# Patient Record
Sex: MALE | Race: WHITE | NOT HISPANIC OR LATINO | Employment: UNEMPLOYED | URBAN - METROPOLITAN AREA
[De-identification: names, ages, dates, MRNs, and addresses within clinical notes are randomized per-mention and may not be internally consistent; named-entity substitution may affect disease eponyms.]

---

## 2018-05-07 ENCOUNTER — OFFICE VISIT (OUTPATIENT)
Dept: FAMILY MEDICINE | Facility: CLINIC | Age: 58
End: 2018-05-07
Payer: COMMERCIAL

## 2018-05-07 VITALS
DIASTOLIC BLOOD PRESSURE: 74 MMHG | TEMPERATURE: 98.9 F | HEIGHT: 71 IN | RESPIRATION RATE: 20 BRPM | HEART RATE: 71 BPM | WEIGHT: 205.9 LBS | OXYGEN SATURATION: 97 % | SYSTOLIC BLOOD PRESSURE: 120 MMHG | BODY MASS INDEX: 28.82 KG/M2

## 2018-05-07 DIAGNOSIS — R29.898 RIGHT ARM WEAKNESS: Primary | ICD-10-CM

## 2018-05-07 DIAGNOSIS — R25.1 TREMOR OF RIGHT HAND: ICD-10-CM

## 2018-05-07 PROCEDURE — 99214 OFFICE O/P EST MOD 30 MIN: CPT | Performed by: PHYSICIAN ASSISTANT

## 2018-05-07 ASSESSMENT — PAIN SCALES - GENERAL: PAINLEVEL: NO PAIN (0)

## 2018-05-07 NOTE — PROGRESS NOTES
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     Right arm weakness, and swelling      Duration: 6 months, gradual loss of function and weakness. Patient reports that fine movement is altered, f.e hard to hold a pen and write. Hand is swollen    Description (location/character/radiation): right arm    Intensity:  moderate    Accompanying signs and symptoms: weak  and fine movement is affected    History (similar episodes/previous evaluation): patient reports that 10 years ago patient has neck MRI that showed spinal stenosis. No surgery was done since it was mild. No current neck pain.     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     Precipitating or alleviating factors: None    Therapies tried and outcome: None       Problem list and histories reviewed & adjusted, as indicated.  Additional history: as documented    There is no problem list on file for this patient.    History reviewed. No pertinent surgical history.    Social History   Substance Use Topics     Smoking status: Light Tobacco Smoker     Types: Cigars     Smokeless tobacco: Never Used     Alcohol use Yes     Family History   Problem Relation Age of Onset     Hypertension Father          No current outpatient prescriptions on file.     No Known Allergies           ROS:  Constitutional, HEENT, cardiovascular, pulmonary, GI, , musculoskeletal, neuro, skin, endocrine and psych systems are negative, except as otherwise noted.    OBJECTIVE:     /74 (BP Location: Right arm, Patient Position: Sitting, Cuff Size: Adult Large)  Pulse 71  Temp 98.9  F (37.2  C) (Oral)  Resp 20  Ht 1.815 m (5' 11.46\")  Wt 93.4 kg (205 lb 14.4 oz)  SpO2 97%  BMI 28.35 " kg/m2  Body mass index is 28.35 kg/(m^2).  GENERAL: healthy, alert and no distress  NECK: no adenopathy, no asymmetry, masses, or scars and thyroid normal to palpation  RESP: lungs clear to auscultation - no rales, rhonchi or wheezes  CV: regular rate and rhythm, normal S1 S2, no S3 or S4, no murmur, click or rub, no peripheral edema and peripheral pulses strong  ABDOMEN: soft, nontender, no hepatosplenomegaly, no masses and bowel sounds normal  MS: no gross musculoskeletal defects noted, no edema  NEURO: weakness of right arm C6 and C7,  is 4/5 compared to left , tremor right hand only, sensory exam grossly normal, mentation intact, oriented times 3, speech slurred and DTR's normal and symmetric +2 bilaterally, cranial nerved- right facial nerve weakness (asymmetrical smile)    Diagnostic Test Results:  none     ASSESSMENT/PLAN:       ICD-10-CM    1. Right arm weakness R29.898 NEUROLOGY ADULT REFERRAL     MR Cervical Spine w/o Contrast   2. Tremor of right hand R25.1 NEUROLOGY ADULT REFERRAL     Patient will make appointment with neurologist for a full evaluation.   MRI of cervical spine was ordered as well.       Earline Rivas PA-C  [facility]

## 2018-05-07 NOTE — PATIENT INSTRUCTIONS
Please call  Radiology at [phone] to schedule your appointment for MRI of neck     Make appointment with neurology

## 2018-05-07 NOTE — MR AVS SNAPSHOT
After Visit Summary   5/7/2018    Marcus Daley    MRN: [id number]           Patient Information     Date Of Birth          1960        Visit Information        Provider Department      5/7/2018 10:00 AM Earline Rivas PA-C [facility]        Today's Diagnoses     Right arm weakness    -  1      Care Instructions    Please call  Radiology at [phone] to schedule your appointment for MRI of neck     Make appointment with neurology            Follow-ups after your visit        Additional Services     NEUROLOGY ADULT REFERRAL       Your provider has referred you for the following:   Consult at [facility]: [facility] [phone]   [email or web address]    Please be aware that coverage of these services is subject to the terms and limitations of your health insurance plan.  Call member services at your health plan with any benefit or coverage questions.      Please bring the following with you to your appointment:    (1) Any X-Rays, CTs or MRIs which have been performed.  Contact the facility where they were done to arrange for  prior to your scheduled appointment.    (2) List of current medications  (3) This referral request   (4) Any documents/labs given to you for this referral                  Future tests that were ordered for you today     Open Future Orders        Priority Expected Expires Ordered    MR Cervical Spine w/o Contrast Routine  5/7/2019 5/7/2018            Who to contact     If you have questions or need follow up information about today's clinic visit or your schedule please contact [facility] directly at [phone].  Normal or non-critical lab and imaging results will be communicated to you by MyChart, letter or phone within 4 business days after the clinic has received the results. If you do not hear from us within 7 days, please  "contact the clinic through [employer] or phone. If you have a critical or abnormal lab result, we will notify you by phone as soon as possible.  Submit refill requests through [employer] or call your pharmacy and they will forward the refill request to us. Please allow 3 business days for your refill to be completed.          Additional Information About Your Visit        [employer] Information     [employer] gives you secure access to your electronic health record. If you see a primary care provider, you can also send messages to your care team and make appointments. If you have questions, please call your primary care clinic.  If you do not have a primary care provider, please call [phone] and they will assist you.        Care EveryWhere ID     This is your Care EveryWhere ID. This could be used by other organizations to access your [place] medical records  [id number]        Your Vitals Were     Pulse Temperature Respirations Height Pulse Oximetry BMI (Body Mass Index)    71 98.9  F (37.2  C) (Oral) 20 1.815 m (5' 11.46\") 97% 28.35 kg/m2       Blood Pressure from Last 3 Encounters:   05/07/18 120/74   11/19/14 120/74    Weight from Last 3 Encounters:   05/07/18 93.4 kg (205 lb 14.4 oz)   11/19/14 91.2 kg (201 lb)              We Performed the Following     NEUROLOGY ADULT REFERRAL        Primary Care Provider Office Phone # Fax #    Sharona Rossy Nicholas PA-C [phone] [address] [place] [facility] [address]        Equal Access to Services     BANDAR MCDUFFIE AH: Hadii aad ku hadasho Soomaali, waaxda luqadaha, qaybta kaalmada adeegyada, waxay ahmet chavarria. So [facility] [phone].    ATENCIÓN: Si habla español, tiene a hadley disposición servicios gratuitos de asistencia lingüística. Llame al [phone].    We comply with applicable federal civil rights laws and [place] laws. We do not discriminate on the basis of race, color, national origin, age, disability, sex, sexual orientation, " or gender identity.            Thank you!     Thank you for choosing [facility]  for your care. Our goal is always to provide you with excellent care. Hearing back from our patients is one way we can continue to improve our services. Please take a few minutes to complete the written survey that you may receive in the mail after your visit with us. Thank you!             Your Updated Medication List - Protect others around you: Learn how to safely use, store and throw away your medicines at www.disposemymeds.org.      Notice  As of 5/7/2018 10:53 AM    You have not been prescribed any medications.

## 2018-05-10 ENCOUNTER — OFFICE VISIT (OUTPATIENT)
Dept: NEUROLOGY | Facility: CLINIC | Age: 58
End: 2018-05-10
Payer: COMMERCIAL

## 2018-05-10 VITALS
HEART RATE: 87 BPM | SYSTOLIC BLOOD PRESSURE: 143 MMHG | BODY MASS INDEX: 28.56 KG/M2 | DIASTOLIC BLOOD PRESSURE: 87 MMHG | TEMPERATURE: 98.7 F | HEIGHT: 71 IN | WEIGHT: 204 LBS

## 2018-05-10 DIAGNOSIS — G25.9 EXTRAPYRAMIDAL DISORDER: ICD-10-CM

## 2018-05-10 DIAGNOSIS — R29.898 RIGHT HAND WEAKNESS: Primary | ICD-10-CM

## 2018-05-10 ASSESSMENT — PAIN SCALES - GENERAL: PAINLEVEL: NO PAIN (0)

## 2018-05-10 NOTE — LETTER
5/10/2018       RE: Marcus Daley  [address] [place] [address]     Dear Colleague,    Thank you for referring your patient, Marcus Daley, to the [facility] NEUROSPECIALTIES at [facility]. Please see a copy of my visit note below.    Service Date: 05/10/2018      REASON FOR VISIT:   Marcus Daley is a 57-year-old right-handed male referred by Earline Rivas for evaluation of right hand weakness and tremor.      HISTORY OF PRESENT ILLNESS:  He appreciated the problem starting about 6-8 months ago.  One of the first things he noted that he was having more difficulty strumming his guitar with his right hand.  As time has gone on, he has noted a sense of weakness and stiffness in the right upper extremity as well as some clumsiness with right hand activities.  He has noted a tremor of the right hand but not clearly a rest tremor.  He does not feel he has any difficulty with his gait that been apparent.  He has had some pain in his neck which is likely chronic, but no radicular pain in the right arm.  There is equivocal change in sensation in his right hand.  He has been on no medications.  He denies a change in his sense of smell.  He denies a history of REM sleep behavioral disturbance.      He does have a history of cervical spondylosis and had a cervical MRI scan done in 2010 that revealed some foraminal narrowing and disk protrusions towards the left at C3-C4 and C6-C7, but no spinal cord abnormalities.      Interestingly, back around 2010 he developed what was characterized as a spastic dysphonia but this resolved on its own.      PAST MEDICAL HISTORY:   Otherwise unremarkable.        CURRENT MEDICATIONS:   He is on no medications.        ALLERGIES:   He denies any medical allergies.      FAMILY HISTORY:  Negative for neurologic disorders as best he knows.  He does tell me his father as he got older started delegating more activities to his nondominant left  upper extremity for reasons that are not clear.      SOCIAL HISTORY:   He is a part-time .  He smokes cigars on occasion.  He averages about 6 beers a week.      PHYSICAL EXAMINATION:     GENERAL:  The patient is alert and cooperative.     VITAL SIGNS:   Heart rate 87.  Blood pressure 143/87.   NEUROLOGIC:   The patient does have a reduced blink rate.  He tends to hold the right hand in a posture and with the arm also flexed at the elbow.  He has a very fine postural tremor but no rest tremor.  He has increased tone in the right upper extremity with associated cogwheeling and also some mild cogwheeling in the left arm.  Finger tapping movements are very low amplitude on the right but normal on the left.  His gait is characterized by a lack of arm swing on the right. He maintains balance when pulled.     Cranial nerves II-XII are intact.  Motor examination reveals intact strength.  Sensory examination is intact.  Cerebellar function appears intact.  Reflexes are 2 in the upper extremities, 3+ at the knees and 1+ at the ankles.  Plantar responses are flexor.      IMPRESSION:  Probable Parkinson disease.      He does appear to have an extrapyramidal disorder predominantly affecting the right upper extremity.  I suspect this is Idiopathic Parkinson disease.      PLAN:  He has been scheduled to have  cervical MRI scan and I recommended also he have a brain MRI scan to make certain we are not dealing with a structural lesion in the left hemisphere that might account for his findings and symptoms.      I did discuss Parkinson disease with him.  I told him that I am concerned that is the diagnosis.  We touched upon a trial of carbidopa/levodopa and he will consider that.      I am going to be seeing him back in 2 weeks to review testing.      Mehul Barraza MD      cc:   Earline Rivas PA-C   [place] [address]              D:  05/10/2018   T: 05/10/2018   MT: MARIFER      Name:     KIA VILLAGRAN   MRN:      [id number]        Account:      [id number]   :      1960           Service Date: 05/10/2018      Document: [id number]

## 2018-05-10 NOTE — PROGRESS NOTES
Service Date: 05/10/2018      REASON FOR VISIT:   Marcus Daley is a 57-year-old right-handed male referred by Earline Rivas for evaluation of right hand weakness and tremor.      HISTORY OF PRESENT ILLNESS:  He appreciated the problem starting about 6-8 months ago.  One of the first things he noted that he was having more difficulty strumming his guitar with his right hand.  As time has gone on, he has noted a sense of weakness and stiffness in the right upper extremity as well as some clumsiness with right hand activities.  He has noted a tremor of the right hand but not clearly a rest tremor.  He does not feel he has any difficulty with his gait that been apparent.  He has had some pain in his neck which is likely chronic, but no radicular pain in the right arm.  There is equivocal change in sensation in his right hand.  He has been on no medications.  He denies a change in his sense of smell.  He denies a history of REM sleep behavioral disturbance.      He does have a history of cervical spondylosis and had a cervical MRI scan done in 2010 that revealed some foraminal narrowing and disk protrusions towards the left at C3-C4 and C6-C7, but no spinal cord abnormalities.      Interestingly, back around 2010 he developed what was characterized as a spastic dysphonia but this resolved on its own.      PAST MEDICAL HISTORY:   Otherwise unremarkable.        CURRENT MEDICATIONS:   He is on no medications.        ALLERGIES:   He denies any medical allergies.      FAMILY HISTORY:  Negative for neurologic disorders as best he knows.  He does tell me his father as he got older started delegating more activities to his nondominant left upper extremity for reasons that are not clear.      SOCIAL HISTORY:   He is a part-time .  He smokes cigars on occasion.  He averages about 6 beers a week.      PHYSICAL EXAMINATION:     GENERAL:  The patient is alert and cooperative.     VITAL SIGNS:   Heart rate  87.  Blood pressure 143/87.   NEUROLOGIC:   The patient does have a reduced blink rate.  He tends to hold the right hand in a posture and with the arm also flexed at the elbow.  He has a very fine postural tremor but no rest tremor.  He has increased tone in the right upper extremity with associated cogwheeling and also some mild cogwheeling in the left arm.  Finger tapping movements are very low amplitude on the right but normal on the left.  His gait is characterized by a lack of arm swing on the right. He maintains balance when pulled.     Cranial nerves II-XII are intact.  Motor examination reveals intact strength.  Sensory examination is intact.  Cerebellar function appears intact.  Reflexes are 2 in the upper extremities, 3+ at the knees and 1+ at the ankles.  Plantar responses are flexor.      IMPRESSION:  Probable Parkinson disease.      He does appear to have an extrapyramidal disorder predominantly affecting the right upper extremity.  I suspect this is Idiopathic Parkinson disease.      PLAN:  He has been scheduled to have  cervical MRI scan and I recommended also he have a brain MRI scan to make certain we are not dealing with a structural lesion in the left hemisphere that might account for his findings and symptoms.      I did discuss Parkinson disease with him.  I told him that I am concerned that is the diagnosis.  We touched upon a trial of carbidopa/levodopa and he will consider that.      I am going to be seeing him back in 2 weeks to review testing.      Mehul Barraza MD      cc:   Earline Rivas PA-C   [place] [address]         MEHUL BARRAZA MD             D: 05/10/2018   T: 05/10/2018   MT: MARIFER      Name:     KIA VILLAGRAN   MRN:      [id number]        Account:      [id number]   :      1960           Service Date: 05/10/2018      Document: [id number]

## 2018-05-10 NOTE — MR AVS SNAPSHOT
After Visit Summary   5/10/2018    Marcus Daley    MRN: [id number]           Patient Information     Date Of Birth          1960        Visit Information        Provider Department      5/10/2018 9:15 AM Mehul Barraza MD [facility] NEUROSPECIALTIES        Today's Diagnoses     Right hand weakness    -  1    Extrapyramidal disorder           Follow-ups after your visit        Follow-up notes from your care team     Inform patient at next visit Return in about 2 weeks (around 5/24/2018).      Your next 10 appointments already scheduled     May 24, 2018  9:45 AM CDT   Return Visit with Mehul Barraza MD   [facility] NEUROSPECIALTIES ([facility])    [address] [address] [phone]              Future tests that were ordered for you today     Open Future Orders        Priority Expected Expires Ordered    MR Brain w/o & w Contrast Routine 5/10/2018 5/10/2019 5/10/2018            Who to contact     Please call your clinic at [phone] to:    Ask questions about your health    Make or cancel appointments    Discuss your medicines    Learn about your test results    Speak to your doctor            Additional Information About Your Visit        [employer] Information     [employer] gives you secure access to your electronic health record. If you see a primary care provider, you can also send messages to your care team and make appointments. If you have questions, please call your primary care clinic.  If you do not have a primary care provider, please call [phone] and they will assist you.      [employer] is an electronic gateway that provides easy, online access to your medical records. With [employer], you can request a clinic appointment, read your test results, renew a prescription or communicate with your care team.     To access your existing account, please contact your [facility] or call [phone] for assistance.       "  Care EveryWhere ID     This is your Care EveryWhere ID. This could be used by other organizations to access your [place] medical records  [id number]        Your Vitals Were     Pulse Temperature Height BMI (Body Mass Index)          87 98.7  F (37.1  C) (Temporal) 5' 11.46\" (181.5 cm) 28.09 kg/m2         Blood Pressure from Last 3 Encounters:   05/10/18 143/87   05/07/18 120/74   11/19/14 120/74    Weight from Last 3 Encounters:   05/10/18 204 lb (92.5 kg)   05/07/18 205 lb 14.4 oz (93.4 kg)   11/19/14 201 lb (91.2 kg)               Primary Care Provider Office Phone # Fax #    Sharona Rossy Nicholas PA-C [phone] [address] [place] [address]        Equal Access to Services     [facility]: Hadii aad ku hadasho Sokailashali, waaxda luqadaha, qaybta kaalmada adeegyada, waxay idiin hayaan deysi ocampo . So [facility] [phone].    ATENCIÓN: Si habla español, tiene a hadley disposición servicios gratuitos de asistencia lingüística. Llame al [phone].    We comply with applicable federal civil rights laws and [place] laws. We do not discriminate on the basis of race, color, national origin, age, disability, sex, sexual orientation, or gender identity.            Thank you!     Thank you for choosing [facility] NEUROSPECIALTIES  for your care. Our goal is always to provide you with excellent care. Hearing back from our patients is one way we can continue to improve our services. Please take a few minutes to complete the written survey that you may receive in the mail after your visit with us. Thank you!             Your Updated Medication List - Protect others around you: Learn how to safely use, store and throw away your medicines at www.disposemymeds.org.      Notice  As of 5/10/2018 10:03 AM    You have not been prescribed any medications.      "

## 2018-05-17 ENCOUNTER — RADIANT APPOINTMENT (OUTPATIENT)
Dept: MRI IMAGING | Facility: CLINIC | Age: 58
End: 2018-05-17
Attending: PSYCHIATRY & NEUROLOGY
Payer: COMMERCIAL

## 2018-05-17 ENCOUNTER — RADIANT APPOINTMENT (OUTPATIENT)
Dept: MRI IMAGING | Facility: CLINIC | Age: 58
End: 2018-05-17
Attending: PHYSICIAN ASSISTANT
Payer: COMMERCIAL

## 2018-05-17 DIAGNOSIS — R29.898 RIGHT HAND WEAKNESS: ICD-10-CM

## 2018-05-17 DIAGNOSIS — R29.898 RIGHT ARM WEAKNESS: Primary | ICD-10-CM

## 2018-05-17 DIAGNOSIS — G25.9 EXTRAPYRAMIDAL DISORDER: ICD-10-CM

## 2018-05-17 DIAGNOSIS — M48.02 FORAMINAL STENOSIS OF CERVICAL REGION: ICD-10-CM

## 2018-05-17 PROCEDURE — 70553 MRI BRAIN STEM W/O & W/DYE: CPT | Performed by: RADIOLOGY

## 2018-05-17 PROCEDURE — A9585 GADOBUTROL INJECTION: HCPCS | Mod: JW | Performed by: PSYCHIATRY & NEUROLOGY

## 2018-05-17 PROCEDURE — 72141 MRI NECK SPINE W/O DYE: CPT | Performed by: RADIOLOGY

## 2018-05-17 RX ORDER — GADOBUTROL 604.72 MG/ML
10 INJECTION INTRAVENOUS ONCE
Status: COMPLETED | OUTPATIENT
Start: 2018-05-17 | End: 2018-05-17

## 2018-05-17 RX ADMIN — GADOBUTROL 9.5 ML: 604.72 INJECTION INTRAVENOUS at 12:32

## 2018-05-18 ENCOUNTER — TELEPHONE (OUTPATIENT)
Dept: OTHER | Facility: CLINIC | Age: 58
End: 2018-05-18

## 2018-05-24 ENCOUNTER — OFFICE VISIT (OUTPATIENT)
Dept: NEUROLOGY | Facility: CLINIC | Age: 58
End: 2018-05-24
Payer: COMMERCIAL

## 2018-05-24 VITALS
BODY MASS INDEX: 28.53 KG/M2 | WEIGHT: 203.8 LBS | TEMPERATURE: 98.7 F | DIASTOLIC BLOOD PRESSURE: 91 MMHG | HEART RATE: 103 BPM | HEIGHT: 71 IN | SYSTOLIC BLOOD PRESSURE: 155 MMHG

## 2018-05-24 DIAGNOSIS — G20.A1 PARALYSIS AGITANS (H): Primary | ICD-10-CM

## 2018-05-24 RX ORDER — CARBIDOPA AND LEVODOPA 25; 100 MG/1; MG/1
TABLET ORAL
Qty: 90 TABLET | Refills: 3 | Status: SHIPPED | OUTPATIENT
Start: 2018-05-24 | End: 2018-09-07

## 2018-05-24 RX ORDER — METOPROLOL SUCCINATE 100 MG/1
TABLET, EXTENDED RELEASE ORAL
COMMUNITY
Start: 2018-01-24 | End: 2019-10-11

## 2018-05-24 ASSESSMENT — PAIN SCALES - GENERAL: PAINLEVEL: NO PAIN (0)

## 2018-05-24 NOTE — LETTER
5/24/2018       RE: Marcus Daley  [address] [place] [address]     Dear Colleague,    Thank you for referring your patient, Marcus Daley, to the [facility] NEUROSPECIALTIES at [facility]. Please see a copy of my visit note below.    Service Date: 05/24/2018      INTERVAL HISTORY:   Marcus Daley returns for followup.  Today he is accompanied by his wife.  He is a patient I saw earlier this month for evaluation of right upper extremity rigidity and tremor.  I felt he likely had Parkinson disease.      He did have prior to seeing me a cervical MRI scan ordered and I did review the images with the patient and his wife.  His spinal cord looks normal.  He does have some degenerative changes.  There is some moderate foraminal narrowing at C4-C5 on the right and also some foraminal narrowing on the left at C6-C7 and C3-C4.  I do not think these findings are sufficient to account for his findings, although it might be relevant to some of the paresthesias he is experiencing.      A brain MRI scan was done and it was reviewed as well.  The study is normal.      I discussed Parkinson disease.  I do believe that is the diagnosis and likely is idiopathic Parkinson disease.  His wife has questions about doing a DaTscan.  I did tell them that in my opinion, I did not think a DaTscan was necessary.  I did talk about getting a second opinion and his wife encouraged him to pursue this.      We did discuss symptomatic therapy and in particular carbidopa/levodopa.  I explained how the drug works.  We reviewed potential side effects.      He would like to try the carbidopa/levodopa.  He is going to start on half of a 25/100 tablet twice a day for a week, then he can go to a half a tablet 3 times a day for a week and then a full tablet 3 times a day.      I did place a referral for him to see Dr. Rodriguez at the [facility] for a second opinion.      I am not certain when he  will get in to see Dr. Rodriguez so I also scheduled a followup appointment with me in 1 month.  He can cancel that if he gets in to see Dr. Rodriguez sooner.      He is going to hold on starting the carbidopa/levodopa until after .  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        Mehul Barraza MD      cc:   Abelardo Rodriguez MD   [facility]    [address] [place] [address]           D: 2018   T: 2018   MT: MARIFER      Name:     KIA VILLAGRAN   MRN:      [id number]        Account:      [id number]   :      1960           Service Date: 2018      Document: [id number]

## 2018-05-24 NOTE — MR AVS SNAPSHOT
After Visit Summary   5/24/2018    Marcus Daley    MRN: [id number]           Patient Information     Date Of Birth          1960        Visit Information        Provider Department      5/24/2018 9:45 AM Mehul Barraza MD [facility] NEUROSPECIALTIES        Today's Diagnoses     Paralysis agitans (H)    -  1       Follow-ups after your visit        Additional Services     NEUROLOGY ADULT REFERRAL       Your provider has referred you for the following:   Consult at [facility]: [facility] [phone]   [email or web address]/  Dr Rodriguez    Please be aware that coverage of these services is subject to the terms and limitations of your health insurance plan.  Call member services at your health plan with any benefit or coverage questions.      Please bring the following with you to your appointment:    (1) Any X-Rays, CTs or MRIs which have been performed.  Contact the facility where they were done to arrange for  prior to your scheduled appointment.    (2) List of current medications  (3) This referral request   (4) Any documents/labs given to you for this referral                  Follow-up notes from your care team     Discussed this visit Return in about 1 month (around 6/24/2018).      Your next 10 appointments already scheduled     Jun 28, 2018  9:45 AM CDT   Return Visit with Mehul Barraza MD   [facility] NEUROSPECIALTIES ([facility])    [address] [id number] [phone]              Who to contact     Please call your clinic at [phone] to:    Ask questions about your health    Make or cancel appointments    Discuss your medicines    Learn about your test results    Speak to your doctor            Additional Information About Your Visit        MyChart Information     [employer] / [employer] gives you secure access to your electronic health record. If you see a primary care  "provider, you can also send messages to your care team and make appointments. If you have questions, please call your primary care clinic.  If you do not have a primary care provider, please call [phone] and they will assist you.      [employer] is an electronic gateway that provides easy, online access to your medical records. With [employer], you can request a clinic appointment, read your test results, renew a prescription or communicate with your care team.     To access your existing account, please contact your [facility] or call [phone] for assistance.        Care EveryWhere ID     This is your Care EveryWhere ID. This could be used by other organizations to access your [place] medical records  [id number]        Your Vitals Were     Pulse Temperature Height BMI (Body Mass Index)          103 98.7  F (37.1  C) (Temporal) 5' 11.46\" (181.5 cm) 28.06 kg/m2         Blood Pressure from Last 3 Encounters:   05/24/18 (!) 155/91   05/10/18 143/87   05/07/18 120/74    Weight from Last 3 Encounters:   05/24/18 203 lb 12.8 oz (92.4 kg)   05/10/18 204 lb (92.5 kg)   05/07/18 205 lb 14.4 oz (93.4 kg)              We Performed the Following     NEUROLOGY ADULT REFERRAL          Today's Medication Changes          These changes are accurate as of 5/24/18 10:34 AM.  If you have any questions, ask your nurse or doctor.               Start taking these medicines.        Dose/Directions    carbidopa-levodopa  MG per tablet   Commonly known as:  SINEMET   Used for:  Paralysis agitans (H)   Started by:  Mehul Barraza MD        1/2 tablet twice a day x 1 week, then 1/2 tablet 3 times a day x 1 week, then 1 tablet 3 times a day   Quantity:  90 tablet   Refills:  3            Where to get your medicines      These medications were sent to [facility]  JAMARI DAVIS - [address] [place] AT [facility] [address] [place] [address]     Phone:  " [phone]     carbidopa-levodopa  MG per tablet                Primary Care Provider Office Phone # Fax #    Sharona Nicholas PA-C [phone] [address] [place] [address]        Equal Access to Services     : Hadii aad ku hadasho Soomaali, waaxda luqadaha, qaybta kaalmada adeegyada, waxay idiin hayaan adegarrett eugeniamohamud lanancy chavarria. So [facility] [phone].    ATENCIÓN: Si habla español, tiene a hadley disposición servicios gratuitos de asistencia lingüística. Tayoame al [phone].    We comply with applicable federal civil rights laws and [place] laws. We do not discriminate on the basis of race, color, national origin, age, disability, sex, sexual orientation, or gender identity.            Thank you!     Thank you for choosing [facility] NEUROSPECIALTIES  for your care. Our goal is always to provide you with excellent care. Hearing back from our patients is one way we can continue to improve our services. Please take a few minutes to complete the written survey that you may receive in the mail after your visit with us. Thank you!             Your Updated Medication List - Protect others around you: Learn how to safely use, store and throw away your medicines at www.disposemymeds.org.          This list is accurate as of 5/24/18 10:34 AM.  Always use your most recent med list.                   Brand Name Dispense Instructions for use Diagnosis    carbidopa-levodopa  MG per tablet    SINEMET    90 tablet    1/2 tablet twice a day x 1 week, then 1/2 tablet 3 times a day x 1 week, then 1 tablet 3 times a day    Paralysis agitans (H)       metoprolol succinate 100 MG 24 hr tablet    TOPROL-XL     Pt takes 50 mg once a week

## 2018-05-24 NOTE — LETTER
5/24/2018       RE: Marcus Daley  [address] [place] [address]     Dear Colleague,    Thank you for referring your patient, Marcus Daley, to the [facility] NEUROSPECIALTIES at [facility]. Please see a copy of my visit note below.    Service Date: 05/24/2018      INTERVAL HISTORY:   Marcus Daley returns for followup.  Today he is accompanied by his wife.  He is a patient I saw earlier this month for evaluation of right upper extremity rigidity and tremor.  I felt he likely had Parkinson disease.      He did have prior to seeing me a cervical MRI scan ordered and I did review the images with the patient and his wife.  His spinal cord looks normal.  He does have some degenerative changes.  There is some moderate foraminal narrowing at C4-C5 on the right and also some foraminal narrowing on the left at C6-C7 and C3-C4.  I do not think these findings are sufficient to account for his findings, although it might be relevant to some of the paresthesias he is experiencing.      A brain MRI scan was done and it was reviewed as well.  The study is normal.      I discussed Parkinson disease.  I do believe that is the diagnosis and likely is idiopathic Parkinson disease.  His wife has questions about doing a DaTscan.  I did tell them that in my opinion, I did not think a DaTscan was necessary.  I did talk about getting a second opinion and his wife encouraged him to pursue this.      We did discuss symptomatic therapy and in particular carbidopa/levodopa.  I explained how the drug works.  We reviewed potential side effects.      He would like to try the carbidopa/levodopa.  He is going to start on half of a 25/100 tablet twice a day for a week, then he can go to a half a tablet 3 times a day for a week and then a full tablet 3 times a day.      I did place a referral for him to see Dr. Rodriguez at the [facility] for a second opinion.      I am not certain when he  will get in to see Dr. Rodriguez so I also scheduled a followup appointment with me in 1 month.  He can cancel that if he gets in to see Dr. Rodriguez sooner.      He is going to hold on starting the carbidopa/levodopa until after .  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        Mehul Barraza MD      cc:   Abelardo Rodriguez MD   [facility]    [address] [place] [address]          D: 2018   T: 2018   MT: MARIFER      Name:     KIA VILLAGRAN   MRN:      [id number]        Account:      [id number]   :      1960           Service Date: 2018      Document: [id number]

## 2018-05-24 NOTE — PROGRESS NOTES
Service Date: 05/24/2018      INTERVAL HISTORY:   Marcus Daley returns for followup.  Today he is accompanied by his wife.  He is a patient I saw earlier this month for evaluation of right upper extremity rigidity and tremor.  I felt he likely had Parkinson disease.      He did have prior to seeing me a cervical MRI scan ordered and I did review the images with the patient and his wife.  His spinal cord looks normal.  He does have some degenerative changes.  There is some moderate foraminal narrowing at C4-C5 on the right and also some foraminal narrowing on the left at C6-C7 and C3-C4.  I do not think these findings are sufficient to account for his findings, although it might be relevant to some of the paresthesias he is experiencing.      A brain MRI scan was done and it was reviewed as well.  The study is normal.      I discussed Parkinson disease.  I do believe that is the diagnosis and likely is idiopathic Parkinson disease.  His wife has questions about doing a DaTscan.  I did tell them that in my opinion, I did not think a DaTscan was necessary.  I did talk about getting a second opinion and his wife encouraged him to pursue this.      We did discuss symptomatic therapy and in particular carbidopa/levodopa.  I explained how the drug works.  We reviewed potential side effects.      He would like to try the carbidopa/levodopa.  He is going to start on half of a 25/100 tablet twice a day for a week, then he can go to a half a tablet 3 times a day for a week and then a full tablet 3 times a day.      I did place a referral for him to see Dr. Rodriguez at the [facility] for a second opinion.      I am not certain when he will get in to see Dr. Rodriguez so I also scheduled a followup appointment with me in 1 month.  He can cancel that if he gets in to see Dr. Rodriguez sooner.      He is going to hold on starting the carbidopa/levodopa until after 06/07.  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        Mehul Barraza MD      cc:   Abelardo Rodriguez MD   [facility]    [address] [place] [address]         MEHUL BARRAZA MD             D: 2018   T: 2018   MT: MARIFER      Name:     KIA VILLAGRAN   MRN:      [id number]        Account:      [id number]   :      1960           Service Date: 2018      Document: [id number]

## 2018-05-24 NOTE — LETTER
5/24/2018       RE: Marcus Daley  [address] [place] [address]     Dear Colleague,    Thank you for referring your patient, Marcus Daley, to the [facility] NEUROSPECIALTIES at [facility]. Please see a copy of my visit note below.    Service Date: 05/24/2018      INTERVAL HISTORY:   Marcus Daley returns for followup.  Today he is accompanied by his wife.  He is a patient I saw earlier this month for evaluation of right upper extremity rigidity and tremor.  I felt he likely had Parkinson disease.      He did have prior to seeing me a cervical MRI scan ordered and I did review the images with the patient and his wife.  His spinal cord looks normal.  He does have some degenerative changes.  There is some moderate foraminal narrowing at C4-C5 on the right and also some foraminal narrowing on the left at C6-C7 and C3-C4.  I do not think these findings are sufficient to account for his findings, although it might be relevant to some of the paresthesias he is experiencing.      A brain MRI scan was done and it was reviewed as well.  The study is normal.      I discussed Parkinson disease.  I do believe that is the diagnosis and likely is idiopathic Parkinson disease.  His wife has questions about doing a DaTscan.  I did tell them that in my opinion, I did not think a DaTscan was necessary.  I did talk about getting a second opinion and his wife encouraged him to pursue this.      We did discuss symptomatic therapy and in particular carbidopa/levodopa.  I explained how the drug works.  We reviewed potential side effects.      He would like to try the carbidopa/levodopa.  He is going to start on half of a 25/100 tablet twice a day for a week, then he can go to a half a tablet 3 times a day for a week and then a full tablet 3 times a day.      I did place a referral for him to see Dr. Rodriguez at the [facility] for a second opinion.      I am not certain when he  will get in to see Dr. Rodriguez so I also scheduled a followup appointment with me in 1 month.  He can cancel that if he gets in to see Dr. Rodriguez sooner.      He is going to hold on starting the carbidopa/levodopa until after .  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        Mehul Barraza MD      cc:   Abelardo Rodriguez MD   [facility]    [address] [place] [address]              D: 2018   T: 2018   MT: MARIFER      Name:     KIA VILLAGRAN   MRN:      [id number]        Account:      [id number]   :      1960           Service Date: 2018      Document: [id number]

## 2018-06-11 ENCOUNTER — TELEPHONE (OUTPATIENT)
Dept: NEUROLOGY | Facility: CLINIC | Age: 58
End: 2018-06-11

## 2018-06-11 NOTE — TELEPHONE ENCOUNTER
[facility] Call Center    Phone Message    May a detailed message be left on voicemail: yes    Reason for Call: Other: Pt has a few questions before he starts carbidopa-levodopa (SINEMET)  MG per tablet. Please give him a call back.     Action Taken: Message routed to:  [facility] ([facility] Neurology

## 2018-06-11 NOTE — TELEPHONE ENCOUNTER
Returned a call to Marcus.  Had to leave a vm asking him to call me back to discuss his questions.

## 2018-06-27 ENCOUNTER — PRE VISIT (OUTPATIENT)
Dept: NEUROLOGY | Facility: CLINIC | Age: 58
End: 2018-06-27

## 2018-06-27 NOTE — TELEPHONE ENCOUNTER
PREVISIT INFORMATION                                                    Marcus BAZZI Rinayudelka scheduled for future visit at [facility] specialty clinics.    Patient is scheduled to see Dr. Colby on 6/29  Reason for visit: probably PD dx, hasn't started medications yet because he wants to talk to Dr. Rodriguez 1st  Referring provider Hyser  Has patient seen previous specialist? Madi  Medical Records:  Available in chart.  Patient was previously seen at a [facility] or [facility] facility. MRI in [facility]     REVIEW                                                      New patient packet mailed to patient: Yes  Medication reconciliation complete: Yes, recent office visit      Current Outpatient Prescriptions   Medication Sig Dispense Refill     carbidopa-levodopa (SINEMET)  MG per tablet 1/2 tablet twice a day x 1 week, then 1/2 tablet 3 times a day x 1 week, then 1 tablet 3 times a day 90 tablet 3     metoprolol succinate (TOPROL-XL) 100 MG 24 hr tablet Pt takes 50 mg once a week         Allergies: Review of patient's allergies indicates no known allergies.        PLAN/FOLLOW-UP NEEDED                                                            Patient Reminders Given:  Please, make sure you bring an updated list of your medications.   If you are having a procedure, please, present 15 minutes early.  If you need to cancel or reschedule,please call [phone].    Silvana Newton

## 2018-06-29 ENCOUNTER — OFFICE VISIT (OUTPATIENT)
Dept: NEUROLOGY | Facility: CLINIC | Age: 58
End: 2018-06-29
Attending: PSYCHIATRY & NEUROLOGY
Payer: COMMERCIAL

## 2018-06-29 ENCOUNTER — TELEPHONE (OUTPATIENT)
Dept: NEUROLOGY | Facility: CLINIC | Age: 58
End: 2018-06-29

## 2018-06-29 VITALS
SYSTOLIC BLOOD PRESSURE: 147 MMHG | OXYGEN SATURATION: 97 % | HEIGHT: 72 IN | WEIGHT: 204.2 LBS | HEART RATE: 91 BPM | BODY MASS INDEX: 27.66 KG/M2 | DIASTOLIC BLOOD PRESSURE: 87 MMHG

## 2018-06-29 DIAGNOSIS — G20.C PARKINSONISM, UNSPECIFIED PARKINSONISM TYPE (H): Primary | ICD-10-CM

## 2018-06-29 PROCEDURE — 99204 OFFICE O/P NEW MOD 45 MIN: CPT | Performed by: PSYCHIATRY & NEUROLOGY

## 2018-06-29 ASSESSMENT — UNIFIED PARKINSONS DISEASE RATING SCALE (UPDRS)
SPEECH: NORMAL
RIGIDITY_RUE: MODERATE: RIGIDITY DETECTED WITHOUT THE ACTIVATION MANEUVER. FULL RANGE OF MOTION IS ACHIEVED WITH EFFORT.
HANDMOVEMENTS_RIGHT: MILD: ANY OF THE FOLLOWING: A) 3 TO 5 INTERRUPTIONS DURING TAPPING B) MILD SLOWING C) THE AMPLITUDE DECREMENTS MIDWAY IN THE 10-MOVEMENT SEQUENCE
GAIT: NORMAL
AMPLITUDE_LUE: NORMAL: NO TREMOR.
HANDMOVEMENTS_LEFT: NORMAL
TOTAL_SCORE: 12
LEG_AGILITY_LEFT: NORMAL
FACIAL_EXPRESSION: NORMAL.
ARISING_CHAIR: NORMAL: ABLE TO ARISE QUICKLY WITHOUT HESITATION.
TOETAPPING_RIGHT: NORMAL
AMPLITUDE_RUE: SLIGHT: < 1 CM IN MAXIMAL AMPLITUDE.
POSTURE: 0 NORMAL, NO PROBLEMS
RIGIDITY_NECK: NORMAL
PRONATION_SUPINATION_LEFT: NORMAL
AMPLITUDE_RLE: NORMAL: NO TREMOR.
SPONTANEITY_OF_MOVEMENT: 0: NORMAL.  NO PROBLEMS.
RIGIDITY_LLE: NORMAL
TOETAPPING_LEFT: NORMAL
RIGIDITY_RLE: NORMAL
LEG_AGILITY_RIGHT: NORMAL
AXIAL_SCORE: 0
FINGER_TAPPING_RIGHT: MILD: ANY OF THE FOLLOWING: A) 3 TO 5 INTERRUPTIONS DURING TAPPING B) MILD SLOWING C) THE AMPLITUDE DECREMENTS MIDWAY IN THE 10-MOVEMENT SEQUENCE
FINGER_TAPPING_LEFT: NORMAL
FREEZING_GAIT: NORMAL
RIGIDITY_LUE: NORMAL
AMPLITUDE_LIP_JAW: NORMAL: NO TREMOR.
AMPLITUDE_LLE: NORMAL: NO TREMOR.
PRONATION_SUPINATION_RIGHT: MODERATE: ANY OF THE FOLLOWING:  A) MORE THAN 5 INTERRUPTIONS  OR AT LEAST ONE LONGER ARREST (FREEZE) IN ONGOING MOVEMENT  B) MODERATE SLOWING C) THE AMPLITUDE DECREMENTS STARTING AFTER THE FIRST MOVEMENT.
TOTAL_SCORE_LEFT: 0
POSTURAL_STABILITY: NORMAL:  RECOVERS WITH ONE OR TWO STEPS.

## 2018-06-29 ASSESSMENT — PAIN SCALES - GENERAL: PAINLEVEL: NO PAIN (0)

## 2018-06-29 NOTE — PATIENT INSTRUCTIONS
Recommendations:  1.  I recommend that he go ahead and start the trial of carbidopa levodopa.  I instructed him always to take this a half to one hour before meals.  He will escalate it slowly as indicated by Dr. Blake.  He would escalate towards a dose of 1-1/2 tablets 3 times a day as needed.  We should see him back in 2 months after starting the carbidopa levodopa to see his response.  2.  We talked about the value of daily exercise.  3.  He asked about driving and I think if he had a response to carbidopa/levodopa we would have no trouble writing a certificate for safe driving for him.  4.  See back in 2 months.

## 2018-06-29 NOTE — MR AVS SNAPSHOT
After Visit Summary   6/29/2018    Marcus Daley    MRN: [id number]           Patient Information     Date Of Birth          1960        Visit Information        Provider Department      6/29/2018 3:00 PM Abelardo Rodriguez MD [facility]         Follow-ups after your visit        Your next 10 appointments already scheduled     Aug 17, 2018 10:00 AM CDT   Return Visit with Abelardo Rodriguez MD   [facility] ([facility])    [address] [place] [address] [phone]              Who to contact     If you have questions or need follow up information about today's clinic visit or your schedule please contact [facility] directly at [phone].  Normal or non-critical lab and imaging results will be communicated to you by MyChart, letter or phone within 4 business days after the clinic has received the results. If you do not hear from us within 7 days, please contact the clinic through MyChart or phone. If you have a critical or abnormal lab result, we will notify you by phone as soon as possible.  Submit refill requests through [employer] or call your pharmacy and they will forward the refill request to us. Please allow 3 business days for your refill to be completed.          Additional Information About Your Visit        [employer] Information     [employer] gives you secure access to your electronic health record. If you see a primary care provider, you can also send messages to your care team and make appointments. If you have questions, please call your primary care clinic.  If you do not have a primary care provider, please call [phone] and they will assist you.      [employer] is an electronic gateway that provides easy, online access to your medical records. With [employer], you can request a clinic appointment, read your test results, renew a prescription or communicate with your care team.     To access your  existing account, please contact your [facility] or call [phone] for assistance.        Care EveryWhere ID     This is your Care EveryWhere ID. This could be used by other organizations to access your [facility] medical records  [id number]        Your Vitals Were     Pulse Height Pulse Oximetry BMI (Body Mass Index)          91 1.829 m (6') 97% 27.69 kg/m2         Blood Pressure from Last 3 Encounters:   06/29/18 147/87   05/24/18 (!) 155/91   05/10/18 143/87    Weight from Last 3 Encounters:   06/29/18 92.6 kg (204 lb 3.2 oz)   05/24/18 92.4 kg (203 lb 12.8 oz)   05/10/18 92.5 kg (204 lb)              Today, you had the following     No orders found for display       Primary Care Provider Office Phone # Fax #    Sharona Rossy Nicholas PA-C [phone] [address] [place] [facility] [address]        Equal Access to Services     VIGNESH [facility]OL[facility] : Hadii aad ku hadasho Soomaali, waaxda luqadaha, qaybta kaalmada adeegyada, waxay idiin haydakotah jo khjudy ocampo . So [facility] [phone].    ATENCIÓN: Si habla español, tiene a hadley disposición servicios gratuitos de asistencia lingüística. Llame al [phone].    We comply with applicable federal civil rights laws and [place] laws. We do not discriminate on the basis of race, color, national origin, age, disability, sex, sexual orientation, or gender identity.            Thank you!     Thank you for choosing [facility]  for your care. Our goal is always to provide you with excellent care. Hearing back from our patients is one way we can continue to improve our services. Please take a few minutes to complete the written survey that you may receive in the mail after your visit with us. Thank you!             Your Updated Medication List - Protect others around you: Learn how to safely use, store and throw away your medicines at www.disposemymeds.org.          This list is accurate as of 6/29/18  4:09 PM.   Always use your most recent med list.                   Brand Name Dispense Instructions for use Diagnosis    carbidopa-levodopa  MG per tablet    SINEMET    90 tablet    1/2 tablet twice a day x 1 week, then 1/2 tablet 3 times a day x 1 week, then 1 tablet 3 times a day    Paralysis agitans (H)       metoprolol succinate 100 MG 24 hr tablet    TOPROL-XL     Pt takes 50 mg once a week

## 2018-06-29 NOTE — PROGRESS NOTES
Department of Neurology  Movement Disorders Division   Initial Clinic Evaluation     Patient: Marcus Daley   MRN: [id number]   : 1960   Date of Visit: 2018     Referring Physician: Madi    Reason for Referral: Parkinsonism    Impression:  1.   Parkinsonism.  This looks most like idiopathic Parkinson's disease with right hemiparetic symptoms.  However the degree of rigidity in the right arm is significant and asymmetric.  Cortical basal syndrome would be a consideration.  However the patient has no apraxia or sensory loss in the right hand.  There is no evidence of mirror movements are alien limb.  I think this is idiopathic Parkinson's disease.    Recommendations:  1.  I recommend that he go ahead and start the trial of carbidopa levodopa.  I instructed him always to take this a half to one hour before meals.  He will escalate it slowly as indicated by Dr. Blake.  He would escalate towards a dose of 1-1/2 tablets 3 times a day as needed.  We should see him back in 2 months after starting the carbidopa levodopa to see his response.  2.  We talked about the value of daily exercise.  3.  He asked about driving and I think if he had a response to carbidopa/levodopa we would have no trouble writing a certificate for safe driving for him.  4.  See back in 2 months.      Please call or write with questions or concerns,    Sincerely yours,    Abelardo Rodriguez MD      History of Present Illness  Mr. Daley is a 58 year old male who is right-handed.    He comes with his wife and his daughter Neva.    There is no family history of Parkinson's disease.  He has no history of severe head injury.  There is no history of exposure to dopamine blocking drugs.  He is raised on city water.  He was a coffee drinker and smoked occasional cigars.    He has longtime as anosmia.  He does move in his sleep but he does not talk.    In  he had a voice that was raspy and made it hard to talk.  This lasted for 2  years and then went away.  He never had botulinum toxin injections although this was considered.    About a year ago he noticed that his right arm was not swinging freely when he would walk.  He noticed it but nobody else noticed it.  6 months ago he began to notice that his handwriting was changed.  He had to force the hand to write although it was never really small handwriting.  More recently he noticed that guitar strumming was not natural.  His right hand seems swollen.  Lately he has had some tremor of his right hand that seems like a resting tremor.  He says he is a slower toweling is here with his right hand.  Toothbrushing is slower.  He is beginning to use his left hand more.  He has no trouble turning in bed or standing from a chair.  He has no trouble dressing or buttoning.  He likes to do electronics as a hobby and he does not know any difficulty doing this.  Balance seems okay.  There is some shuffling.  He does not fall.  His memory is good.  He has had no hallucination or fainting.    The patient saw Dr. Blake in May.  Dr. Blake suspected parkinsonism most likely Parkinson's disease.  Carbidopa levodopa was prescribed but the patient did not start this.  The patient had an MRI of the brain which I have reviewed with him.  This looks entirely normal.  I do not see significant midbrain atrophy although the midbrain is not large.  There is no pontine signal change or basal ganglia signal change.      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    Medications:  Current Outpatient Prescriptions   Medication Sig Dispense Refill     metoprolol succinate (TOPROL-XL) 100 MG 24 hr tablet Pt takes 50 mg once a week       carbidopa-levodopa (SINEMET)  MG per tablet 1/2 tablet twice a day x 1 week, then 1/2 tablet 3 times a day x 1 week, then 1 tablet 3 times a day (Patient not taking:  Reported on 6/29/2018) 90 tablet 3          Movement Disorder-related Medications                                                                                                                                                             Allergies: has No Known Allergies.    Social History:   Social History     Social History     Marital status:      Spouse name: N/A     Number of children: N/A     Years of education: N/A     Social History Main Topics     Smoking status: Light Tobacco Smoker     Types: Cigars     Smokeless tobacco: Never Used     Alcohol use Yes     Drug use: No     Sexual activity: Yes     Other Topics Concern     None     Social History Narrative       Family History:  Family History   Problem Relation Age of Onset     Hypertension Father        ROS:  General:  Fever : no, Chills: no, Sweats: no, Fatigue: no, ,Weight loss: no  Cardiovascular  Chest Pains: no, Palpitations: no, Edema: no, Shortness of breath: no  Respiratory  Cough: no  Gastrointestinal  Nausea: no, Vomiting: no, Diarrhea: no, Constipation: no  Genitourinary  Dysuria: no, Frequency: no, Urgency: no,  Nocturia: no, Incontinence: no Musculoskeletal  Back pain: no, Neck Pain: no  Joint pain, swelling, redness: no, Stiffness: no  Skin  Rash: no, Itching: no,  Suspicious lesions: no  Psychiatric  Depression: no, Anxiety/Panic: no  Endocrine  Cold intolerance: no, Heat intolerance: no, Excessive thirst: no  Hematologic/LymphatiAbnormal bruising, bleeding: no ,Enlarged lymph nodes: {.:[id number]  Allergic/Immunologic  Hives (Urticaria): no, HIV exposure: no    Neurologic  Visual loss: no, Double vision: no, Headache: no, Loss of consciousness:  no, Seizure: no, Fainting (syncope): no, Dysarthria: no, Dysphagia:  no, Vertigo:  no, Weakness: no, Atrophy: no, Twitches: no, Lhermitte's: no, Numbness or tingling: no, Handwriting change: no, Tremors: YES, Involuntary movements: no, Imbalance: no, Abnormal gait:  no, Falls :no, Memory loss:  no, RBD: no, Sleep disorder: no, Hallucination: no, Loss of concentration: no,  Behavioral change: no,  Loss of motivation: no      Comprehensive Neurologic Exam    Mental Status Exam   The patient is alert, oriented and exhibits no difficulty with cognition or memory.  A formal short test of mental status was performed.     Neurovascular         Speech and Language   Right Left     Carotid Bruit Absent Absent  Speech is normal.   Dorsalis Pedis Pulse Normal Normal  Description   Posterior Tibial Pulse Normal Normal  Language is normal.     Cranial Nerve Exam                 Right Left   Right Left   II                                        Normal Normal  Hearing (VIII) Normal Normal      III, IV, V!                   Normal Normal  Nystagmus (VIII) Normal Normal   EOM description                              Gag (IX, X) Normal Normal   Facial Sensation (V) Normal Normal  SCM (XI) Normal Normal   Muscles of Mastication (V) Normal Normal  Trapezius (XI) Normal Normal   Facial Strength (VII) Normal Normal  Tongue (XII) Normal Normal     Motor Exam  Strength (*/5 grading)  Muscle                  Right Left  Muscle Right Left   Frontalis                                           Normal Normal  Iliopsoas Normal    Normal          Orbicularis Oculi                     Normal Normal  Quadrideps Normal    Normal        Orbicularis Oris                         Normal Normal  Anterior Tibial Normal Normal      Deltoid Normal Normal  Gastrocnemius Normal    Normal   Biceps Normal Normal  Extensor Hallucis Longus Normal    Normal   Triceps Normal Normal  Toe Extensors Normal    Normal   EDC Normal Normal  Toe Flexor Normal    Normal   Finger Flexors Normal Normal  Other Normal Normal   First Dorsal Interosseous Normal Normal  Other Normal Normal   Hypothenar Normal Normal  Other Normal Normal   Thenar Normal Normal  Other Normal Normal     See UPDRS flowsheet prominent right arm rigidity.  Severe right hand bradykinesia.   Fine tremor of the right hand.  At times looks a bit myoclonic but more likely tremor.  Reduced arm swing with walking.  Drags right leg slightly while walking.  Pull sign negative.  No cortical sensory loss.  No apraxia of the right hand.    UPDRS Values 6/29/2018   Speech 0   Facial Expression 0   Rigidity Neck 0   Rigidity RUE 3   Rigidity LUE 0   Rigidity RLE 0   Rigidity LLE 0   Finger Taps R 2   Finger Taps L 0   Hand Mvt R 2   Hand Mvt L 0   Pron-/Supinate R 3   Pron-/Supinate L 0   Toe Tap R 0   Toe Tap L 0   Leg Agility R 0   Leg Agility L 0   Arise From Chair 0   Gait 0   Gait Freezing 0   Postural Stability 0   Posture 0   Global Spont Mvt 0   Postural Tremor RUE 1   Postural Tremor LUE 0   Kinetic Tremor RUE 0   Kinetic Tremor LUE 0   Rest Tremor RUE 1   Rest Tremor LUE 0   Rest Tremor RLE 0   Rest Tremor LLE 0   Rest Tremor Lip/Jaw 0   Total Right 12   Total Left 0   Axial Total 0       Sensory Exam          Right Left    Pin Normal Normal    Vibration Normal Normal    Joint position Normal Normal    Other             Coding statement:     Duration of  Services: face-to-face 60min.   Greater than 50% of this visit was spent in counseling and coordination of care.    Medical decision making is high due to the following components:    Number of diagnoses:[id number]  Management:Diagnostic tests orders or reviewed.No  Medications were prescribed.No Medications were adjusted.Yes  Complexity of medical data:Outside records reviewed.Yes Radiology images reviewed by myself.Yes Review/order clinical lab tests. No Review/order radiologyYes Discussed tests with performing physician. No Called outside records.No Discussed patient with another provider.Yes Took collateral history.YesRiskOne or more chronic illnesses with severe exacerbation, progression, or side effects of treatment.YesAcute or chronic illness or injury that poses a threat to life or bodily function.No  Abrupt change in neurologic  status.No

## 2018-06-29 NOTE — NURSING NOTE
Marcus Daley's goals for this visit include: consult  He requests these members of his care team be copied on today's visit information:     PCP: Sharona Nicholas    Referring Provider:  Mehul Barraza MD  [address] [place] [address]    /87  Pulse 91  Ht 1.829 m (6')  Wt 92.6 kg (204 lb 3.2 oz)  SpO2 97%  BMI 27.69 kg/m2    Do you need any medication refills at today's visit? n

## 2018-06-29 NOTE — TELEPHONE ENCOUNTER
Called pt and left message communicating that Dr. Rodriguez would like to see how the Sinemet works for him before he writes a letter to the [employer] physician approving him for driving. Silvana Newton RN

## 2018-06-29 NOTE — LETTER
2018         RE: Marcus Daley  [address] [place] [address]        Dear Colleague,    Thank you for referring your patient, Marcus Daley, to the [facility]. Please see a copy of my visit note below.    Department of Neurology  Movement Disorders Division   Initial Clinic Evaluation     Patient: Marcus Daley   MRN: [id number]   : 1960   Date of Visit: 2018     Referring Physician: Madi    Reason for Referral: Parkinsonism    Impression:  1.   Parkinsonism.  This looks most like idiopathic Parkinson's disease with right hemiparetic symptoms.  However the degree of rigidity in the right arm is significant and asymmetric.  Cortical basal syndrome would be a consideration.  However the patient has no apraxia or sensory loss in the right hand.  There is no evidence of mirror movements are alien limb.  I think this is idiopathic Parkinson's disease.    Recommendations:  1.  I recommend that he go ahead and start the trial of carbidopa levodopa.  I instructed him always to take this a half to one hour before meals.  He will escalate it slowly as indicated by Dr. Blake.  He would escalate towards a dose of 1-1/2 tablets 3 times a day as needed.  We should see him back in 2 months after starting the carbidopa levodopa to see his response.  2.  We talked about the value of daily exercise.  3.  He asked about driving and I think if he had a response to carbidopa/levodopa we would have no trouble writing a certificate for safe driving for him.  4.  See back in 2 months.      Please call or write with questions or concerns,    Sincerely yours,    Abelardo Rodriguez MD      History of Present Illness  Mr. Daley is a 58 year old male who is right-handed.    He comes with his wife and his daughter Neva.    There is no family history of Parkinson's disease.  He has no history of severe head injury.  There is no history of exposure to dopamine blocking drugs.   [place] city [place]  He was a coffee drinker and smoked occasional cigars.    He has longtime as anosmia.  He does move in his sleep but he does not talk.    In 2010 he had a voice that was raspy and made it hard to talk.  This lasted for 2 years and then went away.  He never had botulinum toxin injections although this was considered.    About a year ago he noticed that his right arm was not swinging freely when he would walk.  He noticed it but nobody else noticed it.  6 months ago he began to notice that his handwriting was changed.  He had to force the hand to write although it was never really small handwriting.  More recently he noticed that guitar strumming was not natural.  His right hand seems swollen.  Lately he has had some tremor of his right hand that seems like a resting tremor.  He says he is a slower toweling is here with his right hand.  Toothbrushing is slower.  He is beginning to use his left hand more.  He has no trouble turning in bed or standing from a chair.  He has no trouble dressing or buttoning.  He likes to do electronics as a hobby and he does not know any difficulty doing this.  Balance seems okay.  There is some shuffling.  He does not fall.  His memory is good.  He has had no hallucination or fainting.    The patient saw Dr. Blake in May.  Dr. Blake suspected parkinsonism most likely Parkinson's disease.  Carbidopa levodopa was prescribed but the patient did not start this.  The patient had an MRI of the brain which I have reviewed with him.  This looks entirely normal.  I do not see significant midbrain atrophy although the midbrain is not large.  There is no pontine signal change or basal ganglia signal change.      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    Medications:  Current Outpatient Prescriptions   Medication Sig Dispense Refill     metoprolol  succinate (TOPROL-XL) 100 MG 24 hr tablet Pt takes 50 mg once a week       carbidopa-levodopa (SINEMET)  MG per tablet 1/2 tablet twice a day x 1 week, then 1/2 tablet 3 times a day x 1 week, then 1 tablet 3 times a day (Patient not taking: Reported on 6/29/2018) 90 tablet 3          Movement Disorder-related Medications                                                                                                                                                             Allergies: has No Known Allergies.    Social History:   Social History     Social History     Marital status:      Spouse name: N/A     Number of children: N/A     Years of education: N/A     Social History Main Topics     Smoking status: Light Tobacco Smoker     Types: Cigars     Smokeless tobacco: Never Used     Alcohol use Yes     Drug use: No     Sexual activity: Yes     Other Topics Concern     None     Social History Narrative       Family History:  Family History   Problem Relation Age of Onset     Hypertension Father        ROS:  General:  Fever : no, Chills: no, Sweats: no, Fatigue: no, ,Weight loss: no  Cardiovascular  Chest Pains: no, Palpitations: no, Edema: no, Shortness of breath: no  Respiratory  Cough: no  Gastrointestinal  Nausea: no, Vomiting: no, Diarrhea: no, Constipation: no  Genitourinary  Dysuria: no, Frequency: no, Urgency: no,  Nocturia: no, Incontinence: no Musculoskeletal  Back pain: no, Neck Pain: no  Joint pain, swelling, redness: no, Stiffness: no  Skin  Rash: no, Itching: no,  Suspicious lesions: no  Psychiatric  Depression: no, Anxiety/Panic: no  Endocrine  Cold intolerance: no, Heat intolerance: no, Excessive thirst: no  Hematologic/LymphatiAbnormal bruising, bleeding: no ,Enlarged lymph nodes: {.:[id number]  Allergic/Immunologic  Hives (Urticaria): no, HIV exposure: no    Neurologic  Visual loss: no, Double vision: no, Headache: no, Loss of consciousness:  no, Seizure: no, Fainting (syncope): no,  Dysarthria: no, Dysphagia:  no, Vertigo:  no, Weakness: no, Atrophy: no, Twitches: no, Lhermitte's: no, Numbness or tingling: no, Handwriting change: no, Tremors: YES, Involuntary movements: no, Imbalance: no, Abnormal gait:  no, Falls :no, Memory loss: no, RBD: no, Sleep disorder: no, Hallucination: no, Loss of concentration: no,  Behavioral change: no,  Loss of motivation: no      Comprehensive Neurologic Exam    Mental Status Exam   The patient is alert, oriented and exhibits no difficulty with cognition or memory.  A formal short test of mental status was performed.     Neurovascular         Speech and Language   Right Left     Carotid Bruit Absent Absent  Speech is normal.   Dorsalis Pedis Pulse Normal Normal  Description   Posterior Tibial Pulse Normal Normal  Language is normal.     Cranial Nerve Exam                 Right Left   Right Left   II                                        Normal Normal  Hearing (VIII) Normal Normal      III, IV, V!                   Normal Normal  Nystagmus (VIII) Normal Normal   EOM description                              Gag (IX, X) Normal Normal   Facial Sensation (V) Normal Normal  SCM (XI) Normal Normal   Muscles of Mastication (V) Normal Normal  Trapezius (XI) Normal Normal   Facial Strength (VII) Normal Normal  Tongue (XII) Normal Normal     Motor Exam  Strength (*/5 grading)  Muscle                  Right Left  Muscle Right Left   Frontalis                                           Normal Normal  Iliopsoas Normal    Normal          Orbicularis Oculi                     Normal Normal  Quadrideps Normal    Normal        Orbicularis Oris                         Normal Normal  Anterior Tibial Normal Normal      Deltoid Normal Normal  Gastrocnemius Normal    Normal   Biceps Normal Normal  Extensor Hallucis Longus Normal    Normal   Triceps Normal Normal  Toe Extensors Normal    Normal   EDC Normal Normal  Toe Flexor Normal    Normal   Finger Flexors Normal Normal  Other Normal  Normal   First Dorsal Interosseous Normal Normal  Other Normal Normal   Hypothenar Normal Normal  Other Normal Normal   Thenar Normal Normal  Other Normal Normal     See UPDRS flowsheet prominent right arm rigidity.  Severe right hand bradykinesia.  Fine tremor of the right hand.  At times looks a bit myoclonic but more likely tremor.  Reduced arm swing with walking.  Drags right leg slightly while walking.  Pull sign negative.  No cortical sensory loss.  No apraxia of the right hand.    UPDRS Values 6/29/2018   Speech 0   Facial Expression 0   Rigidity Neck 0   Rigidity RUE 3   Rigidity LUE 0   Rigidity RLE 0   Rigidity LLE 0   Finger Taps R 2   Finger Taps L 0   Hand Mvt R 2   Hand Mvt L 0   Pron-/Supinate R 3   Pron-/Supinate L 0   Toe Tap R 0   Toe Tap L 0   Leg Agility R 0   Leg Agility L 0   Arise From Chair 0   Gait 0   Gait Freezing 0   Postural Stability 0   Posture 0   Global Spont Mvt 0   Postural Tremor RUE 1   Postural Tremor LUE 0   Kinetic Tremor RUE 0   Kinetic Tremor LUE 0   Rest Tremor RUE 1   Rest Tremor LUE 0   Rest Tremor RLE 0   Rest Tremor LLE 0   Rest Tremor Lip/Jaw 0   Total Right 12   Total Left 0   Axial Total 0       Sensory Exam          Right Left    Pin Normal Normal    Vibration Normal Normal    Joint position Normal Normal    Other             Coding statement:     Duration of  Services: face-to-face 60min.   Greater than 50% of this visit was spent in counseling and coordination of care.    Medical decision making is high due to the following components:    Number of diagnoses:[id number]  Management:Diagnostic tests orders or reviewed.No  Medications were prescribed.No Medications were adjusted.Yes  Complexity of medical data:Outside records reviewed.Yes Radiology images reviewed by myself.Yes Review/order clinical lab tests. No Review/order radiologyYes Discussed tests with performing physician. No Called outside records.No Discussed patient with another provider.Yes Took  collateral history.YesRiskOne or more chronic illnesses with severe exacerbation, progression, or side effects of treatment.YesAcute or chronic illness or injury that poses a threat to life or bodily function.No  Abrupt change in neurologic status.No                       Again, thank you for allowing me to participate in the care of your patient.        Sincerely,        Abelardo Rodriguez MD

## 2018-07-05 ENCOUNTER — TELEPHONE (OUTPATIENT)
Dept: NEUROLOGY | Facility: CLINIC | Age: 58
End: 2018-07-05

## 2018-07-05 NOTE — TELEPHONE ENCOUNTER
Spoke to the pt and just started taking the carb/levo today. 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 If he feels comfortable with him driving then he would need a letter giving him authorization. His appt was moved to 7/27/18.   Rachel Roche RN

## 2018-07-05 NOTE — TELEPHONE ENCOUNTER
Tried to reach the pt to discuss but there was no answer. Would like to know how the carb/levo is working for him. A message was left on his VM requesting a call back.  Rachel Roche RN

## 2018-07-05 NOTE — TELEPHONE ENCOUNTER
[facility] Call Center    Phone Message    May a detailed message be left on voicemail: yes    Reason for Call: Other: Patient is schedule on 08/17/18 with Dr. Rodriguez and patient is wondering if appointment can be moved up a couple weeks sooner as discussed with care team before to see what is recommended. Please call to advise.     Action Taken: Message routed to:  Adult Clinics: Neurology p [id number]

## 2018-07-05 NOTE — TELEPHONE ENCOUNTER
Patient returned clinics call, requesting a call back. [phone] Preferred Number 1st then cell.

## 2018-07-27 ENCOUNTER — OFFICE VISIT (OUTPATIENT)
Dept: NEUROLOGY | Facility: CLINIC | Age: 58
End: 2018-07-27
Payer: COMMERCIAL

## 2018-07-27 VITALS — HEART RATE: 87 BPM | DIASTOLIC BLOOD PRESSURE: 88 MMHG | OXYGEN SATURATION: 97 % | SYSTOLIC BLOOD PRESSURE: 141 MMHG

## 2018-07-27 DIAGNOSIS — G20.A1 PARKINSON'S DISEASE (H): Primary | ICD-10-CM

## 2018-07-27 PROCEDURE — 99214 OFFICE O/P EST MOD 30 MIN: CPT | Performed by: PSYCHIATRY & NEUROLOGY

## 2018-07-27 ASSESSMENT — UNIFIED PARKINSONS DISEASE RATING SCALE (UPDRS)
GAIT: NORMAL
LEG_AGILITY_LEFT: NORMAL
CONSTANCY_TREMOR_ATREST: SLIGHT: TREMOR AT REST IS PRESENT  25% OF THE ENTIRE EXAMINATION PERIOD.
TOTAL_SCORE_LEFT: 0
FREEZING_GAIT: NORMAL
PARKINSONS_MEDS: ON
HANDMOVEMENTS_LEFT: NORMAL
SPEECH: SLIGHT: LOSS OF MODULATION, DICTION OR VOLUME, BUT STILL ALL WORDS EASY TO UNDERSTAND.
AMPLITUDE_RLE: NORMAL: NO TREMOR.
LEG_AGILITY_RIGHT: NORMAL
RIGIDITY_RLE: SLIGHT: RIGIDITY ONLY DETECTED WITH ACTIVATION MANEUVER.
AXIAL_SCORE: 4
ARISING_CHAIR: NORMAL: ABLE TO ARISE QUICKLY WITHOUT HESITATION.
AMPLITUDE_LLE: NORMAL: NO TREMOR.
FINGER_TAPPING_LEFT: NORMAL
RIGIDITY_LUE: NORMAL
TOETAPPING_RIGHT: NORMAL
RIGIDITY_LLE: NORMAL
POSTURE: 0 NORMAL, NO PROBLEMS
RIGIDITY_RUE: MODERATE: RIGIDITY DETECTED WITHOUT THE ACTIVATION MANEUVER. FULL RANGE OF MOTION IS ACHIEVED WITH EFFORT.
AMPLITUDE_LUE: NORMAL: NO TREMOR.
RIGIDITY_NECK: MILD: RIGIDITY DETECTED WITHOUT THE ACTIVATION MANEUVER.  FULL RANGE OF MOTION IS EASILY ACHIEVED.
TOTAL_SCORE: 10
FACIAL_EXPRESSION: SLIGHT: MINIMAL MASKED FACIES MANIFESTED ONLY BY DECREASED FREQUENCY OF BLINKING.
TOETAPPING_LEFT: NORMAL
POSTURAL_STABILITY: NORMAL:  RECOVERS WITH ONE OR TWO STEPS.
PRONATION_SUPINATION_LEFT: NORMAL
FINGER_TAPPING_RIGHT: MODERATE: ANY OF THE FOLLOWING:  A) MORE THAN 5 INTERRUPTIONS OR AT LEAST ONE LONGER ARREST (FREEZE) IN ONGOING MOVEMENT  B) MODERATE SLOWING C) THE AMPLITUDE DECREMENTS STARTING AFTER THE FIRST MOVEMENT.
PRONATION_SUPINATION_RIGHT: NORMAL
HANDMOVEMENTS_RIGHT: MILD: ANY OF THE FOLLOWING: A) 3 TO 5 INTERRUPTIONS DURING TAPPING B) MILD SLOWING C) THE AMPLITUDE DECREMENTS MIDWAY IN THE 10-MOVEMENT SEQUENCE
AMPLITUDE_RUE: NORMAL: NO TREMOR.
SPONTANEITY_OF_MOVEMENT: 0: NORMAL.  NO PROBLEMS.
TOTAL_SCORE: 16
AMPLITUDE_LIP_JAW: SLIGHT: < 1 CM IN MAXIMAL AMPLITUDE.

## 2018-07-27 ASSESSMENT — PAIN SCALES - GENERAL: PAINLEVEL: NO PAIN (0)

## 2018-07-27 NOTE — PROGRESS NOTES
Movement Disorder Clinic follow up note    Patient: Marcus Daley  Medical Record Number: [id number]  Encounter Date: July 27, 2018  PCP:Murphy Jalloh    CC: Parkinson's disease     Impression:  1.  Idiopathic Parkinson's disease  2.  Good initial response to carbidopa/levodopa    Recommendations:  1.  The patient has a good response to his early treatment with carbidopa/levodopa.  This is an excellent sign that he does not have a more severe form of parkinsonism such as cortical basal degeneration.  At this point he can continue continue the carbidopa/levodopa 25/100, 1 tablet 3 times a day for a week.  He can then go up to carbidopa/levodopa 25 100, 1-1/2 tablets 3 times a day which will be his target dose.  2.  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  I will send that today.  3.  I will see him back in 3 months.    Return to clinic: 3 months    Interval Hx:: Mr. Marcus Daley returns to clinic today for follow-up of parkinsonism.  He is on carbidopa/levodopa 25/100, 1 tablet 3 times a day on an empty stomach for just the past 2 days.  Even on this low dose and during the short period of time he notices improvement.  His handwriting has improved.  He can strum his guitar better.  His tremor has improved.  He has had objective improvement already as his exam shows lessening rigidity and bradykinesia on the right side.    He is tolerating the medicine without side effects.    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  I do not see any reason he cannot given his minimal motor disability at this time.  He would need a letter of support in this regard and I told him I would provide it.    Current medications    Movement Disorder-related Medications                                                                                                                                                             Current Outpatient Prescriptions   Medication      carbidopa-levodopa (SINEMET)  MG per tablet     metoprolol succinate (TOPROL-XL) 100 MG 24 hr tablet     No current facility-administered medications for this visit.        Examination:  /88  Pulse 87  SpO2 97%  General- no distress, no rash, good pulses, no edema  Respiratory-auscultation over the anterior lung fields is clear  Cardiac- regular rate and rhythm    Neurologic  Mental status  Patient is alert, appropriate, speech is fluent and comprehension is intact    Cranial nerve testing  Pupils are equal and reactive to light, visual fields are full to confrontation bilaterally  Extraocular movements are full  Facial sensation is intact, face is symmetric with rest and activation  Palate rises symmetrically, tongue protrudes at midline with normal movements  Sterocleidomastoid and trapezius strength is normal and symmetric    UPDRS Values 7/27/2018   Time: 12:00 PM   Medication On   R Brain DBS: None   L Brain DBS: None   Speech 1   Facial Expression 1   Rigidity Neck 2   Rigidity RUE 3   Rigidity LUE 0   Rigidity RLE 1   Rigidity LLE 0   Finger Taps R 3   Finger Taps L 0   Hand Mvt R 2   Hand Mvt L 0   Pron-/Supinate R 0   Pron-/Supinate L 0   Toe Tap R 0   Toe Tap L 0   Leg Agility R 0   Leg Agility L 0   Arise From Chair 0   Gait 0   Gait Freezing 0   Postural Stability 0   Posture 0   Global Spont Mvt 0   Postural Tremor RUE 1   Postural Tremor LUE 0   Kinetic Tremor RUE 0   Kinetic Tremor LUE 0   Rest Tremor RUE 0   Rest Tremor LUE 0   Rest Tremor RLE 0   Rest Tremor LLE 0   Rest Tremor Lip/Jaw 1   Rest Tremor Constancy 1   Total Right 10   Total Left 0   Axial Total 4   Total 16   Strength is 5/5 shoulder abduction, finger abduction, arm flexion and extension, hip flexion, plantar and dorsiflexion    Sensation  Intact to pin, vibration   Proprioception intact in great toes and fingers    Tendon reflexes  Testing at brachioradialis, biceps, triceps, patella and achilles bilaterally showed  normal reflexes, symmetrically, without Babinski or Chew signs    Coordination  Finger nose finger testing: normalRapid alternating movements are normal.  Gait and Station: normal    25 minutes of total time was spent face-to-face with the patient over 50% of which was counseling..

## 2018-07-27 NOTE — NURSING NOTE
Marcus Daley's goals for this visit include: return  He requests these members of his care team be copied on today's visit information:     PCP: Murphy Jalloh    Referring Provider:  No referring provider defined for this encounter.    /88  Pulse 87  SpO2 97%    Do you need any medication refills at today's visit? n

## 2018-07-27 NOTE — LETTER
7/27/2018         RE: Marcus Daley  [address] [place] [place] [address]        Dear Colleague,    Thank you for referring your patient, Marcus Daley, to the [facility]. Please see a copy of my visit note below.    Movement Disorder Clinic follow up note    Patient: Marcus Daley  Medical Record Number: [id number]  Encounter Date: July 27, 2018  PCP:Murphy Jalloh    CC: Parkinson's disease     Impression:  1.  Idiopathic Parkinson's disease  2.  Good initial response to carbidopa/levodopa    Recommendations:  1.  The patient has a good response to his early treatment with carbidopa/levodopa.  This is an excellent sign that he does not have a more severe form of parkinsonism such as cortical basal degeneration.  At this point he can continue continue the carbidopa/levodopa 25/100, 1 tablet 3 times a day for a week.  He can then go up to carbidopa/levodopa 25 100, 1-1/2 tablets 3 times a day which will be his target dose.  2.  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  I will send that today.  3.  I will see him back in 3 months.    Return to clinic: 3 months    Interval Hx:: Mr. Marcus Daley returns to clinic today for follow-up of parkinsonism.  He is on carbidopa/levodopa 25/101 tablet 3 times a day on an empty stomach for just the past 2 days.  Even on this low dose and during the short period of time he notices improvement.  His handwriting has improved.  He can strum his guitar better.  His tremor has improved.  He has had objective improvement already as his exam shows lessening rigidity and bradykinesia on the right side.    He is tolerating the medicine without side effects.    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  I do not see any reason he cannot given his minimal motor disability at this time.  He would need a letter of support in this regard and I told him I would provide it.    Current  medications    Movement Disorder-related Medications                                                                                                                                                             Current Outpatient Prescriptions   Medication     carbidopa-levodopa (SINEMET)  MG per tablet     metoprolol succinate (TOPROL-XL) 100 MG 24 hr tablet     No current facility-administered medications for this visit.        Examination:  /88  Pulse 87  SpO2 97%  General- no distress, no rash, good pulses, no edema  Respiratory-auscultation over the anterior lung fields is clear  Cardiac- regular rate and rhythm    Neurologic  Mental status  Patient is alert, appropriate, speech is fluent and comprehension is intact    Cranial nerve testing  Pupils are equal and reactive to light, visual fields are full to confrontation bilaterally  Extraocular movements are full  Facial sensation is intact, face is symmetric with rest and activation  Palate rises symmetrically, tongue protrudes at midline with normal movements  Sterocleidomastoid and trapezius strength is normal and symmetric    UPDRS Values 7/27/2018   Time: 12:00 PM   Medication On   R Brain DBS: None   L Brain DBS: None   Speech 1   Facial Expression 1   Rigidity Neck 2   Rigidity RUE 3   Rigidity LUE 0   Rigidity RLE 1   Rigidity LLE 0   Finger Taps R 3   Finger Taps L 0   Hand Mvt R 2   Hand Mvt L 0   Pron-/Supinate R 0   Pron-/Supinate L 0   Toe Tap R 0   Toe Tap L 0   Leg Agility R 0   Leg Agility L 0   Arise From Chair 0   Gait 0   Gait Freezing 0   Postural Stability 0   Posture 0   Global Spont Mvt 0   Postural Tremor RUE 1   Postural Tremor LUE 0   Kinetic Tremor RUE 0   Kinetic Tremor LUE 0   Rest Tremor RUE 0   Rest Tremor LUE 0   Rest Tremor RLE 0   Rest Tremor LLE 0   Rest Tremor Lip/Jaw 1   Rest Tremor Constancy 1   Total Right 10   Total Left 0   Axial Total 4   Total 16   Strength is 5/5 shoulder abduction, finger abduction, arm  flexion and extension, hip flexion, plantar and dorsiflexion    Sensation  Intact to pin, vibration   Proprioception intact in great toes and fingers    Tendon reflexes  Testing at brachioradialis, biceps, triceps, patella and achilles bilaterally showed normal reflexes, symmetrically, without Babinski or Chew signs    Coordination  Finger nose finger testing: normalRapid alternating movements are normal.  Gait and Station: normal    25 minutes of total time was spent face-to-face with the patient over 50% of which was counseling..      Again, thank you for allowing me to participate in the care of your patient.        Sincerely,        Abelardo Rodriguez MD

## 2018-07-27 NOTE — PATIENT INSTRUCTIONS
#1  Increase carbidopa/levodopa 25/100 to 1.5 tabs three times a day on an empty stomach in 1 week  #2 Return in 3 months  #3 instructions below regarding alcohol intake.       Abelardo Rodriguez MD   You 17 minutes ago (9:07 AM)               I did get mixed up.  He can go up in 1 week.     Can drink one drink a day.  No more as his balance may be worsened.

## 2018-07-30 ENCOUNTER — TELEPHONE (OUTPATIENT)
Dept: NEUROLOGY | Facility: CLINIC | Age: 58
End: 2018-07-30

## 2018-07-30 DIAGNOSIS — G20.A1 PARKINSON'S DISEASE (H): Primary | ICD-10-CM

## 2018-07-30 NOTE — TELEPHONE ENCOUNTER
Spoke with Ronel, who wanted clarification on when to increase the Sinemet to 1.5 tablets. Dr. Rodriguez's office note states 1 week and the AVS recommendations state increase in 3 weeks.    She also wanted to know if there was a recommended limit of the amount of alcohol an individual can drink while on Sinemet? Marcus is supposed to go to an annual golf trip and Ronel reports they do drink quite a bit while they are there (3-5 drinks vs 1 drink) and she wanted to know if there are any recommended restrictions?     Will route to Dr. Rodriguez for review. Silvana Newton, RN

## 2018-07-30 NOTE — TELEPHONE ENCOUNTER
[facility] Call Center    Phone Message    May a detailed message be left on voicemail: yes    Reason for Call: Other: patient's wife is calling for clarification on information regarding how many pills he should be taking daily and how much alcohol consumption he is allowed, please advise     Action Taken: Message routed to:  Adult Clinics: Neurology p [id number]

## 2018-07-31 NOTE — TELEPHONE ENCOUNTER
I did get mixed up.  He can go up in 1 week.    Can drink one drink a day.  No more as his balance may be worsened.

## 2018-07-31 NOTE — TELEPHONE ENCOUNTER
Called and spoke with Marcus to go over Dr. Rodriguez's recommendations. I did email them a copy of Dr. Rodriguez's AVS. Silvana Newton RN

## 2018-08-09 ENCOUNTER — TELEPHONE (OUTPATIENT)
Dept: NEUROLOGY | Facility: CLINIC | Age: 58
End: 2018-08-09

## 2018-08-09 ENCOUNTER — TELEPHONE (OUTPATIENT)
Dept: FAMILY MEDICINE | Facility: CLINIC | Age: 58
End: 2018-08-09

## 2018-08-09 NOTE — TELEPHONE ENCOUNTER
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     Phone Number Patient can be reached at: Cell number on file:    Telephone Information:   Mobile [phone]       Best Time: anytime     Can we leave a detailed message on this number? YES    Call taken on 8/9/2018 at 1:25 PM by Vaughn Baker

## 2018-08-09 NOTE — TELEPHONE ENCOUNTER
Spoke with the patient to schedule a 3 month follow up with  (due in Oct). The patient informed me that his insurance does not cover all Providers and the visits cost him any where from $250-300 each time. He is very reluctant to come into the clinic due a high medical bill already, he would like to know if a telephone call would be ok?    He also mentioned that for the past 5 days he has been experiencing some balance issues, and is wondering if it is the disease or possibly the medication?    The patient would like a call back to discuss his concerns, on his cell phone. Thank you.    Harmony GOLDMAN [facility]~Specialty/Med Surg   [phone]

## 2018-08-09 NOTE — TELEPHONE ENCOUNTER
"Spoke to Marcus and he reports having symptoms of dizziness and a \"head spinning sensation\". He states that it started rather abruptly on Saturday morning while getting out of bed. Since then it has been slowly getting better but is concerned that the Sinemet is the cause. The pt started the medication a month ago and this is the first time he has experienced these symptoms.   I explained that this is not a typical side effect of Sinemet and to call his PCP if his symptoms do not improve or get worse.   He verbalized understanding and had no further questions.  Rachel Roche RN    "

## 2018-08-09 NOTE — TELEPHONE ENCOUNTER
Patient wanted to know if something on his DOT card was incorrect. Spoke with the provider and she stated that there was no mistake made on the card and informed patient. Patient was a tad upset and stated he wished the provider would speak with him about that.    Manda Peterson MA

## 2018-08-09 NOTE — TELEPHONE ENCOUNTER
Called the pt back to discuss but was unable to reach them, a message was left on their VM requesting a call back to the clinic.   Rachel Roche RN

## 2018-09-06 ENCOUNTER — TELEPHONE (OUTPATIENT)
Dept: NEUROLOGY | Facility: CLINIC | Age: 58
End: 2018-09-06

## 2018-09-06 DIAGNOSIS — G20.A1 PARALYSIS AGITANS (H): ICD-10-CM

## 2018-09-06 NOTE — TELEPHONE ENCOUNTER
[facility] Call Center    Phone Message    May a detailed message be left on voicemail: no    Reason for Call: Patient called and has some questions regarding medication and possible follow up appointments. Requesting a call back tomorrow 9/7/18 between 9 AM and 1 PM. Thank you    Action Taken: Message routed to:  Adult Clinics: Neurology p [id number]

## 2018-09-07 NOTE — TELEPHONE ENCOUNTER
"Spoke with patient, he reports he is now at target dose of Sinemet 1.5 tabs TID and is feeling \"back to normal\". He feels like he has gotten good effect from the medication and is not experiencing any side effects. He will need a refill within the next month and wanted to find out about the necessity of an appt with Dr. Rodriguez? He pays ~$300 every time he comes in for a visit. We did discuss that Dr. Rodriguez is not offering telephone consults at this time, but that the pt would still receive a bill for a telephone visit were it available. He would like to know if he can push back his follow up appt with Dr. Rodriguez since things are going well and wants to know when Dr. Rodriguez recommends he come in next. Silvana Newton RN    "

## 2018-09-07 NOTE — TELEPHONE ENCOUNTER
Marcus returned a call to the Neurology Care Team.  He will wait for a call back. [phone].  He would like you to try both numbers.

## 2018-09-07 NOTE — TELEPHONE ENCOUNTER
OK to push back visit and good news!  I do like to do a followup visit to document the response.  We can try to keep it brief

## 2018-09-10 RX ORDER — CARBIDOPA AND LEVODOPA 25; 100 MG/1; MG/1
1.5 TABLET ORAL 3 TIMES DAILY
Qty: 405 TABLET | Refills: 3 | Status: SHIPPED | OUTPATIENT
Start: 2018-09-10 | End: 2019-11-21

## 2018-09-10 NOTE — TELEPHONE ENCOUNTER
Left message for pt communicating that he can push back his appt and he should call [phone] to reschedule his follow up with Dr. Rodriguez prior to the end of the year. Silvana Newton RN

## 2018-11-16 ENCOUNTER — OFFICE VISIT (OUTPATIENT)
Dept: NEUROLOGY | Facility: CLINIC | Age: 58
End: 2018-11-16
Payer: COMMERCIAL

## 2018-11-16 VITALS
BODY MASS INDEX: 27.98 KG/M2 | WEIGHT: 206.3 LBS | SYSTOLIC BLOOD PRESSURE: 130 MMHG | DIASTOLIC BLOOD PRESSURE: 81 MMHG | HEART RATE: 89 BPM | OXYGEN SATURATION: 97 %

## 2018-11-16 DIAGNOSIS — G20.A1 PARKINSON'S DISEASE (H): Primary | ICD-10-CM

## 2018-11-16 PROCEDURE — 99214 OFFICE O/P EST MOD 30 MIN: CPT | Performed by: PSYCHIATRY & NEUROLOGY

## 2018-11-16 ASSESSMENT — UNIFIED PARKINSONS DISEASE RATING SCALE (UPDRS)
TOTAL_SCORE_LEFT: 0
RIGIDITY_RLE: NORMAL
RIGIDITY_LLE: NORMAL
LEG_AGILITY_LEFT: NORMAL
AMPLITUDE_RLE: NORMAL: NO TREMOR.
AMPLITUDE_RUE: NORMAL: NO TREMOR.
FINGER_TAPPING_RIGHT: MILD: ANY OF THE FOLLOWING: A) 3 TO 5 INTERRUPTIONS DURING TAPPING B) MILD SLOWING C) THE AMPLITUDE DECREMENTS MIDWAY IN THE 10-MOVEMENT SEQUENCE
GAIT: NORMAL
HANDMOVEMENTS_RIGHT: NORMAL
RIGIDITY_RUE: MODERATE: RIGIDITY DETECTED WITHOUT THE ACTIVATION MANEUVER. FULL RANGE OF MOTION IS ACHIEVED WITH EFFORT.
HANDMOVEMENTS_LEFT: NORMAL
POSTURAL_STABILITY: NORMAL:  RECOVERS WITH ONE OR TWO STEPS.
CONSTANCY_TREMOR_ATREST: NORMAL: NO TREMOR.
AMPLITUDE_LLE: NORMAL: NO TREMOR.
FREEZING_GAIT: NORMAL
TOTAL_SCORE: 6
SPEECH: NORMAL
RIGIDITY_LUE: NORMAL
TOTAL_SCORE: 5
TOETAPPING_LEFT: NORMAL
ARISING_CHAIR: NORMAL: ABLE TO ARISE QUICKLY WITHOUT HESITATION.
PRONATION_SUPINATION_RIGHT: NORMAL
PRONATION_SUPINATION_LEFT: NORMAL
AMPLITUDE_LUE: NORMAL: NO TREMOR.
SPONTANEITY_OF_MOVEMENT: 0: NORMAL.  NO PROBLEMS.
AXIAL_SCORE: 1
LEG_AGILITY_RIGHT: NORMAL
RIGIDITY_NECK: NORMAL
FACIAL_EXPRESSION: SLIGHT: MINIMAL MASKED FACIES MANIFESTED ONLY BY DECREASED FREQUENCY OF BLINKING.
FINGER_TAPPING_LEFT: NORMAL
POSTURE: 0 NORMAL, NO PROBLEMS
AMPLITUDE_LIP_JAW: NORMAL: NO TREMOR.
TOETAPPING_RIGHT: NORMAL

## 2018-11-16 ASSESSMENT — PAIN SCALES - GENERAL: PAINLEVEL: NO PAIN (0)

## 2018-11-16 NOTE — NURSING NOTE
Marcus Daley's goals for this visit include: return  He requests these members of his care team be copied on today's visit information:     PCP: Murphy Jalloh    Referring Provider:  No referring provider defined for this encounter.    /81  Pulse 89  Wt 93.6 kg (206 lb 4.8 oz)  SpO2 97%  BMI 27.98 kg/m2    Do you need any medication refills at today's visit? n

## 2018-11-16 NOTE — LETTER
11/16/2018         RE: Marcus Daley  [address] [place] [address]        Dear Colleague,    Thank you for referring your patient, Marcus Daley, to the [facility]. Please see a copy of my visit note below.    Movement Disorder Clinic follow up note    Patient: Marcus Daley  Medical Record Number: [id number]  Encounter Date: November 16, 2018  PCP:Murphy Jalloh    CC: Parkinson's disease    Impression:  1.  Idiopathic Parkinson's disease  2.  Continued excellent response to carbidopa levodopa  3.  REM behavior disorder    Recommendations:  1.  Continue carbidopa/levodopa, 25/100, 1 tablet 3 times a day.  2.  Trial of melatonin 5 mg at bedtime for dream enactment  3.  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  I will see him back in 6 months and if he is stable we can see him on a yearly basis.    Return to clinic: 6 months    Interval Hx:: Mr. Marcus Daley returns to clinic today for follow-up of his mild Parkinson's disease.  He is really doing very well.  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  He says this is more active and he is up and around and more interactive and he feels better with this.  He was up to carbidopa/levodopa, 25/100, 1-1/2 tablets 3 times a day.  He felt he was not doing as well and was perhaps a bit more sleepy.  He cut that back to 1 tablet 3 times a day and feels well.    He really has few parkinsonian symptoms at this time.  He is riding a bike at [employer] 20 minutes a day and exercising after this.  He is very active.  He is not having excessive tremor.  He is really having few symptoms whatsoever.  He has no sense of on and no wearing off.  He has no dyskinesia.    Speech is normal.  Handwriting is virtually normal.  Manual dexterity is good at his new job.  He has no falls freezing or cognitive disorder.      Current medications    Movement Disorder-related Medications                    AM noon PM     Carbidopa/levodopa  25/100                                                1 1 1                                                                                 Current Outpatient Prescriptions   Medication     carbidopa-levodopa (SINEMET)  MG per tablet     metoprolol succinate (TOPROL-XL) 100 MG 24 hr tablet     No current facility-administered medications for this visit.        Examination:  /81  Pulse 89  Wt 93.6 kg (206 lb 4.8 oz)  SpO2 97%  BMI 27.98 kg/m2  General- no distress, no rash, good pulses, no edema  Respiratory-auscultation over the anterior lung fields is clear  Cardiac- regular rate and rhythm    Neurologic  Mental status  Patient is alert, appropriate, speech is fluent and comprehension is intact    Cranial nerve testing  Pupils are equal and reactive to light, visual fields are full to confrontation bilaterally  Extraocular movements are full  Facial sensation is intact, face is symmetric with rest and activation  Palate rises symmetrically, tongue protrudes at midline with normal movements  Sterocleidomastoid and trapezius strength is normal and symmetric    Motor  UPDRS Values 7/27/2018 11/16/2018   Time: 12:00 PM    Medication On    R Brain DBS: None    L Brain DBS: None    Speech 1 0   Facial Expression 1 1   Rigidity Neck 2 0   Rigidity RUE 3 3   Rigidity LUE 0 0   Rigidity RLE 1 0   Rigidity LLE 0 0   Finger Taps R 3 2   Finger Taps L 0 0   Hand Mvt R 2 0   Hand Mvt L 0 0   Pron-/Supinate R 0 0   Pron-/Supinate L 0 0   Toe Tap R 0 0   Toe Tap L 0 0   Leg Agility R 0 0   Leg Agility L 0 0   Arise From Chair 0 0   Gait 0 0   Gait Freezing 0 0   Postural Stability 0 0   Posture 0 0   Global Spont Mvt 0 0   Postural Tremor RUE 1 0   Postural Tremor LUE 0 0   Kinetic Tremor RUE 0 0   Kinetic Tremor LUE 0 0   Rest Tremor RUE 0 0   Rest Tremor LUE 0 0   Rest Tremor RLE 0 0   Rest Tremor LLE 0 0   Rest Tremor Lip/Jaw 1 0   Rest Tremor Constancy 1 0   Total Right 10 5    Total Left 0 0   Axial Total 4 1   Total 16 6   Sensation  Intact to pin, vibration   Proprioception intact in great toes and fingers    Tendon reflexes  Testing at brachioradialis, biceps, triceps, patella and achilles bilaterally showed normal reflexes, symmetrically, without Babinski or Chew signs    Coordination  Finger nose finger testing: normalRapid alternating movements are normal.  Gait and Station: normal    25 minutes of total time was spent face-to-face with the patient over 50% of which was counseling..      Again, thank you for allowing me to participate in the care of your patient.        Sincerely,        Abelardo Rodriguez MD

## 2018-11-16 NOTE — MR AVS SNAPSHOT
After Visit Summary   11/16/2018    Marcus Daley    MRN: [id number]           Patient Information     Date Of Birth          1960        Visit Information        Provider Department      11/16/2018 10:00 AM Abelardo Rodriguez MD [facility]        Care Instructions    1.  Continue current medication  Carbidopa/levodopa 25/100 one tablet, at least one half hour before meals  2.  Try Melatonin 5 mg at bedtime  3.  Letter written for DOT certifcation  4.  6 month return          Follow-ups after your visit        Follow-up notes from your care team     Return in about 6 months (around 5/16/2019).      Your next 10 appointments already scheduled     May 24, 2019 10:00 AM CDT   Return Visit with Abelardo Rodriguez MD   [facility]    [address] [place] [id number] [phone]              Who to contact     If you have questions or need follow up information about today's clinic visit or your schedule please contact [facility] directly at [phone].  Normal or non-critical lab and imaging results will be communicated to you by MyChart, letter or phone within 4 business days after the clinic has received the results. If you do not hear from us within 7 days, please contact the clinic through [facility] or phone. If you have a critical or abnormal lab result, we will notify you by phone as soon as possible.  Submit refill requests through [employer] or call your pharmacy and they will forward the refill request to us. Please allow 3 business days for your refill to be completed.          Additional Information About Your Visit        [facility] Information     [employer] gives you secure access to your electronic health record. If you see a primary care provider, you can also send messages to your care team and make appointments. If you have questions, please call your primary care clinic.  If you do not have  a primary care provider, please call [phone] and they will assist you.      [employer] is an electronic gateway that provides easy, online access to your medical records. With [employer], you can request a clinic appointment, read your test results, renew a prescription or communicate with your care team.     To access your existing account, please contact your [facility] or call [phone] for assistance.        Care EveryWhere ID     This is your Care EveryWhere ID. This could be used by other organizations to access your [place] medical records  [id number]        Your Vitals Were     Pulse Pulse Oximetry BMI (Body Mass Index)             89 97% 27.98 kg/m2          Blood Pressure from Last 3 Encounters:   11/16/18 130/81   07/27/18 141/88   06/29/18 147/87    Weight from Last 3 Encounters:   11/16/18 93.6 kg (206 lb 4.8 oz)   06/29/18 92.6 kg (204 lb 3.2 oz)   05/24/18 92.4 kg (203 lb 12.8 oz)              Today, you had the following     No orders found for display       Primary Care Provider Office Phone # Fax #    Murphy Jalloh [phone] [address] [facility] [address]        Equal Access to Services     BANDAR MCDUFFIE : Hadii aad ku hadasho Soomaali, waaxda luqadaha, qaybta kaalmada adeegyada, waxay idiin hayaan aimeeeg kharamohamud la'dakotah chavarria. So [facility] [phone].    ATENCIÓN: Si habla español, tiene a hadley disposición servicios gratOne Kings Laneos de asistencia lingüística. Llame al [phone].    We comply with applicable federal civil rights laws and [place] laws. We do not discriminate on the basis of race, color, national origin, age, disability, sex, sexual orientation, or gender identity.            Thank you!     Thank you for choosing [facility]  for your care. Our goal is always to provide you with excellent care. Hearing back from our patients is one way we can continue to improve our services. Please take a few minutes to  complete the written survey that you may receive in the mail after your visit with us. Thank you!             Your Updated Medication List - Protect others around you: Learn how to safely use, store and throw away your medicines at www.disposemymeds.org.          This list is accurate as of 11/16/18 10:42 AM.  Always use your most recent med list.                   Brand Name Dispense Instructions for use Diagnosis    carbidopa-levodopa  MG per tablet    SINEMET    405 tablet    Take 1.5 tablets by mouth 3 times daily    Paralysis agitans (H)       metoprolol succinate 100 MG 24 hr tablet    TOPROL-XL     Pt takes 50 mg once a week

## 2018-11-16 NOTE — PROGRESS NOTES
Movement Disorder Clinic follow up note    Patient: Marcus Daley  Medical Record Number: [id number]  Encounter Date: November 16, 2018  PCP:Murphy Jalloh    CC: Parkinson's disease    Impression:  1.  Idiopathic Parkinson's disease  2.  Continued excellent response to carbidopa levodopa  3.  REM behavior disorder    Recommendations:  1.  Continue carbidopa/levodopa, 25/100, 1 tablet 3 times a day.  2.  Trial of melatonin 5 mg at bedtime for dream enactment  3.  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  I will see him back in 6 months and if he is stable we can see him on a yearly basis.    Return to clinic: 6 months    Interval Hx:: Mr. Marcus Daley returns to clinic today for follow-up of his mild Parkinson's disease.  He is really doing very well.  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  He says this is more active and he is up and around and more interactive and he feels better with this.  He was up to carbidopa/levodopa, 25/100, 1-1/2 tablets 3 times a day.  He felt he was not doing as well and was perhaps a bit more sleepy.  He cut that back to 1 tablet 3 times a day and feels well.    He really has few parkinsonian symptoms at this time.  He is riding a bike at [employer] 20 minutes a day and exercising after this.  He is very active.  He is not having excessive tremor.  He is really having few symptoms whatsoever.  He has no sense of on and no wearing off.  He has no dyskinesia.    Speech is normal.  Handwriting is virtually normal.  Manual dexterity is good at his new job.  He has no falls freezing or cognitive disorder.      Current medications    Movement Disorder-related Medications                   AM noon PM     Carbidopa/levodopa  25/100                                                1 1 1                                                                                 Current Outpatient Prescriptions   Medication     carbidopa-levodopa (SINEMET)   MG per tablet     metoprolol succinate (TOPROL-XL) 100 MG 24 hr tablet     No current facility-administered medications for this visit.        Examination:  /81  Pulse 89  Wt 93.6 kg (206 lb 4.8 oz)  SpO2 97%  BMI 27.98 kg/m2  General- no distress, no rash, good pulses, no edema  Respiratory-auscultation over the anterior lung fields is clear  Cardiac- regular rate and rhythm    Neurologic  Mental status  Patient is alert, appropriate, speech is fluent and comprehension is intact    Cranial nerve testing  Pupils are equal and reactive to light, visual fields are full to confrontation bilaterally  Extraocular movements are full  Facial sensation is intact, face is symmetric with rest and activation  Palate rises symmetrically, tongue protrudes at midline with normal movements  Sterocleidomastoid and trapezius strength is normal and symmetric    Motor  UPDRS Values 7/27/2018 11/16/2018   Time: 12:00 PM    Medication On    R Brain DBS: None    L Brain DBS: None    Speech 1 0   Facial Expression 1 1   Rigidity Neck 2 0   Rigidity RUE 3 3   Rigidity LUE 0 0   Rigidity RLE 1 0   Rigidity LLE 0 0   Finger Taps R 3 2   Finger Taps L 0 0   Hand Mvt R 2 0   Hand Mvt L 0 0   Pron-/Supinate R 0 0   Pron-/Supinate L 0 0   Toe Tap R 0 0   Toe Tap L 0 0   Leg Agility R 0 0   Leg Agility L 0 0   Arise From Chair 0 0   Gait 0 0   Gait Freezing 0 0   Postural Stability 0 0   Posture 0 0   Global Spont Mvt 0 0   Postural Tremor RUE 1 0   Postural Tremor LUE 0 0   Kinetic Tremor RUE 0 0   Kinetic Tremor LUE 0 0   Rest Tremor RUE 0 0   Rest Tremor LUE 0 0   Rest Tremor RLE 0 0   Rest Tremor LLE 0 0   Rest Tremor Lip/Jaw 1 0   Rest Tremor Constancy 1 0   Total Right 10 5   Total Left 0 0   Axial Total 4 1   Total 16 6   Sensation  Intact to pin, vibration   Proprioception intact in great toes and fingers    Tendon reflexes  Testing at brachioradialis, biceps, triceps, patella and achilles bilaterally showed normal  reflexes, symmetrically, without Babinski or Chew signs    Coordination  Finger nose finger testing: normalRapid alternating movements are normal.  Gait and Station: normal    25 minutes of total time was spent face-to-face with the patient over 50% of which was counseling..

## 2019-06-19 ENCOUNTER — TRANSFERRED RECORDS (OUTPATIENT)
Dept: HEALTH INFORMATION MANAGEMENT | Facility: CLINIC | Age: 59
End: 2019-06-19

## 2019-06-19 ENCOUNTER — TELEPHONE (OUTPATIENT)
Dept: NEUROLOGY | Facility: CLINIC | Age: 59
End: 2019-06-19

## 2019-06-19 LAB
CREAT SERPL-MCNC: 1.71 MG/DL (ref 0.7–1.3)
GFR SERPL CREATININE-BSD FRML MDRD: 41 ML/MIN
GLUCOSE SERPL-MCNC: 141 MG/DL (ref 74–106)
POTASSIUM SERPL-SCNC: 4.5 MMOL/L (ref 3.5–5.1)

## 2019-06-19 NOTE — TELEPHONE ENCOUNTER
[facility] Call Center    Phone Message    May a detailed message be left on voicemail: yes    Reason for Call: Patient's wife Ronel called to schedule a hospital discharge follow up appointment, Ronel request message to provider sent to notify Patient had a seizure this morning discharged 06/19/2019. Patient was advised at discharge to follow up with Neuro for test. Please be advised.        Action Taken: Message routed to:  Adult Clinics: Neurology p [phone]

## 2019-06-19 NOTE — TELEPHONE ENCOUNTER
Spoke with pt's wife Ronel, states they are scheduled to see Dr. Rodriguez on Friday morning. States that [facility] recommended neuro testing as soon as possible- we discussed most likely EEG.     Records in Care Everywhere from [facility] ED visit this morning.    Pt's wife wondering if seizures can be a part of PD? Will await to hear from Dr. Rodriguez. Silvana Newton RN

## 2019-06-20 NOTE — TELEPHONE ENCOUNTER
The pt's wife was informed of Dr Rodriguez's response. They will discuss their questions tomorrow in clinic.  Rachel Roche, RNCC  Neurology

## 2019-06-20 NOTE — TELEPHONE ENCOUNTER
Abelardo Rodriguez MD Noack, Katy, RN 8 hours ago (7:45 AM)      Marc Pina,     No PD should not cause seizures.  However unclear if he had a seizure or dream enactment.  Can discuss and try to sort this out when I see him.     Thanks,

## 2019-06-21 ENCOUNTER — OFFICE VISIT (OUTPATIENT)
Dept: NEUROLOGY | Facility: CLINIC | Age: 59
End: 2019-06-21
Payer: COMMERCIAL

## 2019-06-21 VITALS
WEIGHT: 208.4 LBS | SYSTOLIC BLOOD PRESSURE: 127 MMHG | OXYGEN SATURATION: 96 % | BODY MASS INDEX: 28.26 KG/M2 | HEART RATE: 79 BPM | DIASTOLIC BLOOD PRESSURE: 81 MMHG

## 2019-06-21 DIAGNOSIS — R40.4 SPELL OF ALTERED CONSCIOUSNESS: Primary | ICD-10-CM

## 2019-06-21 PROCEDURE — 99215 OFFICE O/P EST HI 40 MIN: CPT | Performed by: PSYCHIATRY & NEUROLOGY

## 2019-06-21 ASSESSMENT — PAIN SCALES - GENERAL: PAINLEVEL: NO PAIN (0)

## 2019-06-21 NOTE — PATIENT INSTRUCTIONS
1.  We will order an MRI of your brain and an EEG  2.  You need to report your spell to the Department of Motor Vehicle Services with the form given to you  3.  I will see you back after your testing      Patient Education     Having Electroencephalography (EEG)  An electroencephalogram (EEG) is a test to detect abnormalities in the electrical activity of the brain.  Brain cells (or neurons) communicate by producing electrical signals. These signals are measured by the EEG and any abnormalities are detected.  You will sit in a reclining chair or lie down on an exam table in a softly lighted room. The technologist will measure certain points on your head. He or she will use a special pencil to maya the spots where electrodes will be placed on your scalp. Your scalp will be rubbed with a mild abrasive, and electrodes (15 to 30) will be attached using tape or an adhesive.  This test is safe and painless. Avoid all foods containing caffeine for 8 hours before the test. You may also be instructed to avoid prescription medicines that affect the nervous system.  Most tests are done as an outpatient and may take up to an hour. Some tests are looking for existing seizure activity and you may need to stay overnight at the hospital and remain connected to the electrodes for a longer period of time.  Tips    Try to relax and remain still.    Avoid talking during the test.   During the test  You will be asked to do any of the following:    Relax, and open and close your eyes when the technician asks you to do so.    Breathe rapidly and deeply (hyperventilate) for a few minutes.    Sense a flashing light through your closed eyes.    Relax and go to sleep.    Respond to questions.  After your test  When your test is done, all of the electrodes will be removed. At home, wash your hair to remove any remaining glue, gel, or paste. You can get right back to your normal routine. Your doctor will let you know when your test results are  ready.  Date Last Reviewed: 12/1/2017 2000-2018 [employer]. [address] [place] [address], [place] [address]. All rights reserved. This information is not intended as a substitute for professional medical care. Always follow your healthcare professional's instructions.

## 2019-06-21 NOTE — LETTER
6/21/2019         RE: Marcus Daley  [address] [place] [address]        Dear Colleague,    Thank you for referring your patient, [facility] Daley[facility]. Please see a copy of my visit note below.    Movement Disorder Clinic follow up note    Patient: Marcus Daley  Medical Record Number: [id number]  Encounter Date: June 21, 2019  PCP:Murphy Jalloh    CC:  Recent spell of loss of awareness    Impression:  1.  Recent spell of loss of awareness-likely seizure  2.  Idiopathic Parkinson's disease well controlled  3.  REM behavior disorder    Recommendations:    Return to clinic:     Interval Hx:: Mr. Marcus Daley returns to clinic today for for a new problem.  2 days ago he awoke at 4 AM.  He felt a little odd but went back to sleep.  He did not have olfactory hallucinations.  His wife said that he began to thrash in his sleep.  He had done this before with his REM behavior disorder.  She tried to awaken him but he could not be alerted.  He then began to snort.  He was making these noises over and over.  There is no clear tonic-clonic movements or buccal oral automatisms.  However he flipped out of bed.  He was bleeding from the corner of his mouth.  He was not incontinent.  He was confused.  His eyes were closed.  She called the ambulance.  When they came he was combative.  It took 2 people to hold him down.  He does not remember this.  He was combative all the way to the hospital.  He was given Versed.  He was taken to [facility] and I reviewed the records.  He had a low-grade at that point and his neurological exam was normal.  Blood work was normal.  He had mild kidney insufficiency.  However his sodium and glucose were normal.  A CT of the head was done and is reported normal.  He said he was very sore all over.  When he went home he felt that his left leg was heavy.  However there is no clear focal neurological problem.  He is felt  normal since.    Current medications    Movement Disorder-related Medications                   AM noon PM     Carbidopa/levodopa 25/100                                                1 1 1                                                                                 Current Outpatient Medications   Medication     carbidopa-levodopa (SINEMET)  MG per tablet     metoprolol succinate (TOPROL-XL) 100 MG 24 hr tablet     No current facility-administered medications for this visit.        Examination:  /81   Pulse 79   Wt 94.5 kg (208 lb 6.4 oz)   SpO2 96%   BMI 28.26 kg/m     General- no distress, no rash, good pulses, no edema  Respiratory-auscultation over the anterior lung fields is clear  Cardiac- regular rate and rhythm    Neurologic  Mental status  Patient is alert, appropriate, speech is fluent and comprehension is intact    Cranial nerve testing  Pupils are equal and reactive to light, visual fields are full to confrontation bilaterally  Extraocular movements are full  Facial sensation is intact, face is symmetric with rest and activation  Palate rises symmetrically, tongue protrudes at midline with normal movements  Sterocleidomastoid and trapezius strength is normal and symmetric    Motor  Bulk.  Rigidity 1+/1+  All extremities.  Strength is 5/5 shoulder abduction, finger abduction, arm flexion and extension, hip flexion, plantar and dorsiflexion    Sensation  Intact to pin, vibration   Proprioception intact in great toes and fingers    Tendon reflexes  Testing at brachioradialis, biceps, triceps, patella and achilles bilaterally showed normal reflexes, symmetrically, without Babinski or Chew signs    Coordination  Finger nose finger testing: normalRapid alternating movements are normal.  Gait and Station: normal    45 minutes of total time was spent face-to-face with the patient over 50% of which was counseling..      Again, thank you for allowing me to participate in the care of your  patient.        Sincerely,        Abelardo Rodriguez MD

## 2019-06-21 NOTE — NURSING NOTE
Marcus Daley's goals for this visit include: return  He requests these members of his care team be copied on today's visit information:     PCP: Murphy Jalloh    Referring Provider:  No referring provider defined for this encounter.    /81   Pulse 79   Wt 94.5 kg (208 lb 6.4 oz)   SpO2 96%   BMI 28.26 kg/m      Do you need any medication refills at today's visit? n

## 2019-06-21 NOTE — PROGRESS NOTES
Movement Disorder Clinic follow up note    Patient: Marcus Daley  Medical Record Number: [id number]  Encounter Date: June 21, 2019  PCP:Murphy Jalloh    CC:  Recent spell of loss of awareness    Impression:  1.  Recent spell of loss of awareness-likely seizure  2.  Idiopathic Parkinson's disease well controlled  3.  REM behavior disorder    Recommendations:  1.  MRI of brain  2. EEG  3.  See back after above.    Return to clinic: After testing    Interval Hx:: Mr. Marcus Daley returns to clinic today for for a new problem.  2 days ago he awoke at 4 AM.  He felt a little odd but went back to sleep.  He did not have olfactory hallucinations.  His wife said that he began to thrash in his sleep.  He had done this before with his REM behavior disorder.  She tried to awaken him but he could not be alerted.  He then began to snort.  He was making these noises over and over.  There is no clear tonic-clonic movements or buccal oral automatisms.  However he flipped out of bed.  He was bleeding from the corner of his mouth.  He was not incontinent.  He was confused.  His eyes were closed.  She called the ambulance.  When they came he was combative.  It took 2 people to hold him down.  He does not remember this.  He was combative all the way to the hospital.  He was given Versed.  He was taken to [facility] and I reviewed the records.  He had a low-grade at that point and his neurological exam was normal.  Blood work was normal.  He had mild kidney insufficiency.  However his sodium and glucose were normal.  A CT of the head was done and is reported normal.  He said he was very sore all over.  When he went home he felt that his left leg was heavy.  However there is no clear focal neurological problem.  He is felt normal since.    Current medications    Movement Disorder-related Medications                   AM noon PM     Carbidopa/levodopa 25/100                                                1 1  1                                                                                 Current Outpatient Medications   Medication     carbidopa-levodopa (SINEMET)  MG per tablet     metoprolol succinate (TOPROL-XL) 100 MG 24 hr tablet     No current facility-administered medications for this visit.        Examination:  /81   Pulse 79   Wt 94.5 kg (208 lb 6.4 oz)   SpO2 96%   BMI 28.26 kg/m    General- no distress, no rash, good pulses, no edema  Respiratory-auscultation over the anterior lung fields is clear  Cardiac- regular rate and rhythm    Neurologic  Mental status  Patient is alert, appropriate, speech is fluent and comprehension is intact    Cranial nerve testing  Pupils are equal and reactive to light, visual fields are full to confrontation bilaterally  Extraocular movements are full  Facial sensation is intact, face is symmetric with rest and activation  Palate rises symmetrically, tongue protrudes at midline with normal movements  Sterocleidomastoid and trapezius strength is normal and symmetric    Motor  Bulk.  Rigidity 1+/1+  All extremities.  Strength is 5/5 shoulder abduction, finger abduction, arm flexion and extension, hip flexion, plantar and dorsiflexion    Sensation  Intact to pin, vibration   Proprioception intact in great toes and fingers    Tendon reflexes  Testing at brachioradialis, biceps, triceps, patella and achilles bilaterally showed normal reflexes, symmetrically, without Babinski or Chew signs    Coordination  Finger nose finger testing: normalRapid alternating movements are normal.  Gait and Station: normal    45 minutes of total time was spent face-to-face with the patient over 50% of which was counseling..

## 2019-06-26 ENCOUNTER — ANCILLARY PROCEDURE (OUTPATIENT)
Dept: MRI IMAGING | Facility: CLINIC | Age: 59
End: 2019-06-26
Attending: PSYCHIATRY & NEUROLOGY
Payer: COMMERCIAL

## 2019-06-26 DIAGNOSIS — R40.4 SPELL OF ALTERED CONSCIOUSNESS: ICD-10-CM

## 2019-06-26 PROCEDURE — 70551 MRI BRAIN STEM W/O DYE: CPT | Performed by: RADIOLOGY

## 2019-06-27 ENCOUNTER — ALLIED HEALTH/NURSE VISIT (OUTPATIENT)
Dept: NEUROLOGY | Facility: CLINIC | Age: 59
End: 2019-06-27
Attending: PSYCHIATRY & NEUROLOGY
Payer: COMMERCIAL

## 2019-06-27 DIAGNOSIS — R40.4 SPELL OF ALTERED CONSCIOUSNESS: ICD-10-CM

## 2019-06-27 NOTE — PROGRESS NOTES
CPT: 48554 mod 52  [facility] - [facility] clinic/OP/3 hour Video EEG  Reading MD: Jeferson

## 2019-06-27 NOTE — Clinical Note
OP 3 hr vEEG will be ready to be read after 1600 on 6/27; ordered by Dr Rodriguez - pt has f/u to go over results on 7/12.

## 2019-07-05 NOTE — PROCEDURES
Procedure Date: 2019      EEG #[id number]       DATE OF RECORDING/SERVICE DATE:  2019       SOURCE FILE DURATION:   3 hours       CLINICAL HISTORY:  This patient is a 59-year-old male who presented with recent spells of altered mental status and seizure-like activities.  EEG was requested for further evaluation for seizures.      CURRENT MEDICATIONS:  Carbidopa/levodopa.      TECHNICAL SUMMARY: This continuous video- EEG monitoring procedure was performed with 23 scalp electrodes in 10-20 electrode system placements, and additional scalp, precordial and other surface electrodes used for electrical referencing and artifact detection.  Video monitoring was utilized and periodically reviewed by EEG technologists and the physician for electroclinical correlations.    RESULTS:   BACKGROUND ACTIVITIES: During maximal wakefulness, there is a symmetric, moderate amplitude, well formed, approximately 10 Hz posterior dominant rhythm, which attenuated with eye opening. Stage I and II sleep were recorded with well formed sleep architectures including vertex sharp transients and sleep spindles. Hyperventilation was not performed.  Photic stimulation produced no driving responses.  INTERICTAL ACTIVITIES: There are no focal pathological slowing or epileptiform abnormalities.   ICTAL ACTIVITIES: There are no clinical or electrographic seizures during this recording.  IMPRESSION:  This is a normal waking and sleep EEG.          HORACIO ANN MD             D: 2019   T: 2019   MT: CAROLE      Name:     KIA VILLAGRAN   MRN:      [id number]        Account:        [id number]   :      1960           Procedure Date: 2019      Document: [id number]

## 2019-07-12 ENCOUNTER — OFFICE VISIT (OUTPATIENT)
Dept: NEUROLOGY | Facility: CLINIC | Age: 59
End: 2019-07-12
Payer: COMMERCIAL

## 2019-07-12 VITALS
WEIGHT: 208.5 LBS | DIASTOLIC BLOOD PRESSURE: 82 MMHG | BODY MASS INDEX: 28.24 KG/M2 | OXYGEN SATURATION: 97 % | HEART RATE: 76 BPM | HEIGHT: 72 IN | SYSTOLIC BLOOD PRESSURE: 138 MMHG

## 2019-07-12 DIAGNOSIS — G40.309 NONINTRACTABLE GENERALIZED IDIOPATHIC EPILEPSY WITHOUT STATUS EPILEPTICUS (H): ICD-10-CM

## 2019-07-12 DIAGNOSIS — G20.A1 PARKINSON'S DISEASE (H): Primary | ICD-10-CM

## 2019-07-12 PROCEDURE — 99214 OFFICE O/P EST MOD 30 MIN: CPT | Performed by: PSYCHIATRY & NEUROLOGY

## 2019-07-12 ASSESSMENT — MIFFLIN-ST. JEOR: SCORE: 1788.75

## 2019-07-12 ASSESSMENT — PAIN SCALES - GENERAL: PAINLEVEL: NO PAIN (0)

## 2019-07-12 NOTE — LETTER
7/12/2019         RE: Marcus Daley  [id number] [place] [address]        Dear Colleague,    Thank you for referring your patient, Marcus Daley, to the [facility]. Please see a copy of my visit note below.    Movement Disorder Clinic follow up note    Patient: Marcus Daley  Medical Record Number: [id number]  Encounter Date: July 12, 2019  PCP:Murphy Jalloh    CC: First seizure    Impression:  1.  First unprovoked seizure  2.  Mild idiopathic Parkinson's disease    The patient has normal studies.  He has MRI and EEG are normal.  I estimated the risk of recurrent seizure for him at 30%.  The current feeling in epilepsy is to withhold treatment after a first unprovoked seizure.  This is what I have advised for Mr. her Hooks.  He will practice good sleep hygiene.  He will moderate intake of alcohol to 1-2 drinks a day.    If he has no further spells by September 16 of 2019 we can certify him to drive again.    Recommendations:  1.  The patient will call back in September of this year and if he has had no further spells I can certify him to drive.  2.  He should follow-up for his Parkinson's disease in 6 months to a year.  3.  Melatonin 4 mg at bedtime increasing up to is just 12 mg at bedtime for probable dream enactment  4.  He would like to try taking an extra carbidopa levodopa at bedtime and that is certainly satisfactory.    Return to clinic: 6 to 12 months  Interval Hx:: Mr. Marcus Daley returns to clinic today for follow-up of of his first unprovoked spell.  This spell had many features suggestive of seizure.  An MRI of the scan of his brain was done and I reviewed this with him.  It is entirely normal.  The hippocampal formations look normal.  His EEG was done and was entirely normal.  The current recommendations for first unprovoked seizure would be to follow without medication.  This is his preference.  He realizes that he still should not drive for  3 months.  On September 16 of this year we could certify him to drive if he has had no further spells.    He awakens at night moving his hands and being restless.  I suspect this is dream enactment.  I recommended melatonin 4 mg at bedtime and increasing towards 12 mg at bedtime.  I also recommend that he restrict his alcohol intake to 1-2 drinks per day.    The patient understands recommendations and will comply.  He will continue to exercise regularly.    Current medications    Movement Disorder-related Medications                    a.m.  noon  evening     Carbidopa/levodopa 25/100                                                 1  1  1                                                                                 Current Outpatient Medications   Medication     carbidopa-levodopa (SINEMET)  MG per tablet     metoprolol succinate (TOPROL-XL) 100 MG 24 hr tablet     No current facility-administered medications for this visit.        Examination:  /82   Pulse 76   Ht 1.829 m (6')   Wt 94.6 kg (208 lb 8 oz)   SpO2 97%   BMI 28.28 kg/m     General- no distress, no rash, good pulses, no edema  Respiratory-auscultation over the anterior lung fields is clear  Cardiac- regular rate and rhythm    Neurologic  Mental status  Patient is alert, appropriate, speech is fluent and comprehension is intact    Cranial nerve testing  Pupils are equal and reactive to light, visual fields are full to confrontation bilaterally  Extraocular movements are full  Facial sensation is intact, face is symmetric with rest and activation  Palate rises symmetrically, tongue protrudes at midline with normal movements  Sterocleidomastoid and trapezius strength is normal and symmetric    Motor  Bulk and tone are normal  Strength is 5/5 shoulder abduction, finger abduction, arm flexion and extension, hip flexion, plantar and dorsiflexion    Sensation  Intact to pin, vibration   Proprioception intact in great toes and  fingers    Tendon reflexes  Testing at brachioradialis, biceps, triceps, patella and achilles bilaterally showed normal reflexes, symmetrically, without Babinski or Chew signs    Coordination  Finger nose finger testing: normalRapid alternating movements are normal.  Gait and Station: normal    25 minutes of total time was spent face-to-face with the patient over 50% of which was counseling..      Again, thank you for allowing me to participate in the care of your patient.        Sincerely,        Abelardo Rodriguez MD

## 2019-07-12 NOTE — PROGRESS NOTES
Movement Disorder Clinic follow up note    Patient: Marcus Daley  Medical Record Number: [id number]  Encounter Date: July 12, 2019  PCP:Murphy Jalloh    CC: First seizure    Impression:  1.  First unprovoked seizure  2.  Mild idiopathic Parkinson's disease    The patient has normal studies.  He has MRI and EEG are normal.  I estimated the risk of recurrent seizure for him at 30%.  The current feeling in epilepsy is to withhold treatment after a first unprovoked seizure.  This is what I have advised for Mr. her Hooks.  He will practice good sleep hygiene.  He will moderate intake of alcohol to 1-2 drinks a day.    If he has no further spells by September 16 of 2019 we can certify him to drive again.    Recommendations:  1.  The patient will call back in September of this year and if he has had no further spells I can certify him to drive.  2.  He should follow-up for his Parkinson's disease in 6 months to a year.  3.  Melatonin 4 mg at bedtime increasing up to is just 12 mg at bedtime for probable dream enactment  4.  He would like to try taking an extra carbidopa levodopa at bedtime and that is certainly satisfactory.    Return to clinic: 6 to 12 months  Interval Hx:: Mr. Marcus Daley returns to clinic today for follow-up of of his first unprovoked spell.  This spell had many features suggestive of seizure.  An MRI of the scan of his brain was done and I reviewed this with him.  It is entirely normal.  The hippocampal formations look normal.  His EEG was done and was entirely normal.  The current recommendations for first unprovoked seizure would be to follow without medication.  This is his preference.  He realizes that he still should not drive for 3 months.  On September 16 of this year we could certify him to drive if he has had no further spells.    He awakens at night moving his hands and being restless.  I suspect this is dream enactment.  I recommended melatonin 4 mg at bedtime and  increasing towards 12 mg at bedtime.  I also recommend that he restrict his alcohol intake to 1-2 drinks per day.    The patient understands recommendations and will comply.  He will continue to exercise regularly.    Current medications    Movement Disorder-related Medications                    a.m.  noon  evening     Carbidopa/levodopa 25/100                                                 1  1  1                                                                                 Current Outpatient Medications   Medication     carbidopa-levodopa (SINEMET)  MG per tablet     metoprolol succinate (TOPROL-XL) 100 MG 24 hr tablet     No current facility-administered medications for this visit.        Examination:  /82   Pulse 76   Ht 1.829 m (6')   Wt 94.6 kg (208 lb 8 oz)   SpO2 97%   BMI 28.28 kg/m    General- no distress, no rash, good pulses, no edema  Respiratory-auscultation over the anterior lung fields is clear  Cardiac- regular rate and rhythm    Neurologic  Mental status  Patient is alert, appropriate, speech is fluent and comprehension is intact    Cranial nerve testing  Pupils are equal and reactive to light, visual fields are full to confrontation bilaterally  Extraocular movements are full  Facial sensation is intact, face is symmetric with rest and activation  Palate rises symmetrically, tongue protrudes at midline with normal movements  Sterocleidomastoid and trapezius strength is normal and symmetric    Motor  Bulk and tone are normal  Strength is 5/5 shoulder abduction, finger abduction, arm flexion and extension, hip flexion, plantar and dorsiflexion    Sensation  Intact to pin, vibration   Proprioception intact in great toes and fingers    Tendon reflexes  Testing at brachioradialis, biceps, triceps, patella and achilles bilaterally showed normal reflexes, symmetrically, without Babinski or Chew signs    Coordination  Finger nose finger testing: normalRapid alternating movements  are normal.  Gait and Station: normal    25 minutes of total time was spent face-to-face with the patient over 50% of which was counseling..

## 2019-07-12 NOTE — NURSING NOTE
Marcus Daley's goals for this visit include: return  He requests these members of his care team be copied on today's visit information:     PCP: Murphy Jalloh    Referring Provider:  No referring provider defined for this encounter.    /82   Pulse 76   Ht 1.829 m (6')   Wt 94.6 kg (208 lb 8 oz)   SpO2 97%   BMI 28.28 kg/m      Do you need any medication refills at today's visit? n

## 2019-07-12 NOTE — PATIENT INSTRUCTIONS
1.  Try Melatonin 4 mg at bedtime.  May take up to 12 mg at bedtime  2.  Continue carbidopa/levodopa 25/100 one three times a day.  May take an additional tablet at bedtime.

## 2019-09-11 ENCOUNTER — TELEPHONE (OUTPATIENT)
Dept: NEUROLOGY | Facility: CLINIC | Age: 59
End: 2019-09-11

## 2019-09-11 NOTE — TELEPHONE ENCOUNTER
[facility] Call Center    Phone Message    May a detailed message be left on voicemail: yes    Reason for Call: Form or Letter   Patient was suspended from driving for 90 days due to a fall. Almost 90 days so wants to obtain a letter clearing him to drive. Please call patient to address the process. Pt was informed isaura now sees only at the unversity      Action Taken: Message routed to:  Adult Clinics: Neurology p [id number]

## 2019-09-12 NOTE — TELEPHONE ENCOUNTER
Received forms from [place] [employer],provider signed form and form was mailed to patient home, sent to be scanned

## 2019-10-03 ENCOUNTER — TELEPHONE (OUTPATIENT)
Dept: NEUROLOGY | Facility: CLINIC | Age: 59
End: 2019-10-03

## 2019-10-03 NOTE — TELEPHONE ENCOUNTER
Hi    It does not appear so.    Sincerely  Porter Garcia MD, MD [facility]  Staff Neurologist   10/03/19

## 2019-10-03 NOTE — TELEPHONE ENCOUNTER
"Dr Rodriguez, can you advise on the pt's question below? I will call him and let him know your thoughts.    \"Patient request a call back to discuss if taking Dramamine would interfere with his current medications. Please call back to advise.\"    Called the pt and left a detailed message letting him know that we are checking with the provider.  Rachel Roche, RNCC  Neurology     "

## 2019-10-03 NOTE — TELEPHONE ENCOUNTER
[facility] Call Center    Phone Message    May a detailed message be left on voicemail: yes    Reason for Call:  Patient request a call back to discuss if taking Dramamine would interfere with his current medications. Please call back to advise.    Action Taken: Message routed to:  Adult Clinics: Neurology p [id number]

## 2019-10-08 NOTE — TELEPHONE ENCOUNTER
Patient stated he has not heard if it is ok to take Dramamine with his current medication. Ok to leave a detailed message.  Please advise.  Thank you.

## 2019-10-08 NOTE — TELEPHONE ENCOUNTER
Left message with Suzan's information. I did ask pt to call me back in clinic to confirm he got the message and also to confirm his medication list. Silvana Newton RN

## 2019-10-08 NOTE — TELEPHONE ENCOUNTER
Left VM for pt stating that [employer] database shows no interactions with meds and dramamine. I will check with our pharmD Suzan as well. I told pt that Dr. Rodriguez is out and will take a look at this when he returns. Silvana Newton RN

## 2019-10-08 NOTE — TELEPHONE ENCOUNTER
There are no interactions with Dramamine and the patient's current medications (list shows carbidopa-levodopa and metoprolol). He should monitor for increased drowsiness and confusion when he takes Dramamine, and ideally he should avoid alcohol use when taking Dramamine.     Suzan Carson, Pharm.D.  Medication Therapy Management Pharmacist  Phone: [phone]

## 2019-10-11 RX ORDER — DIMENHYDRINATE 50 MG
50 TABLET ORAL
COMMUNITY
End: 2020-09-09

## 2019-10-11 NOTE — TELEPHONE ENCOUNTER
Abelardo Rodriguez MD  You; Rachel Roche RN Yesterday (7:17 AM)      Agree Dramamine should be fine!     Thanks all!     Noe

## 2019-10-11 NOTE — TELEPHONE ENCOUNTER
Spoke with patient to communicate message, he confirmed medication list. Med list updated. Silvana Newton RN

## 2019-10-16 ENCOUNTER — TELEPHONE (OUTPATIENT)
Dept: NEUROLOGY | Facility: CLINIC | Age: 59
End: 2019-10-16

## 2019-10-16 NOTE — TELEPHONE ENCOUNTER
2nd attempt to reach the patient and schedule a follow up appointment with either Dr. Rodriguez at the [facility] or Dr. Kathleen here at ; [facility].    Writer spoke with the patient in Aug, he wasn't sure if he was going to continue seeing Providers due to insurance.    Harmony Peters  [facility]  Adult Med Spec/Surg Spec   [phone]

## 2019-11-01 ENCOUNTER — TELEPHONE (OUTPATIENT)
Dept: NEUROLOGY | Facility: CLINIC | Age: 59
End: 2019-11-01

## 2019-11-01 DIAGNOSIS — G40.909 SEIZURE DISORDER (H): Primary | ICD-10-CM

## 2019-11-01 RX ORDER — LEVETIRACETAM 500 MG/1
TABLET ORAL
Qty: 100 TABLET | Refills: 3 | Status: SHIPPED | OUTPATIENT
Start: 2019-11-01 | End: 2020-01-29

## 2019-11-01 NOTE — TELEPHONE ENCOUNTER
Spoke with pt's wife Ronel, she is very concerned and said Dr. Rodriguez had told them if Marcus had another seizure they were to call right away and be seen to initiate antiepileptic medication. She is hoping to be seen today. We discussed provider availability and that I would send a message to Dr. Rodriguez to start. Silvana Newton RN

## 2019-11-01 NOTE — TELEPHONE ENCOUNTER
[facility] Call Center    Phone Message    May a detailed message be left on voicemail: no    Reason for Call: Symptoms or Concerns     If patient has red-flag symptoms, warm transfer to triage line    Current symptom or concern: Seizure this morning.  Pts wife state they need to get in asap. They know Dr Rodriguez has left. Paramedics came and pt is at home.  They were to call office as soon as it opens for an appt.  Nothing available today.  Message sent.     Symptoms have been present for: a few hour(s)

## 2019-11-01 NOTE — TELEPHONE ENCOUNTER
Patient's wife called in to clinic with report of second seizure.  Like first sz it was out of sleep. Before seizure he was awake and stated that he had a funny taste in his mouth.  Seizure first presented with shaking and snorting.  He bite his tongue and it bleed, was Not incontinent, Wife feels it lasted longer than 5 min. But was not as long as the first seizure.  Postictally he was confused for 20 minutes.  Reports good sleep very modest use of caffeine and alcohol.  Denies any recent illness, fever, nausea, diarrhea, flu/cold symptoms.  Ambulance was called and a blood sugar was checked, it was normal.    Advised Wife that if seizure occurs again that she should get Marcus to ER and have him loaded with a ASD.  Indicated to her that We would do are best to reach Dr. Rodriguez or one of the Epilepsy providers and call back again.

## 2019-11-01 NOTE — TELEPHONE ENCOUNTER
AM I IN LABOR    What is labor?  Labor is the work that your body does to birth your baby.  Your uterus (the womb) contracts.  Your cervix (the mouth of the uterus) opens.  You will push your baby out into the world.    What do contractions (labor pains) feel like?  When they first start, contractions usually feel like cramps during your period.  Sometimes you feel pain in your back.  Most often, contractions fell like muscles pulling painfully in your lower belly.  At first, the contractions will probably be 15 to 20 minutes apart.  They will not feel too painful.  As labor goes on, the contractions get stronger, closer together, and more painful.    How do I time the contractions?  Time your contractions by counting the number of minutes from the start of one contraction to the start of the next contraction.    What should I do when the contractions start?  If it is night and you can sleep, sleep.  If it happens during the day, here are some things you can do to take care of yourself at home.   · Walk.  If the pains you are having are real labor, walking will make the contractions come faster and harder.  If the contractions are not going to continue and be real labor, walking will make the contractions slow down.  · Take a shower or bath.  This will help you relax.  · Eat. Labor is a big event.  It takes a lot of energy.  · Drink water.  Not drinking enough water can cause false labor (contractions that hurt but do not open your cervix.)  If this is true labor, drinking water will help you have strength to get through your labor.  · Take a nap.  Get all the rest you can.  · Get a massage.  If you labor is in your back, a strong massage on your lower back may feel very good.  Getting a foot massage is always good.  · Don't panic.  You can do this.  Your body was made for this.  You are strong!    When should I go to the hospital or call my healthcare provider?  · Your contractions have been 5 minutes apart or less  Hever, Sreedhar Zhou MD sent to Humaira Quinones RN   Caller: Unspecified (Today,  7:10 AM)             Probably would have him come to the ED and revaluated and possibly loaded on medication.   Would have him see epilepsy doctors      Called wife back to discuss Dr. Kathleen's recommendation. Patient does not want to go to the emergency department since he is no longer actively seizing. She asked me to check with Dr. Rodriguez to see if he'd be willing to prescribe something.    He had a seizure back on June 19th and again this morning.    From Dr. Rodriguez's last office visit note:    He has MRI and EEG are normal.  I estimated the risk of recurrent seizure for him at 30%.  The current feeling in epilepsy is to withhold treatment after a first unprovoked seizure.  This is what I have advised for Mr. her Hooks.  He will practice good sleep hygiene.  He will moderate intake of alcohol to 1-2 drinks a day.     If he has no further spells by September 16 of 2019 we can certify him to drive again.        for at least 1 hour.  · If several contractions are so painful you cannot walk or talk during one.  · Your bag of crook breaks.  (You may have a big gush of water or just water that runs down your legs when you walk.)    Are there other reasons to call my healthcare provider?  Yes, you should call your healthcare provider or go to the hospital if you start to bleed like you are having a period - blood that soaks your underwear or runs down your legs, if you have sudden pain, if your baby has not moved for several hours, or if you are leaking green fluid.  The rule is as follows: if you are very concerned about something, call.    Labor?  WHAT DO I DO NOW  First...If your baby is due more than 3 weeks from today and you are having back pain or stomach cramp, or there is fluid leaking from your vagina, or your baby has not moved for several hours, or you have other troubling symptoms, call your healthcare provider now!    Of...If you are overdue, be sure to see your healthcare provider at least once a week and talk with him/her about a plan for your care.    Now...If your baby is due within the next 3 weeks, follow this decision path:    Labor?  What do I do now?    Are you having:  Painful contactions that have been less than  5 minutes apart for one hour or more?    or  Green fluid leaking from your vagina?  Yes Go to the hospital now                          or  Heavy bleeding (blood that runs down your  Legs or soaks your panties?               -  No -    Are you leaking clear fluid from your vagina? Yes Call your healthcare provider          for instructions      -  No -          You may be in early labor.          - walk  Are you having contractions that are more  - rest  than 5 minutes apart?    Yes - eat lightly          - drink lots of water          - breathe!!    -  No -    You are not in labor.  Be patient.

## 2019-11-01 NOTE — TELEPHONE ENCOUNTER
Patient had another seizure.  Awoke last night with a funny taste in his mouth.  Then went back to sleep.  Wife awoke to find him in generalized convulsion.  Bit lip and tongue.  No incontinence.  Feels well now.    Will start levitiracetam 500 mg twice a day increasing to 1000 mg twice a day after two weeks.    Will arrange epilepsy consult.  Aura suggests temporal lobe onset.    Instructed not to drive.    Will see back after consult.    Abelardo Rodriguez MD

## 2019-11-04 NOTE — TELEPHONE ENCOUNTER
[facility] Call Center    Phone Message    May a detailed message be left on voicemail: yes    Reason for Call: Other: Patient called and would like a call back to discuss what the next steps should be. Please call to advise.      Action Taken: Message routed to:  Adult Clinics: Neurology p [id number]

## 2019-11-05 ENCOUNTER — HEALTH MAINTENANCE LETTER (OUTPATIENT)
Age: 59
End: 2019-11-05

## 2019-11-05 NOTE — TELEPHONE ENCOUNTER
Spoke to patient and let him know that the next step is for scheduling to call him and set up a new patient consult with one of our Epilepsy specialists. He is currently taking the Keppra 500 mg two times per day.

## 2019-11-06 ENCOUNTER — TELEPHONE (OUTPATIENT)
Dept: NEUROLOGY | Facility: CLINIC | Age: 59
End: 2019-11-06

## 2019-11-06 NOTE — TELEPHONE ENCOUNTER
[facility] Call Center    Phone Message    May a detailed message be left on voicemail: yes    Reason for Call: Patient wife called and wanted update for the Epilepsy scheduling. Her and her  were not sure if they would get a call to schedule or if they needed to call to schedule appointment. Wife would like call back to get appointment for the epilepsy scheduled.    Action Taken: Message routed to:  Adult Clinics: Neurology p [id number]

## 2019-11-11 ENCOUNTER — OFFICE VISIT (OUTPATIENT)
Dept: NEUROLOGY | Facility: CLINIC | Age: 59
End: 2019-11-11
Attending: PSYCHIATRY & NEUROLOGY
Payer: COMMERCIAL

## 2019-11-11 VITALS
HEART RATE: 79 BPM | SYSTOLIC BLOOD PRESSURE: 165 MMHG | DIASTOLIC BLOOD PRESSURE: 94 MMHG | BODY MASS INDEX: 28.48 KG/M2 | WEIGHT: 210 LBS

## 2019-11-11 DIAGNOSIS — G40.909 SEIZURE DISORDER (H): ICD-10-CM
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
## 2019-11-21 ENCOUNTER — TELEPHONE (OUTPATIENT)
Dept: NEUROLOGY | Facility: CLINIC | Age: 59
End: 2019-11-21

## 2019-11-21 DIAGNOSIS — G20.A1 PARALYSIS AGITANS (H): ICD-10-CM

## 2019-11-21 RX ORDER — CARBIDOPA AND LEVODOPA 25; 100 MG/1; MG/1
1.5 TABLET ORAL 3 TIMES DAILY
Qty: 405 TABLET | Refills: 3 | Status: SHIPPED | OUTPATIENT
Start: 2019-11-21 | End: 2020-10-06

## 2019-11-21 NOTE — TELEPHONE ENCOUNTER
Returned the pt's call to let them know that Dr Cruz wouldn't be the one to prescribe the Carb/Levo. They do plan on staying with Dr Rodriguez and a follow up appt has been made. Will route to the movement disorder team at the [facility] to review. They will be going out of town this afternoon and does not have enough to last him through the weekend.   Rachel Roche, RNCC  Neurology

## 2019-11-21 NOTE — TELEPHONE ENCOUNTER
Health Call Center    Phone Message    May a detailed message be left on voicemail: yes    Reason for Call: Medication Refill Request    Has the patient contacted the pharmacy for the refill? Yes   Name of medication being requested: carbidopa-levodopa (SINEMET)  MG per tablet  Provider who prescribed the medication: Dr. Rodriguez/ Dr. Shahab Daly  [facility]  Date medication is needed: 11/21/2019 Patient stated he only has a couple days left and has called [facility] several times.        Action Taken: Message routed to:  Adult Clinics: Neurology p [phone]

## 2019-11-26 DIAGNOSIS — G40.909 SEIZURE DISORDER (H): ICD-10-CM

## 2019-11-26 PROCEDURE — 80177 DRUG SCRN QUAN LEVETIRACETAM: CPT | Mod: 90 | Performed by: PSYCHIATRY & NEUROLOGY

## 2019-11-26 PROCEDURE — 99000 SPECIMEN HANDLING OFFICE-LAB: CPT | Performed by: PSYCHIATRY & NEUROLOGY

## 2019-11-26 PROCEDURE — 36415 COLL VENOUS BLD VENIPUNCTURE: CPT | Performed by: PSYCHIATRY & NEUROLOGY

## 2019-11-28 LAB — LEVETIRACETAM SERPL-MCNC: 21 UG/ML (ref 12–46)

## 2019-12-16 ASSESSMENT — ENCOUNTER SYMPTOMS
SMELL DISTURBANCE: 0
DIZZINESS: 0
SINUS PAIN: 0
SINUS CONGESTION: 0
DECREASED CONCENTRATION: 0
NERVOUS/ANXIOUS: 0
INSOMNIA: 0
SPEECH CHANGE: 0
TROUBLE SWALLOWING: 0
HEADACHES: 0
HOARSE VOICE: 0
LOSS OF CONSCIOUSNESS: 0
TREMORS: 1
NECK MASS: 0
DEPRESSION: 1
TINGLING: 0
SORE THROAT: 0
PARALYSIS: 0
PANIC: 0
NUMBNESS: 0
DISTURBANCES IN COORDINATION: 0
WEAKNESS: 0
TASTE DISTURBANCE: 0
MEMORY LOSS: 0
SEIZURES: 1

## 2019-12-17 ENCOUNTER — OFFICE VISIT (OUTPATIENT)
Dept: NEUROLOGY | Facility: CLINIC | Age: 59
End: 2019-12-17
Payer: COMMERCIAL

## 2019-12-17 VITALS
DIASTOLIC BLOOD PRESSURE: 89 MMHG | BODY MASS INDEX: 28.48 KG/M2 | HEART RATE: 89 BPM | WEIGHT: 210 LBS | OXYGEN SATURATION: 97 % | SYSTOLIC BLOOD PRESSURE: 155 MMHG

## 2019-12-17 DIAGNOSIS — G20.A1 PARALYSIS AGITANS (H): ICD-10-CM

## 2019-12-17 RX ORDER — CARBIDOPA AND LEVODOPA 50; 200 MG/1; MG/1
1 TABLET, EXTENDED RELEASE ORAL AT BEDTIME
Qty: 90 TABLET | Refills: 3 | Status: SHIPPED | OUTPATIENT
Start: 2019-12-17 | End: 2021-04-20

## 2019-12-17 ASSESSMENT — PAIN SCALES - GENERAL: PAINLEVEL: NO PAIN (0)

## 2019-12-17 NOTE — LETTER
12/17/2019       RE: Marcus Daley  [address] [place] [address]     Dear Colleague,    Thank you for referring your patient, Marcus Daley, to the [facility] NEUROLOGY at [facility]. Please see a copy of my visit note below.    Movements Disorder Clinic Follow-up Note    CC: Parkinson's disease    Impression:  1.  Epilepsy  2.  Idiopathic Parkinson's disease    The patient has now had 2 unprovoked seizures with an overall unremarkable MRI and EEG.  The patient was seen and evaluated by Dr. Cruz.  No reported history of seizures since starting Keppra without notable side effects.  Patient is currently on 1000 mg twice daily.  The patient's last seizure was on 11/1/19 and no driving is recommended for 3 months since last seizure.  The patient's Parkinson's disease appears stable without motor fluctuation.  He is tolerating the Sinemet well and with good response.  The patient is having frequent leg movements during the night that improved with an additional dose of Sinemet at bedtime.  May be beneficial to start Sinemet CR at bedtime as these movements appear to respond to Sinemet and are not only disrupting the patient's sleep but also f the patient's significant others sleep.  The patient also would benefit from a sleep study given the history of snoring, frequent naps during the day, and episodes concerning for apnea.    Recommendations:  1. Continue Sinemet 25/100mg 1.5 tabs 3 times daily  2.  Continue Keppra 1000 mg twice daily  3.  Start Sinemet CR 50/200 nightly  4.  Sleep clinic referral  5.  No driving for 3 months (last seizure 11/1/2019)  6.  Follow-up in 6 months    IAbelardo MD, personally interviewed, examined and evaluated this patient on 12/17/2019.  I discussed the patient with Dr. Edmar Keane and agree with the assessment, examination and plan of care documented by Dr. Keane.  I personally reviewed the vital signs,  medications and labs/imaging.    The patient had a second nocturnal seizure.  He is now on levetiracetam  1000 milligrams twice a day and tolerating it.  He has had no further spells.  His Parkinson's is well controlled.  We will add a long-acting carbidopa levodopa at bedtime.  The patient declines the sleep evaluation.  We have a plan to authorize his driving when he has been seizure-free for 3 months.  He has been very compliant with medical recommendations.    Abelardo Rodriguez MD    Interval history:  The patient had additional event on 11/1 out of sleep concerning for seizure.  Patient was evaluated by Dr. Cruz in the epilepsy clinic.  The patient was started on Keppra 1000 mg twice daily.  No additional seizures since. Overall appears to be tolerating the medication.  The patient stated his symptoms from Parkinson's disease are overall stable and have improved from Sinemet.  He is currently taking Sinemet 25/100 mg 1.5 tabs 3 times a day at 7am, 12pm, and 5 PM.  The patient will occasionally take an additional dose at 10 PM due to frequent leg movements during the night.  The patient's wife stated that this has helped however the movements will again worsen in the early part of the morning.  Of note the patient's wife stated he snores, gasps for air during the night, and takes about 1-2 naps per day.    Current Outpatient Medications   Medication     carbidopa-levodopa (SINEMET CR)  MG CR tablet     carbidopa-levodopa (SINEMET)  MG tablet     levETIRAcetam (KEPPRA) 500 MG tablet     dimenhyDRINATE (DRAMAMINE) 50 MG tablet     No current facility-administered medications for this visit.      Examination:  BP: 155/89  Pulse: 89  Weight: 95.3 kg  SPO2: 97%  BMI: 28.48  General: Sitting upright in chair in no acute distress  Respiratory: No acute distress    Neurologic exam  Mental status: Alert, awake, oriented to person place and time.  Answering questions appropriately.  Cranial nerves: Pupils  equal round and reactive to light, Extraocular movements intact, visual fields full, symmetric facial movements, no dysarthria  Motor: Mild right upper extremity rigidity and bradykinesia appreciated.  No resting tremor.  Strength 5/5 throughout.  Sensation: Intact to light touch throughout  Reflexe: 2+ and symmetric throughout  Coordination: Finger-nose-finger intact without dysmetria  Gait: Normal pace with reduced arm swing on the right.  Negative retropulsion.    25 minutes of total time was spent face-to-face with the patient over 50% of which was counseling    Patient seen and discussed with staff Dr. Jennifer Keane MD  Neurology PGY4    Again, thank you for allowing me to participate in the care of your patient.      Sincerely,    Abelardo Rodriguez MD

## 2019-12-17 NOTE — NURSING NOTE
Chief Complaint   Patient presents with     RECHECK     [facility] RETURN MOVEMENT DISORDER       Suki Gee, EMT

## 2019-12-17 NOTE — PATIENT INSTRUCTIONS
1.  Continue carbidopa/levodopa 25/100 1.5 three times a day  2.  Continue keppra 500 mg, 2 tabs twice a day  3.  Start carbidopa/levodopa ER 50/200 at bedtime  4.  Call for seizure form at 3 months from last event

## 2019-12-17 NOTE — PROGRESS NOTES
Movements Disorder Clinic Follow-up Note    CC: Parkinson's disease    Impression:  1.  Epilepsy  2.  Idiopathic Parkinson's disease    The patient has now had 2 unprovoked seizures with an overall unremarkable MRI and EEG.  The patient was seen and evaluated by Dr. Cruz.  No reported history of seizures since starting Keppra without notable side effects.  Patient is currently on 1000 mg twice daily.  The patient's last seizure was on 11/1/19 and no driving is recommended for 3 months since last seizure.  The patient's Parkinson's disease appears stable without motor fluctuation.  He is tolerating the Sinemet well and with good response.  The patient is having frequent leg movements during the night that improved with an additional dose of Sinemet at bedtime.  May be beneficial to start Sinemet CR at bedtime as these movements appear to respond to Sinemet and are not only disrupting the patient's sleep but also f the patient's significant others sleep.  The patient also would benefit from a sleep study given the history of snoring, frequent naps during the day, and episodes concerning for apnea.    Recommendations:  1. Continue Sinemet 25/100mg 1.5 tabs 3 times daily  2.  Continue Keppra 1000 mg twice daily  3.  Start Sinemet CR 50/200 nightly  4.  Sleep clinic referral  5.  No driving for 3 months (last seizure 11/1/2019)  6.  Follow-up in 6 months    IAbelardo MD, personally interviewed, examined and evaluated this patient on 12/17/2019.  I discussed the patient with Dr. Edmar Keane and agree with the assessment, examination and plan of care documented by Dr. Keane.  I personally reviewed the vital signs, medications and labs/imaging.    The patient had a second nocturnal seizure.  He is now on levetiracetam  1000 milligrams twice a day and tolerating it.  He has had no further spells.  His Parkinson's is well controlled.  We will add a long-acting carbidopa levodopa at bedtime.  The patient  declines the sleep evaluation.  We have a plan to authorize his driving when he has been seizure-free for 3 months.  He has been very compliant with medical recommendations.    Abelardo Rodriguez MD    Interval history:  The patient had additional event on 11/1 out of sleep concerning for seizure.  Patient was evaluated by Dr. Cruz in the epilepsy clinic.  The patient was started on Keppra 1000 mg twice daily.  No additional seizures since. Overall appears to be tolerating the medication.  The patient stated his symptoms from Parkinson's disease are overall stable and have improved from Sinemet.  He is currently taking Sinemet 25/100 mg 1.5 tabs 3 times a day at 7am, 12pm, and 5 PM.  The patient will occasionally take an additional dose at 10 PM due to frequent leg movements during the night.  The patient's wife stated that this has helped however the movements will again worsen in the early part of the morning.  Of note the patient's wife stated he snores, gasps for air during the night, and takes about 1-2 naps per day.    Current Outpatient Medications   Medication     carbidopa-levodopa (SINEMET CR)  MG CR tablet     carbidopa-levodopa (SINEMET)  MG tablet     levETIRAcetam (KEPPRA) 500 MG tablet     dimenhyDRINATE (DRAMAMINE) 50 MG tablet     No current facility-administered medications for this visit.      Examination:  BP: 155/89  Pulse: 89  Weight: 95.3 kg  SPO2: 97%  BMI: 28.48  General: Sitting upright in chair in no acute distress  Respiratory: No acute distress    Neurologic exam  Mental status: Alert, awake, oriented to person place and time.  Answering questions appropriately.  Cranial nerves: Pupils equal round and reactive to light, Extraocular movements intact, visual fields full, symmetric facial movements, no dysarthria  Motor: Mild right upper extremity rigidity and bradykinesia appreciated.  No resting tremor.  Strength 5/5 throughout.  Sensation: Intact to light touch  throughout  Reflexe: 2+ and symmetric throughout  Coordination: Finger-nose-finger intact without dysmetria  Gait: Normal pace with reduced arm swing on the right.  Negative retropulsion.    25 minutes of total time was spent face-to-face with the patient over 50% of which was counseling    Patient seen and discussed with staff Dr. Jennifer Keane MD  Neurology PGY4    Answers for HPI/ROS submitted by the patient on 12/16/2019   General Symptoms: No  Skin Symptoms: No  HENT Symptoms: Yes  EYE SYMPTOMS: No  HEART SYMPTOMS: No  LUNG SYMPTOMS: No  INTESTINAL SYMPTOMS: No  URINARY SYMPTOMS: No  REPRODUCTIVE SYMPTOMS: No  SKELETAL SYMPTOMS: No  BLOOD SYMPTOMS: No  NERVOUS SYSTEM SYMPTOMS: Yes  MENTAL HEALTH SYMPTOMS: Yes  Ear pain: No  Ear discharge: No  Hearing loss: No  Tinnitus: Yes  Nosebleeds: No  Congestion: No  Sinus pain: No  Trouble swallowing: No   Voice hoarseness: No  Mouth sores: No  Sore throat: No  Tooth pain: No  Gum tenderness: No  Bleeding gums: No  Change in taste: No  Change in sense of smell: No  Dry mouth: No  Hearing aid used: No  Neck lump: No  Trouble with coordination: No  Dizziness or trouble with balance: No  Fainting or black-out spells: No  Memory loss: No  Headache: No  Seizures: Yes  Speech problems: No  Tingling: No  Tremor: Yes  Weakness: No  Difficulty walking: No  Paralysis: No  Numbness: No  Nervous or Anxious: No  Depression: Yes  Trouble sleeping: No  Trouble thinking or concentrating: No  Mood changes: No  Panic attacks: No

## 2020-01-29 ENCOUNTER — MYC REFILL (OUTPATIENT)
Dept: NEUROLOGY | Facility: CLINIC | Age: 60
End: 2020-01-29

## 2020-01-29 ENCOUNTER — TELEPHONE (OUTPATIENT)
Dept: NEUROLOGY | Facility: CLINIC | Age: 60
End: 2020-01-29

## 2020-01-29 ENCOUNTER — MYC MEDICAL ADVICE (OUTPATIENT)
Dept: NEUROLOGY | Facility: CLINIC | Age: 60
End: 2020-01-29

## 2020-01-29 DIAGNOSIS — G40.909 SEIZURE DISORDER (H): ICD-10-CM

## 2020-01-29 NOTE — TELEPHONE ENCOUNTER
Received form from [facility] [employer], form was signed by provider fax to [phone], sen to be scanned

## 2020-01-29 NOTE — TELEPHONE ENCOUNTER
Rx Authorization:    Requested Medication/ Dose:500 mg    Date last refill ordered: 11/1/19    Quantity ordered: 100tgabs    # refills: 3    Date of last clinic visit with ordering provider: 12/17/19    Date of next clinic visit with ordering provider: 3/13/20    All pertinent protocol data (lab date/result):     Include pertinent information from patients message:

## 2020-01-29 NOTE — TELEPHONE ENCOUNTER
[facility] Call Center    Phone Message    May a detailed message be left on voicemail: yes    Reason for Call: Form or Letter   Type or form/letter needing completion: Loss of Consciousness Form   Provider: Dr. Rodriguez   Date form needed: 1/30/20  Once completed: Fax form to: Pt stated is should say on the form,       Action Taken: Message routed to:  [facility] Neuro

## 2020-01-29 NOTE — TELEPHONE ENCOUNTER
Form was given to provider, signed,mailed to patient home and fax to [phone], sent to be scanned

## 2020-01-30 RX ORDER — LEVETIRACETAM 500 MG/1
TABLET ORAL
Qty: 120 TABLET | Refills: 5 | Status: SHIPPED | OUTPATIENT
Start: 2020-01-30 | End: 2020-08-17

## 2020-02-14 DIAGNOSIS — G40.909 SEIZURE DISORDER (H): ICD-10-CM

## 2020-02-14 RX ORDER — LEVETIRACETAM 500 MG/1
TABLET ORAL
Qty: 120 TABLET | Refills: 5 | OUTPATIENT
Start: 2020-02-14

## 2020-02-14 NOTE — TELEPHONE ENCOUNTER
Rx Authorization:    Requested Medication/ Dose levETIRAcetam (KEPPRA) 500 MG tablet    Date last refill ordered: 1/30/2020    Quantity ordered: 120 tabs    # refills: 5    Date of last clinic visit with ordering provider: 12/17/2019    Date of next clinic visit with ordering provider: 03/13/2020    All pertinent protocol data (lab date/result):     Include pertinent information from patients message:

## 2020-04-02 ENCOUNTER — TELEPHONE (OUTPATIENT)
Dept: NEUROLOGY | Facility: CLINIC | Age: 60
End: 2020-04-02

## 2020-04-02 NOTE — TELEPHONE ENCOUNTER
[facility] Call Center    Phone Message    May a detailed message be left on voicemail: yes     Reason for Call: Other: Marcus Rodney's wife is calling because she had heard from other people that people with Parkinson's and Epilepsy are at a higher complication risk if they were to contract COVID 19. She states that her friend got a letter from her  stating this.  Marcus is laid off right now so that is not an issue with being at work right now.   She would like to discuss this with someone from Dr. Rodriguez's office.  She can be reached at [phone].  It's okay to leave a detailed message.  Please call her as soon as you can.      Action Taken: Message routed to:  [facility] Neurology    Travel Screening: Not Applicable

## 2020-04-03 NOTE — TELEPHONE ENCOUNTER
Answered questions regarding covid19 and the patient's risk of complications and of getting it. Referred him to the website [email or web address] for more information.

## 2020-07-24 ENCOUNTER — TELEPHONE (OUTPATIENT)
Dept: NEUROLOGY | Facility: CLINIC | Age: 60
End: 2020-07-24

## 2020-07-24 NOTE — TELEPHONE ENCOUNTER
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

## 2020-07-28 NOTE — TELEPHONE ENCOUNTER
[facility] Call Center    Phone Message    May a detailed message be left on voicemail: no     Reason for Call: Other: Pt's wife: Ronel calling, she hasn't heard about the below question below about Covid and now Ronel was just diagnosed with Shingles on Forehead and Eye. Wants to know how this would effect Marcus.  Please call immediately:  [phone] Ronel-     Action Taken: Message routed to:  [facility] Neurology    Travel Screening: Not Applicable

## 2020-07-28 NOTE — TELEPHONE ENCOUNTER
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    She also reported she has shingles and is wondering if she can spread it to Marcus. She reports Marcus did have the chicken pox when he was a kid. I let her know that shingles will not spread from person to person but active shingles can cause chicken pox in people who have not had chicken pox. She also asks about the shingles vaccine. I informed her to discuss this with Marcus's PCP. Dr. Rodriguez will not be able to advise on if Marcus should get this or not.    Ronel asks that I call her back in a couple of days.

## 2020-07-30 NOTE — TELEPHONE ENCOUNTER
I let Ronel know that Dr. Rodriguez approved of the letter for her not to return to work in-person and this will get sent out today. Ronel appreciated the call back.

## 2020-07-30 NOTE — TELEPHONE ENCOUNTER
[facility] Call Center    Phone Message    May a detailed message be left on voicemail: yes     Reason for Call: Other: Ronel returning Ro's call. Please call her back.      Action Taken: Message routed to:  [facility] sheri neuro     Travel Screening: Not Applicable

## 2020-07-30 NOTE — TELEPHONE ENCOUNTER
Per message from Dr. Rodriguez Ronel should not return to work in-person due to Marcus's increased risk for complications.

## 2020-08-14 ENCOUNTER — TELEPHONE (OUTPATIENT)
Dept: NEUROLOGY | Facility: CLINIC | Age: 60
End: 2020-08-14

## 2020-08-14 DIAGNOSIS — G40.909 SEIZURE DISORDER (H): ICD-10-CM

## 2020-08-14 NOTE — TELEPHONE ENCOUNTER
Rx Authorization:    Requested Medication/ Dose Keppra 500    Date last refill ordered: 1/30/20    Quantity ordered: 120    # refills: 5    Date of last clinic visit with ordering provider: 12/17/19    Date of next clinic visit with ordering provider: 0    All pertinent protocol data (lab date/result):     Include pertinent information from patients message:

## 2020-08-14 NOTE — TELEPHONE ENCOUNTER
[facility] Call Center    Phone Message    May a detailed message be left on voicemail: yes     Reason for Call: Medication Refill Request    Has the patient contacted the pharmacy for the refill? Yes   Name of medication being requested: levETIRAcetam (KEPPRA) 500 MG tablet   Provider who prescribed the medication: Dr Cifuentes  Pharmacy: [id number] [place] [address] AT [facility]  [address]    Date medication is needed: TODAY 8/14/2020  Pt called pharmacy and the pharmacy has been trying to fill this medication for many days and patient only has one more pill.         Action Taken: Message routed to:  [facility] neurology    Travel Screening: Not Applicable

## 2020-08-17 RX ORDER — LEVETIRACETAM 500 MG/1
TABLET ORAL
Qty: 360 TABLET | Refills: 2 | Status: SHIPPED | OUTPATIENT
Start: 2020-08-17 | End: 2021-04-20

## 2020-08-17 NOTE — TELEPHONE ENCOUNTER
Rx Authorization:    Requested Medication/ Dose: Keppra 500    Date last refill ordered: 1/30/20    Quantity ordered: 120    # refills: 5    Date of last clinic visit with ordering provider: 12/17/19    Date of next clinic visit with ordering provider: 0    All pertinent protocol data (lab date/result):    Include pertinent information from patients message:

## 2020-08-17 NOTE — TELEPHONE ENCOUNTER
[facility] Call Center    Phone Message    May a detailed message be left on voicemail: yes     Reason for Call: Medication Refill Request    Has the patient contacted the pharmacy for the refill? Yes   Name of medication being requested: levETIRAcetam (KEPPRA) 500 MG tablet [41998] (Order [id number])     Provider who prescribed the medication: Dr. Rodriguez  Pharmacy: [facility] medication is needed: asap  Requesting a 3 month supply instead a monthly supply.    Spouse is requesting to be called back once Rx request is complete.     Please advise.      Action Taken: Other:  NEUROLOGY    Travel Screening: Not Applicable

## 2020-08-18 NOTE — TELEPHONE ENCOUNTER
"Ronel asked if Dr. Rodriguez is doing virtual visits. I confirmed that he is. She also reported that her work wants a \"certified\" letter regarding her not returning to work due to Marcus's Parkinson's disease. She did not know what this meant and I informed her neither do I. We can get a hand signature on the letter and mail this to her as this may be what they want. Ronel confirmed she would like this done.  Letter given to Dr. Rodriguez for signature.  "

## 2020-08-31 ENCOUNTER — TELEPHONE (OUTPATIENT)
Dept: NEUROLOGY | Facility: CLINIC | Age: 60
End: 2020-08-31

## 2020-08-31 NOTE — TELEPHONE ENCOUNTER
[facility] Call Center    Phone Message    May a detailed message be left on voicemail: yes     Reason for Call: Other: Ronel calling with questions regarding some forms that she would need faxed and filled out. Please call her at your earliest convenience to discuss.     Action Taken: Message routed to:  [facility]  NEUROLOGY    Travel Screening: Not Applicable

## 2020-09-01 NOTE — TELEPHONE ENCOUNTER
Called and spoke with jin and let jin know that I will check tomorrow and I will give her a call tomorrow.

## 2020-09-01 NOTE — TELEPHONE ENCOUNTER
[facility] Call Center    Phone Message    May a detailed message be left on voicemail: yes     Reason for Call: Patients wife Ronel called back to Oliva from message left. Ronel just wants to confirm that clinic received the form that was faxed. Please give Ronel a call back at [phone]. Okay to leave detailed message if unable to reach her.     NOTE: I did confirm clinic fax number with Ronel and she did have the correct number.     Action Taken: Message routed to:  [facility] Neurology    Travel Screening: Not Applicable

## 2020-09-03 ENCOUNTER — DOCUMENTATION ONLY (OUTPATIENT)
Dept: NEUROLOGY | Facility: CLINIC | Age: 60
End: 2020-09-03

## 2020-09-08 ENCOUNTER — MYC REFILL (OUTPATIENT)
Dept: NEUROLOGY | Facility: CLINIC | Age: 60
End: 2020-09-08

## 2020-09-08 DIAGNOSIS — G40.909 SEIZURE DISORDER (H): ICD-10-CM

## 2020-09-08 NOTE — TELEPHONE ENCOUNTER
Rx Authorization:    Requested Medication/ Dose: keppra 500Mg tabs    Date last refill ordered: 8/17/20    Quantity ordered: 360 tabs    # refills: 3    Date of last clinic visit with ordering provider: 12/17/19    Date of next clinic visit with ordering provider: f/u months    All pertinent protocol data (lab date/result):     Include pertinent information from patients message:

## 2020-09-09 RX ORDER — LEVETIRACETAM 500 MG/1
TABLET ORAL
Qty: 360 TABLET | Refills: 2 | OUTPATIENT
Start: 2020-09-09

## 2020-09-09 NOTE — TELEPHONE ENCOUNTER
This pt is not seen in [place]. Routed to the neurology team at the [facility].  Rachel Roche, RNCC  Neurology

## 2020-10-06 ENCOUNTER — VIRTUAL VISIT (OUTPATIENT)
Dept: NEUROLOGY | Facility: CLINIC | Age: 60
End: 2020-10-06
Payer: COMMERCIAL

## 2020-10-06 DIAGNOSIS — G20.A1 PARKINSON'S DISEASE (H): Primary | ICD-10-CM

## 2020-10-06 DIAGNOSIS — G20.A1 PARALYSIS AGITANS (H): ICD-10-CM

## 2020-10-06 PROCEDURE — 99214 OFFICE O/P EST MOD 30 MIN: CPT | Mod: 95 | Performed by: PSYCHIATRY & NEUROLOGY

## 2020-10-06 RX ORDER — CARBIDOPA AND LEVODOPA 25; 100 MG/1; MG/1
1 TABLET ORAL 3 TIMES DAILY
Qty: 405 TABLET | Refills: 3 | COMMUNITY
Start: 2020-10-06 | End: 2021-01-06

## 2020-10-06 NOTE — PATIENT INSTRUCTIONS
Impression:  1.  Mild idiopathic Parkinson's disease  2.  Idiopathic seizure disorder-well-controlled  3.  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  Continue carbidopa/levodopa, 25/100, immediate release, 1 tablet 3 times a day  2.  Continue carbidopa/levodopa, 50/200, ER, 1 tablet at bedtime  3.  Continuelevetiracetam  500 mg, 2 tablets twice daily  4.  Recheck visit in 1 year.    Abelardo Rodriguez MD

## 2020-10-06 NOTE — PROGRESS NOTES
"Marcus Daley is a 60 year old male who is being evaluated via a billable video visit.      The patient has been notified of following:     \"This video visit will be conducted via a call between you and your physician/provider. We have found that certain health care needs can be provided without the need for an in-person physical exam.  This service lets us provide the care you need with a video conversation.  If a prescription is necessary we can send it directly to your pharmacy.  If lab work is needed we can place an order for that and you can then stop by our lab to have the test done at a later time.    Video visits are billed at different rates depending on your insurance coverage.  Please reach out to your insurance provider with any questions.    If during the course of the call the physician/provider feels a video visit is not appropriate, you will not be charged for this service.\"    Patient has given verbal consent for Video visit? YES  How would you like to obtain your AVS? mychart  If you are dropped from the video visit, the video invite should be resent to:   Will anyone else be joining your video visit?       Video-Visit Details    Type of service:  Video Visit    I changed the patient's scheduled in-person visit to a virtual video visit  because of the COVID19 crisis.  The rationale was to protect the patient from unnecessary interpersonal contact.    Chief complaint: Parkinson's disease-seizure disorder    History of present illness:    Mr. Marcus Daley is a 60-year-old gentleman who I follow for mild Parkinson's disease and a past seizure disorder.  He has seen Dr. Cruz in epilepsy consultation.  She felt that we should continue levetiracetam 1000 milligrams twice daily.  The patient had some asymmetry of his hippocampal formations on MRI but he chose not to pursue this.    The patient has done so and has had no subsequent seizures.  He is over a year without any seizures and is very " compliant with his medication.  He asks if I will sign a  certification form and I have no hesitation in doing so.  He will fax it to me.    His Parkinson's remains mild.  He is taking carbidopa/levodopa, 25/100, 1 tablet 3 times a day.  He has no sense of on and no sense of wearing off.  He has no dyskinesia.  He has no side effects with the medication.  He takes a carbidopa levodopa, 5200, ER at bedtime for leg jerking.  He says he sleeps well through the night on most occasions.    He is not having paranoia or thought disorder.  He is having no cognitive decline.  He is very active and exercising regularly.  He is ridden his bike over 300 miles this summer.    Examination:  Expressive facial movements.  Strong speech.  Normal AMRs.  Normal gait.    Impression:  1.  Mild idiopathic Parkinson's disease  2.  Idiopathic seizure disorder-well-controlled  3.  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  Continue carbidopa/levodopa, 25/100, immediate release, 1 tablet 3 times a day  2.  Continue carbidopa/levodopa, 50/200, ER, 1 tablet at bedtime  3.  Continuelevetiracetam  500 mg, 2 tablets twice daily  4.  Recheck visit in 1 year.    Abelardo Rodriguez MD    Video Start Time: 1:00  Video End Time: 1:25    Originating Location (pt. Location): Home    Distant Location (provider location):  [facility]IC [place]     Platform used for Video Visit: Rossy Sparks, EMT

## 2020-10-06 NOTE — LETTER
"10/6/2020       RE: Marcus Daley  [address]  [place] [address]     Dear Colleague,    Thank you for referring your patient, Marcus Daley, to the [facility] [place] at [facility]. Please see a copy of my visit note below.    Marcus Daley is a 60 year old male who is being evaluated via a billable video visit.      The patient has been notified of following:     \"This video visit will be conducted via a call between you and your physician/provider. We have found that certain health care needs can be provided without the need for an in-person physical exam.  This service lets us provide the care you need with a video conversation.  If a prescription is necessary we can send it directly to your pharmacy.  If lab work is needed we can place an order for that and you can then stop by our lab to have the test done at a later time.    Video visits are billed at different rates depending on your insurance coverage.  Please reach out to your insurance provider with any questions.    If during the course of the call the physician/provider feels a video visit is not appropriate, you will not be charged for this service.\"    Patient has given verbal consent for Video visit? YES  How would you like to obtain your AVS? mychart  If you are dropped from the video visit, the video invite should be resent to:   Will anyone else be joining your video visit?       Video-Visit Details    Type of service:  Video Visit    I changed the patient's scheduled in-person visit to a virtual video visit  because of the COVID19 crisis.  The rationale was to protect the patient from unnecessary interpersonal contact.    Chief complaint: Parkinson's disease-seizure disorder    History of present illness:    Mr. Marcus Daley is a 60-year-old gentleman who I follow for mild Parkinson's disease and a past seizure disorder.  He has seen Dr. Cruz in epilepsy " consultation.  She felt that we should continue levetiracetam 1000 milligrams twice daily.  The patient had some asymmetry of his hippocampal formations on MRI but he chose not to pursue this.    The patient has done so and has had no subsequent seizures.  He is over a year without any seizures and is very compliant with his medication.  He asks if I will sign a  certification form and I have no hesitation in doing so.  He will fax it to me.    His Parkinson's remains mild.  He is taking carbidopa/levodopa, 25/100, 1 tablet 3 times a day.  He has no sense of on and no sense of wearing off.  He has no dyskinesia.  He has no side effects with the medication.  He takes a carbidopa levodopa, 5200, ER at bedtime for leg jerking.  He says he sleeps well through the night on most occasions.    He is not having paranoia or thought disorder.  He is having no cognitive decline.  He is very active and exercising regularly.  He is ridden his bike over 300 miles this summer.    Examination:  Expressive facial movements.  Strong speech.  Normal AMRs.  Normal gait.    Impression:  1.  Mild idiopathic Parkinson's disease  2.  Idiopathic seizure disorder-well-controlled  3.  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  Continue carbidopa/levodopa, 25/100, immediate release, 1 tablet 3 times a day  2.  Continue carbidopa/levodopa, 50/200, ER, 1 tablet at bedtime  3.  Continuelevetiracetam  500 mg, 2 tablets twice daily  4.  Recheck visit in 1 year.    Abelardo Rodriguez MD    Video Start Time: 1:00  Video End Time: 1:25    Originating Location (pt. Location): Home    Distant Location (provider location):  [facility] NEUROLOG[facility] CLIN[facility] [place]     Platform used for Video Visit: Rossy Sparks, EMT      Again, thank you for allowing me to participate in the care of your patient.      Sincerely,    Abelardo Rodriguez MD

## 2020-10-06 NOTE — LETTER
10/6/2020       RE: Marcus Daley  [address] [place] [address]     Dear Colleague,    Thank you for referring your patient, Marcus Daley, to the [facility] NEUROLOGY CLINIC [place] at [facility]. Please see a copy of my visit note below.    I changed the patient's scheduled in-person visit to a virtual video visit  because of the COVID19 crisis.  The rationale was to protect the patient from unnecessary interpersonal contact.    Chief complaint: Parkinson's disease-seizure disorder    History of present illness:  Mr. Marcus Daley is a 60-year-old gentleman who I follow for mild Parkinson's disease and a past seizure disorder.  He has seen Dr. Cruz in epilepsy consultation.  She felt that we should continue levetiracetam 1000 milligrams twice daily.  The patient had some asymmetry of his hippocampal formations on MRI but he chose not to pursue this.    The patient has done so and has had no subsequent seizures.  He is over a year without any seizures and is very compliant with his medication.  He asks if I will sign a  certification form and I have no hesitation in doing so.  He will fax it to me.    His Parkinson's remains mild.  He is taking carbidopa/levodopa, 25/100, 1 tablet 3 times a day.  He has no sense of on and no sense of wearing off.  He has no dyskinesia.  He has no side effects with the medication.  He takes a carbidopa levodopa, 5200, ER at bedtime for leg jerking.  He says he sleeps well through the night on most occasions.    He is not having paranoia or thought disorder.  He is having no cognitive decline.  He is very active and exercising regularly.  He is ridden his bike over 300 miles this summer.    Examination:  Expressive facial movements.  Strong speech.  Normal AMRs.  Normal gait.    Impression:  1.  Mild idiopathic Parkinson's disease  2.  Idiopathic seizure disorder-well-controlled  3.  Hippocampal asymmetry  on MRI    Recommendations:  1.  Continue carbidopa/levodopa, 25/100, immediate release, 1 tablet 3 times a day  2.  Continue carbidopa/levodopa, 50/200, ER, 1 tablet at bedtime  3.  Continuelevetiracetam  500 mg, 2 tablets twice daily  4.  Recheck visit in 1 year.      Again, thank you for allowing me to participate in the care of your patient.  Sincerely,    Abelardo Rodriguez MD

## 2020-10-12 ENCOUNTER — TELEPHONE (OUTPATIENT)
Dept: NEUROLOGY | Facility: CLINIC | Age: 60
End: 2020-10-12

## 2020-10-12 NOTE — TELEPHONE ENCOUNTER
[facility] Call Center    Phone Message    May a detailed message be left on voicemail: yes     Reason for Call: Other: Marcus calling to request a call back to confirm the receipt of a fax that he sent on 10/6/20. He stated it was supposed to be attached to an email and sent to him. Please call Marcus to confirm.     Action Taken: Message routed to:  [facility]    Travel Screening: Not Applicable

## 2020-10-13 ENCOUNTER — DOCUMENTATION ONLY (OUTPATIENT)
Dept: NEUROLOGY | Facility: CLINIC | Age: 60
End: 2020-10-13

## 2020-10-13 NOTE — PROGRESS NOTES
Received DMV form from patient form was filled out and signed by provider. For was sent to be scanned and emailed to patient at [email or web address] per patient request

## 2020-10-28 ENCOUNTER — TELEPHONE (OUTPATIENT)
Dept: NEUROLOGY | Facility: CLINIC | Age: 60
End: 2020-10-28

## 2020-10-28 NOTE — TELEPHONE ENCOUNTER
Writer found a DMV form in her papers that should have been sent to Dr. Cruz. Contacted [facility] forms germán and RN Pillo Tom to report this.    Faxed form to [facility] at [phone]. Called patient to inform.    The form is dated 11/25/19 so patient believes it is old and not an issue.

## 2020-11-22 ENCOUNTER — HEALTH MAINTENANCE LETTER (OUTPATIENT)
Age: 60
End: 2020-11-22

## 2021-01-06 DIAGNOSIS — G20.A1 PARALYSIS AGITANS (H): ICD-10-CM

## 2021-01-06 NOTE — TELEPHONE ENCOUNTER
[facility] Call Center    Phone Message    May a detailed message be left on voicemail: yes     Reason for Call: Medication Refill Request    Has the patient contacted the pharmacy for the refill? Yes   Name of medication being requested: carbidopa-levodopa (SINEMET)  MG tablet [23436] (Order [id number])  Provider who prescribed the medication: Dr. Rodriguez   Pharmacy: [facility] [address] [address]  Date medication is needed: asap    Patient calling to get refill of medication. Patient asking for a call back once refill has been sent over to pharmacy.     Please advise and call patient back with any questions       Action Taken: Other: [facility]    Travel Screening: Not Applicable

## 2021-01-07 RX ORDER — CARBIDOPA AND LEVODOPA 25; 100 MG/1; MG/1
1 TABLET ORAL 3 TIMES DAILY
Qty: 405 TABLET | Refills: 3 | Status: SHIPPED | OUTPATIENT
Start: 2021-01-07 | End: 2021-04-20

## 2021-03-29 ENCOUNTER — TELEPHONE (OUTPATIENT)
Dept: NEUROLOGY | Facility: CLINIC | Age: 61
End: 2021-03-29

## 2021-03-29 NOTE — TELEPHONE ENCOUNTER
Called and spoke with patient wife and let her know that she will need to get a lost of consciousness form from the DMV, patient wife stated that she will get the form and scan into patient my chart, I let patient know that I will be looking for the form to come thru on patient diane

## 2021-03-29 NOTE — TELEPHONE ENCOUNTER
[facility] Call Center    Phone Message    May a detailed message be left on voicemail: yes     Reason for Call: Form or Letter   Type or form/letter needing completion: form for the [place] [employer] is needed for the past few years due to pt having an epileptic seizure.    Provider: Dr. Rodriguez  Date form needed: 5/02/21  Once completed: pt's wife, Ronel, is asking IF this form can be emailed? [email or web address]    Please call Ronel to let her know the status of this request. Thank you.    Action Taken: Message routed to:  [facility]    Travel Screening: Not Applicable                                                                    .

## 2021-04-06 ENCOUNTER — MYC MEDICAL ADVICE (OUTPATIENT)
Dept: NEUROLOGY | Facility: CLINIC | Age: 61
End: 2021-04-06

## 2021-04-09 NOTE — TELEPHONE ENCOUNTER
[facility] Call Center    Phone Message    May a detailed message be left on voicemail: yes     Reason for Call: Form or Letter   Type or form/letter needing completion: Loss of Consciousness  Provider: Dr. Buck  Date form needed: ASAP    Pt's spouse Ronel called checking to see if this form was received and the status of it being filled out and faxed to the DMV.    Please call Ronel back to advise.    Action Taken: Message routed to:  [facility]    Travel Screening: Not Applicable

## 2021-04-20 ENCOUNTER — VIRTUAL VISIT (OUTPATIENT)
Dept: NEUROLOGY | Facility: CLINIC | Age: 61
End: 2021-04-20
Payer: COMMERCIAL

## 2021-04-20 DIAGNOSIS — G20.A1 PARALYSIS AGITANS (H): ICD-10-CM

## 2021-04-20 DIAGNOSIS — G40.909 SEIZURE DISORDER (H): ICD-10-CM

## 2021-04-20 DIAGNOSIS — G40.109 FOCAL EPILEPSY (H): Primary | ICD-10-CM

## 2021-04-20 RX ORDER — CARBIDOPA AND LEVODOPA 50; 200 MG/1; MG/1
1 TABLET, EXTENDED RELEASE ORAL AT BEDTIME
Qty: 92 TABLET | Refills: 3 | Status: SHIPPED | OUTPATIENT
Start: 2021-04-20 | End: 2021-12-07

## 2021-04-20 RX ORDER — LEVETIRACETAM 500 MG/1
TABLET ORAL
Qty: 360 TABLET | Refills: 3 | Status: SHIPPED | OUTPATIENT
Start: 2021-04-20 | End: 2021-12-07

## 2021-04-20 RX ORDER — CARBIDOPA AND LEVODOPA 25; 100 MG/1; MG/1
1 TABLET ORAL 3 TIMES DAILY
Qty: 276 TABLET | Refills: 3 | Status: SHIPPED | OUTPATIENT
Start: 2021-04-20 | End: 2021-12-07

## 2021-04-20 NOTE — PROGRESS NOTES
"Called patient, left voicemail message to call for rooming prior to appt.    Marcus is a 60 year old who is being evaluated via a billable video visit.      How would you like to obtain your AVS?    Video Start Time: 1:02    Video-Visit Details    Type of service:  Video Visit    Video Time:1:24    Originating Location (pt. Location): Home    Distant Location (provider location):  [facility]     Platform used for Video Visit: [employer]/[facility] Progress Note      Patient:  Marcus Daley  :  1960   Age:  60 year old   Today's Video Visit:  2021      History of Present Illness:    Mr. Marcus Daley was seen in 2019 for the first time for his seiuzres.  He is accompanied by his wife in this visit.  He has been followed by Dr. Rodriguez who for his Parkinson's disease, who was also prescribing his levetiracetam and his seizures have been controlled.  He stated Dr. Rodriguez was not available, so he is wanting to follow up with me for his epilepsy and Parkinson's disease.     He has not had any seizures since last visit.  He has only had 2 nocturnal seizures that were witnessed by his wife and he had \"wild shaking\" and no seizures since started levetiracetam.\"  I looked at his EEG on 2019 which was a normal awake and sleep EEG.  He is taking levetiracetam 1000 mg twice a day.  Denies side effects.      Regarding his Parkinson's disease, I reviewed Dr. Rodriguez's last note 10/6/2020.  He has mild idiopathic Parkinson's disease.  There was no cognitive decline, speech difficulty, impaired alternative movements or gait difficulty was noted in that visit.    Low he noted tremors in right hand when playing guitar approximately in 2018.  Then he also noted he had shaking in his hands when he was doing things like cleaning and fine motor skills.  He was Dx'ed with parkinson's dis May 2018.   He is taking levodopa-carbidopa 25/100, 1 tablet 3 times " a day, and carbidopa/levodopa, 50/200, ER, 1 tablet at bedtime. The bedtime Sinemet was added 12/2019 for frequent leg movements during the night.  Denies side effects.  No motor fluctuations.     He is back to work for the past 3 1/2 months. He denies issues with tremors. 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    He rides a bike every night when the weather is good. He denies imbalance, falls or difficulty walking.    Current Outpatient Medications   Medication Sig Dispense Refill     carbidopa-levodopa (SINEMET CR)  MG CR tablet Take 1 tablet by mouth At Bedtime 90 tablet 3     carbidopa-levodopa (SINEMET)  MG tablet Take 1 tablet by mouth 3 times daily 405 tablet 3     levETIRAcetam (KEPPRA) 500 MG tablet Take one tablet twice a day for two weeks then increase to two tablets twice a day 360 tablet 2      Medication Notes:        AED Medication Compliance:  compliant most of the time    Review of Systems:  ROS was done and pertinent positive points were mentioned in HPI.    Other Issues:    Is patient safe to drive:  Yes    Exam:    Wt Readings from Last 5 Encounters:   12/17/19 95.3 kg (210 lb)   11/11/19 95.3 kg (210 lb)   07/12/19 94.6 kg (208 lb 8 oz)   06/21/19 94.5 kg (208 lb 6.4 oz)   11/16/18 93.6 kg (206 lb 4.8 oz)     Alert, awake, NAD, no aphasia or dysarthria, slight decreased facial expression, EOMI, face is symmetric, normal finger-to-nose, normal finger tapping, with limited video image no tremors were noted, moves upper extremities against gravity.    Assessment and Plan:  1. Epilepsy, nocturnal seizures x2: Seizures are controlled on levetiracetam monotherapy.  Patient denies side effects.    -Continue taking levetiracetam 1000 mg twice a day.  -Obtain levetiracetam level for efficacy and toxicity.    2.  Mild Parkinson's disease: Patient's tremor is well controlled on current dose of levodopa- carbidopa.  He denies difficulty walking, imbalance, falls.    -  Continue taking levodopa-carbidopa 25/100, 3 times a day  - Continue carbidopa/levodopa, 50/200, ER, 1 tablet at bedtime    - Return to clinic in 1 year.      As described above, I met with the patient and his family for 21 minutes via [place] and during this time counseling was greater than 50% of the visit time. I spent 10 more minutes reviewing patient's chart including Dr. Rodriguez's notes.   Aidee Buck MD

## 2021-04-20 NOTE — LETTER
"2021       RE: Marcus Daley  : 1960   MRN: [id number]      Dear Colleague,    Thank you for referring your patient, Marcus Daley, to the [facility] EPI[facility] at [facility]. Please see a copy of my visit note below.    Called patient, left voicemail message to call for rooming prior to appt.    Marcus is a 60 year old who is being evaluated via a billable video visit.      How would you like to obtain your AVS?    Video Start Time: 1:02    Video-Visit Details    Type of service:  Video Visit    Video Time:1:24    Originating Location (pt. Location): Home    Distant Location (provider location):  [facility]     Platform used for Video Visit: [facility] Progress Note      Patient:  Marcus Daley  :  1960   Age:  60 year old   Today's Video Visit:  2021      History of Present Illness:    Mr. Marcus Daley was seen in 2019 for the first time for his seiuzres.  He is accompanied by his wife in this visit.  He has been followed by Dr. Rodriguez who for his Parkinson's disease, who was also prescribing his levetiracetam and his seizures have been controlled.  He stated Dr. Rodriguez was not available, so he is wanting to follow up with me for his epilepsy and Parkinson's disease.     He has not had any seizures since last visit.  He has only had 2 nocturnal seizures that were witnessed by his wife and he had \"wild shaking\" and no seizures since started levetiracetam.\"  I looked at his EEG on 2019 which was a normal awake and sleep EEG.  He is taking levetiracetam 1000 mg twice a day.  Denies side effects.      Regarding his Parkinson's disease, I reviewed Dr. Rodriguez's last note 10/6/2020.  He has mild idiopathic Parkinson's disease.  There was no cognitive decline, speech difficulty, impaired alternative movements or gait difficulty was noted in that visit.    Low he " noted tremors in right hand when playing guitar approximately in March 2018.  Then he also noted he had shaking in his hands when he was doing things like cleaning and fine motor skills.  He was Dx'ed with parkinson's dis May 2018.   He is taking levodopa-carbidopa 25/100, 1 tablet 3 times a day, and carbidopa/levodopa, 50/200, ER, 1 tablet at bedtime. The bedtime Sinemet was added 12/2019 for frequent leg movements during the night.  Denies side effects.  No motor fluctuations.     He is back to work for the past 3 1/2 months. He denies issues with tremors. 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    He rides a bike every night when the weather is good. He denies imbalance, falls or difficulty walking.    Current Outpatient Medications   Medication Sig Dispense Refill     carbidopa-levodopa (SINEMET CR)  MG CR tablet Take 1 tablet by mouth At Bedtime 90 tablet 3     carbidopa-levodopa (SINEMET)  MG tablet Take 1 tablet by mouth 3 times daily 405 tablet 3     levETIRAcetam (KEPPRA) 500 MG tablet Take one tablet twice a day for two weeks then increase to two tablets twice a day 360 tablet 2      Medication Notes:        AED Medication Compliance:  compliant most of the time    Review of Systems:  ROS was done and pertinent positive points were mentioned in HPI.    Other Issues:    Is patient safe to drive:  Yes    Exam:    Wt Readings from Last 5 Encounters:   12/17/19 95.3 kg (210 lb)   11/11/19 95.3 kg (210 lb)   07/12/19 94.6 kg (208 lb 8 oz)   06/21/19 94.5 kg (208 lb 6.4 oz)   11/16/18 93.6 kg (206 lb 4.8 oz)     Alert, awake, NAD, no aphasia or dysarthria, slight decreased facial expression, EOMI, face is symmetric, normal finger-to-nose, normal finger tapping, with limited video image no tremors were noted, moves upper extremities against gravity.    Assessment and Plan:  1. Epilepsy, nocturnal seizures x2: Seizures are controlled on levetiracetam monotherapy.  Patient  denies side effects.    -Continue taking levetiracetam 1000 mg twice a day.  -Obtain levetiracetam level for efficacy and toxicity.    2.  Mild Parkinson's disease: Patient's tremor is well controlled on current dose of levodopa- carbidopa.  He denies difficulty walking, imbalance, falls.    - Continue taking levodopa-carbidopa 25/100, 3 times a day  - Continue carbidopa/levodopa, 50/200, ER, 1 tablet at bedtime    - Return to clinic in 1 year.      As described above, I met with the patient and his family for 21 minutes via [place] and during this time counseling was greater than 50% of the visit time. I spent 10 more minutes reviewing patient's chart including Dr. Rodriguez's notes.   Aidee Buck MD                        Again, thank you for allowing me to participate in the care of your patient.      Sincerely,    Aidee Buck MD

## 2021-05-11 DIAGNOSIS — G40.109 FOCAL EPILEPSY (H): ICD-10-CM

## 2021-05-11 PROCEDURE — 99000 SPECIMEN HANDLING OFFICE-LAB: CPT | Performed by: PSYCHIATRY & NEUROLOGY

## 2021-05-11 PROCEDURE — 80177 DRUG SCRN QUAN LEVETIRACETAM: CPT | Mod: 90 | Performed by: PSYCHIATRY & NEUROLOGY

## 2021-05-11 PROCEDURE — 36415 COLL VENOUS BLD VENIPUNCTURE: CPT | Performed by: PSYCHIATRY & NEUROLOGY

## 2021-05-12 LAB — LEVETIRACETAM SERPL-MCNC: 24 UG/ML (ref 12–46)

## 2021-09-19 ENCOUNTER — HEALTH MAINTENANCE LETTER (OUTPATIENT)
Age: 61
End: 2021-09-19

## 2021-09-24 ENCOUNTER — TELEPHONE (OUTPATIENT)
Dept: NEUROLOGY | Facility: CLINIC | Age: 61
End: 2021-09-24

## 2021-09-24 NOTE — LETTER
9/27/2021       RE: Marcus Daley  [id number]  [place] [place] [facility] [address]          To Whom It May Concern,      Marcus Daley is a patient under my care for management of epilepsy. Other medical diagnoses are being managed by other health care providers.    Mr Daley is prescribed prescription medications for the seizure disorder.   The last blood level, drawn on 5/21/2021, was appropriate for the dose being taken, indicating compliance with treatment recommendations.    The last reported seizure that  experienced was 11/01/2019.    His next follow up appointment with me is due April 2022.      [place],          Aidee Daly M.D.

## 2021-09-24 NOTE — TELEPHONE ENCOUNTER
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 Please contact to coordinate.      Action Taken: Message routed to:  Adult Clinics: Neurology p [id number]    Travel Screening: Not Applicable

## 2021-09-27 NOTE — TELEPHONE ENCOUNTER
"Call placed to patient to discussed/confirm why the letter is being requested (the standard form has been completed previously and is not due yet), and to make sure the information about seizure treatment and frequency is current.    Discussed with patient.  He has his standard class D license.  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  The told him he would need a letter from his treating physician to be able to proceed to the next level of commercial license, he is not sure what class of license he currently has \"the lowest\"      From [place] DPS DVS website:-  \" - Class A  A Class A License is required to drive any vehicle stef a unit of more than 10,000 pounds Gross Vehicle Weight Rating with a gross combination weight rating (truck plus trailer) over 26,000 pounds.     - Class B  A Class B License is required to drive any single-unit vehicle that is over 26,000 pounds Gross Vehicle Weight Rating.    Class C License is required to drive a single-unit vehicle, 26,000 pounds Gross Vehicle Weight Rating or less, with one or more endorsement for hazardous materials, passenger, or school bus (with passenger endorsement).\"      Letter compiled stating relevant information about current seizure treatment  Placed on house MD workpile for review/signature  To be mailed to patient once approved  "

## 2021-10-18 ENCOUNTER — TELEPHONE (OUTPATIENT)
Dept: NEUROLOGY | Facility: CLINIC | Age: 61
End: 2021-10-18

## 2021-10-18 NOTE — TELEPHONE ENCOUNTER
What is the concern that needs to be addressed by a nurse? 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 Dr. Cruz wrote a letter for the pt's driving privileges 9/24/21, but pt is stating that it was not clear enough. Please call back.     May a detailed message be left on voicemail? yes    Date of last office visit: 4/20/21    Message routed to: mincep rn pool

## 2021-10-18 NOTE — TELEPHONE ENCOUNTER
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 I told him that this isn't something she can provide. Dr. Cruz did provide already the evidence for the DOT commissioner to make that judgement call. He verbalized understanding.

## 2021-10-18 NOTE — TELEPHONE ENCOUNTER
[facility] Call Center    Phone Message    May a detailed message be left on voicemail: yes     Reason for Call: Pt has some questions for Dr. Cruz care team and is requesting a call back.  Pt didn't want to tell me what his questions are.  Thanks.    Action Taken: Message routed to:  Adult Clinics: Neurology p [id number]    Travel Screening: Not Applicable

## 2021-12-07 ENCOUNTER — VIRTUAL VISIT (OUTPATIENT)
Dept: NEUROLOGY | Facility: CLINIC | Age: 61
End: 2021-12-07
Payer: COMMERCIAL

## 2021-12-07 DIAGNOSIS — G20.A1 PARALYSIS AGITANS (H): ICD-10-CM

## 2021-12-07 DIAGNOSIS — G40.909 SEIZURE DISORDER (H): ICD-10-CM

## 2021-12-07 PROCEDURE — 99213 OFFICE O/P EST LOW 20 MIN: CPT | Mod: 95 | Performed by: PSYCHIATRY & NEUROLOGY

## 2021-12-07 RX ORDER — CARBIDOPA AND LEVODOPA 50; 200 MG/1; MG/1
1 TABLET, EXTENDED RELEASE ORAL AT BEDTIME
Qty: 92 TABLET | Refills: 3 | Status: SHIPPED | OUTPATIENT
Start: 2021-12-07 | End: 2022-11-04

## 2021-12-07 RX ORDER — CARBIDOPA AND LEVODOPA 25; 100 MG/1; MG/1
1 TABLET ORAL 3 TIMES DAILY
Qty: 276 TABLET | Refills: 3 | Status: SHIPPED | OUTPATIENT
Start: 2021-12-07 | End: 2022-11-04

## 2021-12-07 RX ORDER — LEVETIRACETAM 500 MG/1
TABLET ORAL
Qty: 360 TABLET | Refills: 3 | Status: SHIPPED | OUTPATIENT
Start: 2021-12-07 | End: 2022-11-04

## 2021-12-07 NOTE — LETTER
12/7/2021        RE: Marcus Daley  [address] [place] [address]     Dear Colleague,    Thank you for referring your patient, Marcus Daley, to the [facility]UROLOG[facility] [place] at [facility]. Please see a copy of my visit note below.    Marcus is a 61 year old who is being evaluated via a billable video visit.      How would you like to obtain your AVS? MyChart  If the video visit is dropped, the invitation should be resent by: Send to e-mail at: [email or web address]  Will anyone else be joining your video visit? No    Video-Visit Details    Type of service:  Video Visit    Chief complaint: Parkinson's disease, seizure disorder    History of present illness:  This is a 61-year-old gentleman who I have followed for some time.  He has    1.  Idiopathic Parkinson's disease    We saw the patient in 2018 for mild hand tremor.  He also had a history of REM behavior disorder.  We started him on carbidopa levodopa and he has had an excellent response.  He really has had no change over the past 3 years.  He continues to have an excellent response to low-dose carbidopa levodopa 25 101 tablet 3 times a day.  He has virtually no tremor and no signs of progression.  He is happy with his Parkinson's response.  He has had a first cousin who has developed Parkinson's disease but no first-degree relatives.    2.  Seizure disorder.  The patient had 2 unprovoked seizures at nighttime.  He would scream and thrash and this was observed by his wife.  We started him on levetiracetam and escalated the dose to 1000 mg twice daily.  The last seizure was on 11/1/2019.  He has had no seizures since.  We permitted driving after 6 months of medical compliance and seizure-free interval.  He now wonders about getting a commercial license.  He is looked into it and if we write that he has a seizure disorder and not use the term epilepsy this may qualify him as he is 2  years seizure-free.      Examination  Facial expression is normal.  Voice is normal.  He gets up and walks normally.  AMRs in the fingers and hands are normal.    Impression:  1.  Mild idiopathic Parkinson's disease  2.  Seizure disorder    Recommendations:  1.  Continue carbidopa levodopa, 25/100, 1 tablet 3 times a day  2.  Continue carbidopa levodopa 5200 CR 1 tablet at bedtime  3.  Continue levetiracetam 500 mg 2 tablets daily  4.  I informed the patient I am retiring.  They will follow with Dr. Hever preston at [facility].      Originating Location (pt. Location): Home    Distant Location (provider location):  [facility]OLOG[facility] CLIN[facility] [place]     Platform used for Video Visit: [employer]     20 minutes spent reviewing records, seeing patient over video visit and preparing this report today.    Chief Complaint   Patient presents with     RECHECK     VIDEO VISIT RETURN       Jarrett Hart        Again, thank you for allowing me to participate in the care of your patient.      Sincerely,    Abelardo Rodriguez MD

## 2021-12-07 NOTE — PROGRESS NOTES
Marcus is a 61 year old who is being evaluated via a billable video visit.      How would you like to obtain your AVS? MyChart  If the video visit is dropped, the invitation should be resent by: Send to e-mail at: [email or web address]  Will anyone else be joining your video visit? No    Video-Visit Details    Type of service:  Video Visit    Chief complaint: Parkinson's disease, seizure disorder    History of present illness:  This is a 61-year-old gentleman who I have followed for some time.  He has    1.  Idiopathic Parkinson's disease    We saw the patient in 2018 for mild hand tremor.  He also had a history of REM behavior disorder.  We started him on carbidopa levodopa and he has had an excellent response.  He really has had no change over the past 3 years.  He continues to have an excellent response to low-dose carbidopa levodopa 25 101 tablet 3 times a day.  He has virtually no tremor and no signs of progression.  He is happy with his Parkinson's response.  He has had a first cousin who has developed Parkinson's disease but no first-degree relatives.    2.  Seizure disorder.  The patient had 2 unprovoked seizures at nighttime.  He would scream and thrash and this was observed by his wife.  We started him on levetiracetam and escalated the dose to 1000 mg twice daily.  The last seizure was on 11/1/2019.  He has had no seizures since.  We permitted driving after 6 months of medical compliance and seizure-free interval.  He now wonders about getting a commercial license.  He is looked into it and if we write that he has a seizure disorder and not use the term epilepsy this may qualify him as he is 2 years seizure-free.      Examination  Facial expression is normal.  Voice is normal.  He gets up and walks normally.  AMRs in the fingers and hands are normal.    Impression:  1.  Mild idiopathic Parkinson's disease  2.  Seizure disorder    Recommendations:  1.  Continue carbidopa levodopa, 25/100, 1 tablet 3 times a  day  2.  Continue carbidopa levodopa 5200 CR 1 tablet at bedtime  3.  Continue levetiracetam 500 mg 2 tablets daily  4.  I informed the patient I am retiring.  They will follow with Dr. Kathleen up at [facility].      Originating Location (pt. Location): Home    Distant Location (provider location):  [facility] NEUROLOGY [facility]     Platform used for Video Visit: "[employer]"     20 minutes spent reviewing records, seeing patient over video visit and preparing this report today.    Chief Complaint   Patient presents with     RECHECK     VIDEO VISIT RETURN       Jarrett Hart

## 2022-01-09 ENCOUNTER — HEALTH MAINTENANCE LETTER (OUTPATIENT)
Age: 62
End: 2022-01-09

## 2022-05-02 PROBLEM — G40.909 SEIZURE DISORDER (H): Status: ACTIVE | Noted: 2019-06-19

## 2022-05-02 NOTE — TELEPHONE ENCOUNTER
RECORDS RECEIVED FROM: Internal   REASON FOR VISIT: Parkinsons and Sz   Date of Appt: 05/09/2022   NOTES (FOR ALL VISITS) STATUS DETAILS   OFFICE NOTE from referring provider N/A    OFFICE NOTE from other specialist Internal 12/07/2021 Dr Rodriguez [facility]    DISCHARGE SUMMARY from hospital N/A    DISCHARGE REPORT from the ER N/A    OPERATIVE REPORT N/A    MEDICATION LIST N/A    IMAGING  (FOR ALL VISITS)     EMG N/A    EEG N/A    LUMBAR PUNCTURE N/A    MARCO SCAN N/A    ULTRASOUND (CAROTID BILAT) *VASCULAR* N/A    MRI (HEAD, NECK, SPINE) Internal 06/26/2019 brain   05/17/2018 cervical spine   CT (HEAD, NECK, SPINE) N/A

## 2022-05-02 NOTE — PROGRESS NOTES
Diagnosis/Summary/Recommendations:    PATIENT: Marcus Daley  61 year old male     : 1960    JOHN: May 9, 2022    MRN: [id number]    [address] [id number] [phone] (M)   [phone] (H)   [phone] (W)     [email or web address]  Ronel Daley  [phone]    jennifer [email or web address]    Assessment:  (G20) Paralysis agitans (H)  (primary encounter diagnosis)    FROM ANA 2021  We saw the patient in 2018 for mild hand tremor.  He also had a history of REM behavior disorder.  We started him on carbidopa levodopa and he has had an excellent response.  He really has had no change over the past 3 years.  He continues to have an excellent response to low-dose carbidopa levodopa 25 101 tablet 3 times a day.  He has virtually no tremor and no signs of progression.  He is happy with his Parkinson's response.  He has had a first cousin who has developed Parkinson's disease but no first-degree relatives.    VOICE CHANGES  dysphonia  RIGHT ARM CHANGES NOTICED 2017 CHANGES IN HANDWRITING  2018 DIAGNOSIS PARKINSON WITH DR PEREZ - HAD SEEN GENESIS PREVIOUSLY 2018    Review of diagnosis    Parkinson's disease     Avoidance of dopamine blockers   Not taking    Motor complication review   No clear wearing off and doing well.     Review of Impulse control disorders   Not having this    Review of surgical or medication options   reviewed    Gait/Balance/Falls   No recent    Exercise/Therapy performed/offered   Health club member for 38 years - [employer]  Tries to do daily stretches and aerobics  Has not done big and loud therapy    Cognitive/Driving   Driving   DOT physical 2021 and will need another evaluation 2022 and will have this done at [facility]     Mood   Will start golfing again  Mood down this winter  Sun room    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 instruction    Hallucinations/delusions   No shadows    Sleep   Goes to sleep 1130pm  Pretty quickly  falls asleep  Snoring  Not clearly talking  Had one rare episode of physical movement  He punched through a pillow one occasion - RBD  Consider sleep consultation  May wake up to go the bathroom at 3am and usually can go back to sleep   Wakes up for the day at 8 or 830am   He is not taking melatonin 3-5mg and titrated upwards for rem sleep disorder    Bladder/Renal/Prostate/Gyn/Other   age related issues  Nocturia 1/noc  No clear urgency or frequency  Some dribbling    GI/Constipation/GERD   Constipation - occasional laxative  Bowel movements are daily or other day    ENDO/Lipid/DM/Bone density/Thyroid  denies    Cardio/heart/Hyper or Hypotensive   143/81 -   Denies blood pressure problems    Vision/Dry Eyes/Cataracts/Glaucoma/Macular   Denies eye problems    Heme/Anticoagulation/Antiplatelet/Anemia/Other  Not taking an aspirin    ENT/Resp  ANOSMIA/hyposmia  Dysphonia  Muscle tension dysphonia      Skin/Cancer/Seborrhea/other  Denies cancer     Musculoskeletal/Pain/Headache  Upper spine - stiff neck - manageable with heat or ice    Other:  SEIZURE free since November 2019    Seizure disorder.  The patient had 2 unprovoked seizures at nighttime.  He would scream and thrash and this was observed by his wife.  We started him on levetiracetam and escalated the dose to 1000 mg twice daily.  The last seizure was on 11/1/2019.  He has had no seizures since.  We permitted driving after 6 months of medical compliance and seizure-free interval.  He now wonders about getting a commercial license.  He is looked into it and if we write that he has a seizure disorder and not use the term epilepsy this may qualify him as he is 2 years seizure-free.    NORMAL HEAD CT SCAN 6/19/2019  1.  Normal exam.      BRAIN MRI 6/26/2019  1. No acute intracranial pathology.  2. Mild patchy white matter changes in the katarina which are similar to the prior study and may represent small vessel ischemic disease..     CERVICAL SPINE MRI 5/17/2018  1.  Multilevel cervical spondylosis and degenerative spondylolisthesis. Moderate to advanced neural foraminal stenosis on the left at C6-7.  Moderate neural foraminal stenosis on the left at C3-4 and on the right at C4-5.  2. No significant spinal canal stenosis.  3. No cord signal abnormality.  4. Bilateral atlantooccipital assimilation.    eeg 6/27/2019  This is a normal waking and sleep EEG.      Medications     8am 10a 12p 4/5p 9/930p 10p   Carbidopa/levodopa Sinemet CR 50/200     1    Carbidopa/levodopa Sinemet 25/100 1  1 1     Levetiracetam keppra 500mg  2    2                                                  Slight slowness on walking and asymmetric reduced arm swing.     Plan:    Return to see Dr. Buck and review the keppra     [email or web address]  Introduction to parkinson videos    discussion about management of constipation    Consideration for a sleep consultation at some point about possible sleep apnea and rem sleep disorder    Skin screening examination for cancer at some point.     Pharmacy consultation with Suzan Lua, pharmacist    Big and loud, physical and speech therapy for parkinson  daughter is a dance instructor and may be helpful to share the movement therapy approaches with her.    Granted proxy access to his wife and daughter Neva    Return back in 6-9 months    Zoom visits are possible.       Coding statement:   Medical Decision Making:  #  Chronic progressive medical conditions addressed  - see above --   Review and/or interpretation of unique test or documentation from a provider outside of neurology no   Independent historian provided additional details  Yes  - spouse  Prescription drug management and review of potential side effects and/or monitoring for side effects  -- see above ---  Health impacted by social determinants of health  no    I have reviewed the note as documented above.  This accurately captures the substance of my conversation with the patient and total  time spent preparing for visit, executing visit and completing visit on the day of the visit:  60 minutes.  The portion of this total time included face to face time 3pm - 355pm    Sreedhar Kathleen MD     ______________________________________    Last visit date and details:             ______________________________________      Patient was asked about 14 Review of systems including changes in vision (dry eyes, double vision), hearing, heart, lungs, musculoskeletal, depression, anxiety, snoring, RBD, insomnia, urinary frequency, urinary urgency, constipation, swallowing problems, hematological, ID, allergies, skin problems: seborrhea, endocrinological: thyroid, diabetes, cholesterol; balance, weight changes, and other neurological problems and these were not significant at this time except for   Patient Active Problem List   Diagnosis     Paralysis agitans (H)     Parkinson's disease (H)     Anxiety state     Bilateral inguinal hernia     Chest pain     GERD (gastroesophageal reflux disease)     HTN (hypertension)     Knee pain, left     MAO (obstructive sleep apnea)     Seizure disorder (H)     Spastic dysphonia     Tachycardia     Tobacco use disorder        No Known Allergies  No past surgical history on file.  Past Medical History:   Diagnosis Date     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     Social History     Socioeconomic History     Marital status:      Spouse name: Not on file     Number of children: Not on file     Years of education: Not on file     Highest education level: Not on file   Occupational History     Not on file   Tobacco Use     Smoking status: Light Tobacco Smoker     Types: Cigars     Smokeless tobacco: Never Used   Substance and Sexual Activity     Alcohol use: Yes     Drug use: No     Sexual activity: Yes   Other Topics Concern     Parent/sibling w/ CABG, MI or angioplasty before 65F 55M? Not Asked   Social History Narrative     Not on file     Social Determinants of Health     Financial Resource  Strain: Not on file   Food Insecurity: Not on file   Transportation Needs: Not on file   Physical Activity: Not on file   Stress: Not on file   Social Connections: Not on file   Intimate Partner Violence: Not on file   Housing Stability: Not on file       Drug and lactation database from the [place] [employer]:  http://toxnet.nlm.nih.gov/cgi-bin/sis/htmlgen?LACT      B/P: Data Unavailable, T: Data Unavailable, P: Data Unavailable, R: Data Unavailable 0 lbs 0 oz  There were no vitals taken for this visit., There is no height or weight on file to calculate BMI.  Medications and Vitals not listed above were documented in the cart and reviewed by me.     Current Outpatient Medications   Medication Sig Dispense Refill     carbidopa-levodopa (SINEMET CR)  MG CR tablet Take 1 tablet by mouth At Bedtime 92 tablet 3     carbidopa-levodopa (SINEMET)  MG tablet Take 1 tablet by mouth 3 times daily 276 tablet 3     levETIRAcetam (KEPPRA) 500 MG tablet Take two tablets twice a day. 360 tablet 3         Sreedhar Kathleen MD

## 2022-05-09 ENCOUNTER — PRE VISIT (OUTPATIENT)
Dept: NEUROLOGY | Facility: CLINIC | Age: 62
End: 2022-05-09

## 2022-05-09 ENCOUNTER — OFFICE VISIT (OUTPATIENT)
Dept: NEUROLOGY | Facility: CLINIC | Age: 62
End: 2022-05-09
Payer: COMMERCIAL

## 2022-05-09 VITALS
DIASTOLIC BLOOD PRESSURE: 81 MMHG | SYSTOLIC BLOOD PRESSURE: 143 MMHG | WEIGHT: 210 LBS | HEIGHT: 72 IN | HEART RATE: 76 BPM | BODY MASS INDEX: 28.44 KG/M2

## 2022-05-09 DIAGNOSIS — F80.0 ARTICULATION DISORDER: ICD-10-CM

## 2022-05-09 DIAGNOSIS — G20.A1 PARALYSIS AGITANS (H): Primary | ICD-10-CM

## 2022-05-09 DIAGNOSIS — G40.909 SEIZURE DISORDER (H): ICD-10-CM

## 2022-05-09 PROCEDURE — 99215 OFFICE O/P EST HI 40 MIN: CPT | Performed by: PSYCHIATRY & NEUROLOGY

## 2022-05-09 ASSESSMENT — PAIN SCALES - GENERAL: PAINLEVEL: NO PAIN (0)

## 2022-05-09 NOTE — PATIENT INSTRUCTIONS
Assessment:  (G20) Paralysis agitans (H)  (primary encounter diagnosis)    FROM ANA 12/2021  We saw the patient in 2018 for mild hand tremor.  He also had a history of REM behavior disorder.  We started him on carbidopa levodopa and he has had an excellent response.  He really has had no change over the past 3 years.  He continues to have an excellent response to low-dose carbidopa levodopa 25 101 tablet 3 times a day.  He has virtually no tremor and no signs of progression.  He is happy with his Parkinson's response.  He has had a first cousin who has developed Parkinson's disease but no first-degree relatives.    VOICE CHANGES 2010 dysphonia  RIGHT ARM CHANGES NOTICED 2017 January 2018 CHANGES IN HANDWRITING  6/2018 DIAGNOSIS PARKINSON WITH DR PEREZ - HAD SEEN GENESIS PREVIOUSLY 5/2018    Review of diagnosis    Parkinson's disease     Avoidance of dopamine blockers   Not taking    Motor complication review   No clear wearing off and doing well.     Review of Impulse control disorders   Not having this    Review of surgical or medication options   reviewed    Gait/Balance/Falls   No recent    Exercise/Therapy performed/offered   Health club member for 38 years - [employer]  Tries to do daily stretches and aerobics  Has not done big and loud therapy    Cognitive/Driving   Driving   DOT physical October 2021 and will need another evaluation October 2022 and will have this done at [facility]     Mood   Will start golfing again  Mood down this winter  Sun room    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 instruction    Hallucinations/delusions   No shadows    Sleep   Goes to sleep 1130pm  Pretty quickly falls asleep  Snoring  Not clearly talking  Had one rare episode of physical movement  He punched through a pillow one occasion - RBD  Consider sleep consultation  May wake up to go the bathroom at 3am and usually can go back to sleep   Wakes up for the day at 8 or 830am   He is not taking melatonin 3-5mg and titrated upwards  for rem sleep disorder    Bladder/Renal/Prostate/Gyn/Other   age related issues  Nocturia 1/noc  No clear urgency or frequency  Some dribbling    GI/Constipation/GERD   Constipation - occasional laxative  Bowel movements are daily or other day    ENDO/Lipid/DM/Bone density/Thyroid  denies    Cardio/heart/Hyper or Hypotensive   143/81 -   Denies blood pressure problems    Vision/Dry Eyes/Cataracts/Glaucoma/Macular   Denies eye problems    Heme/Anticoagulation/Antiplatelet/Anemia/Other  Not taking an aspirin    ENT/Resp  ANOSMIA/hyposmia    Skin/Cancer/Seborrhea/other  Denies cancer     Musculoskeletal/Pain/Headache  Upper spine - stiff neck - manageable with heat or ice    Other:  SEIZURE free since November 2019    Seizure disorder.  The patient had 2 unprovoked seizures at nighttime.  He would scream and thrash and this was observed by his wife.  We started him on levetiracetam and escalated the dose to 1000 mg twice daily.  The last seizure was on 11/1/2019.  He has had no seizures since.  We permitted driving after 6 months of medical compliance and seizure-free interval.  He now wonders about getting a commercial license.  He is looked into it and if we write that he has a seizure disorder and not use the term epilepsy this may qualify him as he is 2 years seizure-free.    NORMAL HEAD CT SCAN 6/19/2019  1.  Normal exam.      BRAIN MRI 6/26/2019  1. No acute intracranial pathology.  2. Mild patchy white matter changes in the katarina which are similar to the prior study and may represent small vessel ischemic disease..     CERVICAL SPINE MRI 5/17/2018  1. Multilevel cervical spondylosis and degenerative spondylolisthesis. Moderate to advanced neural foraminal stenosis on the left at C6-7.  Moderate neural foraminal stenosis on the left at C3-4 and on the right at C4-5.  2. No significant spinal canal stenosis.  3. No cord signal abnormality.  4. Bilateral atlantooccipital assimilation.    eeg 6/27/2019  This is a  normal waking and sleep EEG.      Medications     8am 10a 12p 4/5p 9/930p 10p   Carbidopa/levodopa Sinemet CR 50/200     1    Carbidopa/levodopa Sinemet 25/100 1  1 1     Levetiracetam keppra 500mg  2    2                                                  Slight slowness on walking and asymmetric reduced arm swing.     Plan:    Return to see Dr. Buck and review the keppra     [email or web address]  Introduction to parkinson videos    discussion about management of constipation    Consideration for a sleep consultation at some point about possible sleep apnea and rem sleep disorder    Skin screening examination for cancer at some point.     Pharmacy consultation with Suzan Lua, pharmacist    Big and loud, physical and speech therapy for parkinson  daughter is a dance instructor and may be helpful to share the movement therapy approaches with her.    Granted proxy access to his wife    Return back in 6-9 months    Zoom visits are possible.

## 2022-05-09 NOTE — PROGRESS NOTES
Marcus Daley's goals for this visit include:   Chief Complaint   Patient presents with     Consult For     former pt of Dr.Matsumoto Janie Wilkinson and Sz records in epic appt hussein with wife       He requests these members of his care team be copied on today's visit information: yes    PCP: Murphy Jalloh    Referring Provider:  Abelardo Rodriguez MD  [address] [place] [address]    BP (!) 143/81 (BP Location: Right arm, Patient Position: Sitting, Cuff Size: Adult Regular)   Pulse 76   Ht 1.829 m (6')   Wt 95.3 kg (210 lb)   BMI 28.48 kg/m      Do you need any medication refills at today's visit? Yes  Can you prescribe meds for this patient?  Wilton Dominguez CMA    '

## 2022-05-09 NOTE — LETTER
2022         RE: Marcus Daley  [address] [place] [address]        Dear Colleague,    Thank you for referring your patient, Marcus Daley, to the [facility] NEUROLOGY CLINIC [place]. Please see a copy of my visit note below.          Diagnosis/Summary/Recommendations:    PATIENT: Marcus Daley  61 year old male     : 1960    JOHN: May 9, 2022    MRN: [id number]    [address] [place] [id number] [phone] (M)   [phone] (H)   [phone] (W)     [email or web address]  Ronel Thuy  [phone]    jennifer daley    Assessment:  (G20) Paralysis agitans (H)  (primary encounter diagnosis)    FROM ANA 2021  We saw the patient in  for mild hand tremor.  He also had a history of REM behavior disorder.  We started him on carbidopa levodopa and he has had an excellent response.  He really has had no change over the past 3 years.  He continues to have an excellent response to low-dose carbidopa levodopa 25 101 tablet 3 times a day.  He has virtually no tremor and no signs of progression.  He is happy with his Parkinson's response.  He has had a first cousin who has developed Parkinson's disease but no first-degree relatives.    VOICE CHANGES  dysphonia  RIGHT ARM CHANGES NOTICED 2017 CHANGES IN HANDWRITING  2018 DIAGNOSIS PARKINSON WITH DR PEREZ - HAD SEEN GENESIS PREVIOUSLY 2018    Review of diagnosis    Parkinson's disease     Avoidance of dopamine blockers   Not taking    Motor complication review   No clear wearing off and doing well.     Review of Impulse control disorders   Not having this    Review of surgical or medication options   reviewed    Gait/Balance/Falls   No recent    Exercise/Therapy performed/offered   Health club member for 38 years - [employer]  Tries to do daily stretches and aerobics  Has not done big and loud therapy    Cognitive/Driving   Driving   DOT physical 2021 and will need  another evaluation October 2022 and will have this done at [facility]     Mood   Will start golfing again  Mood down this winter  Sun room    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 instruction    Hallucinations/delusions   No shadows    Sleep   Goes to sleep 1130pm  Pretty quickly falls asleep  Snoring  Not clearly talking  Had one rare episode of physical movement  He punched through a pillow one occasion - RBD  Consider sleep consultation  May wake up to go the bathroom at 3am and usually can go back to sleep   Wakes up for the day at 8 or 830am   He is not taking melatonin 3-5mg and titrated upwards for rem sleep disorder    Bladder/Renal/Prostate/Gyn/Other   age related issues  Nocturia 1/noc  No clear urgency or frequency  Some dribbling    GI/Constipation/GERD   Constipation - occasional laxative  Bowel movements are daily or other day    ENDO/Lipid/DM/Bone density/Thyroid  denies    Cardio/heart/Hyper or Hypotensive   143/81 -   Denies blood pressure problems    Vision/Dry Eyes/Cataracts/Glaucoma/Macular   Denies eye problems    Heme/Anticoagulation/Antiplatelet/Anemia/Other  Not taking an aspirin    ENT/Resp  ANOSMIA/hyposmia  Dysphonia  Muscle tension dysphonia      Skin/Cancer/Seborrhea/other  Denies cancer     Musculoskeletal/Pain/Headache  Upper spine - stiff neck - manageable with heat or ice    Other:  SEIZURE free since November 2019    Seizure disorder.  The patient had 2 unprovoked seizures at nighttime.  He would scream and thrash and this was observed by his wife.  We started him on levetiracetam and escalated the dose to 1000 mg twice daily.  The last seizure was on 11/1/2019.  He has had no seizures since.  We permitted driving after 6 months of medical compliance and seizure-free interval.  He now wonders about getting a commercial license.  He is looked into it and if we write that he has a seizure disorder and not use the term epilepsy this may qualify him as he is 2 years seizure-free.    NORMAL HEAD CT  SCAN 6/19/2019  1.  Normal exam.      BRAIN MRI 6/26/2019  1. No acute intracranial pathology.  2. Mild patchy white matter changes in the katarina which are similar to the prior study and may represent small vessel ischemic disease..     CERVICAL SPINE MRI 5/17/2018  1. Multilevel cervical spondylosis and degenerative spondylolisthesis. Moderate to advanced neural foraminal stenosis on the left at C6-7.  Moderate neural foraminal stenosis on the left at C3-4 and on the right at C4-5.  2. No significant spinal canal stenosis.  3. No cord signal abnormality.  4. Bilateral atlantooccipital assimilation.    eeg 6/27/2019  This is a normal waking and sleep EEG.      Medications     8am 10a 12p 4/5p 9/930p 10p   Carbidopa/levodopa Sinemet CR 50/200     1    Carbidopa/levodopa Sinemet 25/100 1  1 1     Levetiracetam keppra 500mg  2    2                                                  Slight slowness on walking and asymmetric reduced arm swing.     Plan:    Return to see Dr. Buck and review the keppra     [email or web address]  Introduction to parkinson videos    discussion about management of constipation    Consideration for a sleep consultation at some point about possible sleep apnea and rem sleep disorder    Skin screening examination for cancer at some point.     Pharmacy consultation with Suzan Lua, pharmacist    Big and loud, physical and speech therapy for parkinson  daughter is a dance instructor and may be helpful to share the movement therapy approaches with her.    Granted proxy access to his wife and daughter Neva    Return back in 6-9 months    Zoom visits are possible.       Coding statement:   Medical Decision Making:  #  Chronic progressive medical conditions addressed  - see above --   Review and/or interpretation of unique test or documentation from a provider outside of neurology no   Independent historian provided additional details  Yes  - spouse  Prescription drug management and review of  potential side effects and/or monitoring for side effects  -- see above ---  Health impacted by social determinants of health  no    I have reviewed the note as documented above.  This accurately captures the substance of my conversation with the patient and total time spent preparing for visit, executing visit and completing visit on the day of the visit:  60 minutes.  The portion of this total time included face to face time 3pm - 355pm    Sreedhar Kathleen MD     ______________________________________    Last visit date and details:             ______________________________________      Patient was asked about 14 Review of systems including changes in vision (dry eyes, double vision), hearing, heart, lungs, musculoskeletal, depression, anxiety, snoring, RBD, insomnia, urinary frequency, urinary urgency, constipation, swallowing problems, hematological, ID, allergies, skin problems: seborrhea, endocrinological: thyroid, diabetes, cholesterol; balance, weight changes, and other neurological problems and these were not significant at this time except for   Patient Active Problem List   Diagnosis     Paralysis agitans (H)     Parkinson's disease (H)     Anxiety state     Bilateral inguinal hernia     Chest pain     GERD (gastroesophageal reflux disease)     HTN (hypertension)     Knee pain, left     MAO (obstructive sleep apnea)     Seizure disorder (H)     Spastic dysphonia     Tachycardia     Tobacco use disorder        No Known Allergies  No past surgical history on file.  Past Medical History:   Diagnosis Date     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     Social History     Socioeconomic History     Marital status:      Spouse name: Not on file     Number of children: Not on file     Years of education: Not on file     Highest education level: Not on file   Occupational History     Not on file   Tobacco Use     Smoking status: Light Tobacco Smoker     Types: Cigars     Smokeless tobacco: Never Used   Substance and Sexual  Activity     Alcohol use: Yes     Drug use: No     Sexual activity: Yes   Other Topics Concern     Parent/sibling w/ CABG, MI or angioplasty before 65F 55M? Not Asked   Social History Narrative     Not on file     Social Determinants of Health     Financial Resource Strain: Not on file   Food Insecurity: Not on file   Transportation Needs: Not on file   Physical Activity: Not on file   Stress: Not on file   Social Connections: Not on file   Intimate Partner Violence: Not on file   Housing Stability: Not on file       Drug and lactation database from the [place] National Library of Medicine:  http://toxnet.nlm.nih.gov/cgi-bin/sis/htmlgen?LACT      B/P: Data Unavailable, T: Data Unavailable, P: Data Unavailable, R: Data Unavailable 0 lbs 0 oz  There were no vitals taken for this visit., There is no height or weight on file to calculate BMI.  Medications and Vitals not listed above were documented in the cart and reviewed by me.     Current Outpatient Medications   Medication Sig Dispense Refill     carbidopa-levodopa (SINEMET CR)  MG CR tablet Take 1 tablet by mouth At Bedtime 92 tablet 3     carbidopa-levodopa (SINEMET)  MG tablet Take 1 tablet by mouth 3 times daily 276 tablet 3     levETIRAcetam (KEPPRA) 500 MG tablet Take two tablets twice a day. 360 tablet 3         Sreedhar Dalye's goals for this visit include:   Chief Complaint   Patient presents with     Consult For     former pt of  - Jaja and Sz records in [facility] appt [facility] with wife       He requests these members of his care team be copied on today's visit information: yes    PCP: Murphy Jalloh    Referring Provider:  Abelardo Rodriguez MD  [address] [place] [id number]    BP (!) 143/81 (BP Location: Right arm, Patient Position: Sitting, Cuff Size: Adult Regular)   Pulse 76   Ht 1.829 m (6')   Wt 95.3 kg (210 lb)   BMI 28.48 kg/m      Do you need any medication refills at today's  visit? Yes  Can you prescribe meds for this patient?  Wilton Dominguez CMA    '      Again, thank you for allowing me to participate in the care of your patient.        Sincerely,        Sreedhar Kathleen MD

## 2022-05-12 ENCOUNTER — TELEPHONE (OUTPATIENT)
Dept: NEUROLOGY | Facility: CLINIC | Age: 62
End: 2022-05-12
Payer: COMMERCIAL

## 2022-05-12 NOTE — TELEPHONE ENCOUNTER
MTM referral from: [facility] visit (referral by provider)    MTM referral outreach attempt #2 on May 12, 2022 at 11:39 AM      Outcome: Patient not reachable after several attempts, will route to MT Pharmacist/Provider as an FYI.  [facility] scheduling number is [phone].  Thank you for the referral.    Nickie Thompson [facility]

## 2022-05-24 ENCOUNTER — VIRTUAL VISIT (OUTPATIENT)
Dept: PHARMACY | Facility: CLINIC | Age: 62
End: 2022-05-24
Payer: COMMERCIAL

## 2022-05-24 DIAGNOSIS — G20.A1 PARKINSON'S DISEASE (H): Primary | ICD-10-CM

## 2022-05-24 DIAGNOSIS — G40.909 SEIZURE DISORDER (H): ICD-10-CM

## 2022-05-24 PROCEDURE — 99605 MTMS BY PHARM NP 15 MIN: CPT | Performed by: PHARMACIST

## 2022-05-24 NOTE — PROGRESS NOTES
Medication Therapy Management (MTM) Encounter    ASSESSMENT:                            Medication Adherence/Access: No issues identified    Parkinson's Disease:  Stable. Discussed pros and cons of keto diet- which I do not recommend. I think the Mediterranean diet would be a better diet to start incorporating if he is motivated to change his diet.  Encouraged continuing exercise regimen.     Seizure disorder: Stable. Likely he has to remain on the anti-seizure medications since the seizures were not apparently provoked.     PLAN:                            1. I do not recommend the keto diet as it is a pretty intense diet and doesn't work for many people.  If you would like to make some dietary changes, I would suggest looking into the Mediterranean diet.     2. Continue your current medications. They seem to be working well for you!     3. Keep up your excellent exercise regimen- this is the best thing you can do for your Parkinson's, along with taking your medications.     Follow-up: 1 year or sooner if needed     SUBJECTIVE/OBJECTIVE:                          Marcus Daley is a 61 year old male called for an initial visit. He was referred to me from Dr. Kathleen.      Reason for visit: initial medication review    Allergies/ADRs: Reviewed in chart  Past Medical History: Reviewed in chart  Tobacco: He reports that he has been smoking cigars. He has never used smokeless tobacco.Tobacco Cessation Action Plan:   not discussed today  Alcohol: 1-2 beers, 3-4 nights per week    Medication Adherence/Access: no issues reported     Parkinson's Disease:  Current medications include: Carbidopa-levodopa  mg 1 tablet 3 times per day and carbidopa-levodopa  mg at night (sometimes). He is taking the medication 1 hour apart from meals. He doesn't notice a significant effect if he misses a dose but he does think the medication is working. He is not noticing any wearing off of the medication or side effects. If he has  trouble with sleep or stress than Parkinson's disease symptoms are worse but this is infrequent. Typically he is not even aware he has Parkinson's disease and he prefers it this way. He is sleeping 8-9 hours of sleep most nights and a nap for 30 minutes during the day.  He does stretching and aerobics every day, or rides a bike. He goes to [employer] 1-2 times per week. For fun, he golfs and fishes in the summer. Patient asked about keto diet for Parkinson's.     Seizure disorder: Taking Levetiracetam 1000 mg twice daily - 10:30 am and 10:30 pm. Has not had any seizures for 2.5 years now. Reports the 2 seizures he had were overnight. He is not having side effects to the medication. Reports there was no known cause of the seizures. He did have EEG at the time.     Today's Vitals: There were no vitals taken for this visit.  ----------------    I spent 23 minutes with this patient today. All changes were made via collaborative practice agreement with Dr. Kathleen. A copy of the visit note was provided to the patient's provider(s).    The patient was sent via [employer] a summary of these recommendations.     Suzan Lua, Pharm.D.  Medication Therapy Management Pharmacist  [facility]    Telemedicine Visit Details  Type of service:  Telephone visit  Start Time: 2:04 PM  End Time: 2:27 PM  Originating Location (patient location): Home  Distant Location (provider location):  [facility]     Medication Therapy Recommendations  No medication therapy recommendations to display

## 2022-05-24 NOTE — PATIENT INSTRUCTIONS
"Recommendations from today's MTM visit:                                                    MTM (medication therapy management) is a service provided by a clinical pharmacist designed to help you get the most of out of your medicines.      1. I do not recommend the keto diet as it is a pretty intense diet and doesn't work for many people.  If you would like to make some dietary changes, I would suggest looking into the Mediterranean diet.     2. Continue your current medications. They seem to be working well for you!     3. Keep up your excellent exercise regimen- this is the best thing you can do for your Parkinson's, along with taking your medications.     Follow-up: 1 year or sooner if needed     It was great speaking with you today.  I value your experience and would be very thankful for your time in providing feedback in our clinic survey. In the next few days, you may receive an email or text message from [employer] with a link to a survey related to your  clinical pharmacist.\"     To schedule another MTM appointment, please call the clinic directly or you may call the MTM scheduling line at [phone] or toll-free at [phone].     My Clinical Pharmacist's contact information:                                                      Please feel free to contact me with any questions or concerns you have.      Suzan Lua, Pharm.D.  Medication Therapy Management Pharmacist  [facility]     "

## 2022-08-08 ENCOUNTER — TELEPHONE (OUTPATIENT)
Dept: NEUROLOGY | Facility: CLINIC | Age: 62
End: 2022-08-08

## 2022-08-08 NOTE — TELEPHONE ENCOUNTER
Letter printed and physically signed by provider. Pt informed that letter will be left with MG  staff at desk C until 5pm today. If pt arrives after 5pm, he is retrieve letter at desk B and staff will have the letter for him there. Pt verbalized understanding.       Hollie Payan, RNCC  Neurology/Neurosurgery/PM&R

## 2022-08-08 NOTE — TELEPHONE ENCOUNTER
[facility] Call Center    Phone Message    May a detailed message be left on voicemail: yes     Reason for Call:   Patient called,  States that to address his diving was not signed by . Patient is requesting a signed  letter from provider and is willing to  today at [place], Please call patient back to confirm when he can  today.     Action Taken: Message routed to:  Adult Clinics: Neurology p [id number]    Travel Screening: Not Applicable

## 2022-11-02 ENCOUNTER — TELEPHONE (OUTPATIENT)
Dept: NEUROLOGY | Facility: CLINIC | Age: 62
End: 2022-11-02

## 2022-11-02 ENCOUNTER — TELEPHONE (OUTPATIENT)
Dept: FAMILY MEDICINE | Facility: CLINIC | Age: 62
End: 2022-11-02

## 2022-11-02 DIAGNOSIS — G20.A1 PARKINSON'S DISEASE (H): Primary | ICD-10-CM

## 2022-11-02 NOTE — TELEPHONE ENCOUNTER
To provider please place neurology referral as noted below if unable to fill out form    Rachel CUELLARN, RN

## 2022-11-02 NOTE — TELEPHONE ENCOUNTER
Writer called patient's PCP as the patient was being rescheduled from Dr Kathleen's medical leave.  Patient requested a letter and assessment so the patient can continue to drive.   Writer called patient PCP and spoke to Rachel MCKENZIE who will be putting in a request to Dr Jalloh for advisement on the letter to be written or for a referral to another neurologist that could accommodate the patient's request.  Nelsonr gave  Clinic number [phone] for Rachel to call back and advise.  Writer called patient and explained above and we are waiting back for advisement. Writer also told patient about [facility] and [place] clinic of Neurology and that these providers might be able to help out as scheduling permits.  Patient understood and writer ended the call.  HANH Kasper., MARCO ANTONIO ([facility])

## 2022-11-02 NOTE — TELEPHONE ENCOUNTER
Spoke with YEISON Núñez with Dr Kathleen, patient's neurologist.  Patient had an appointment coming up with Dr. Kathleen to do a driving assessment due to Parkinsons as patient's 's license is up for renewal soon  Wilton had to cancel appointment with Dr. Kathleen as now on medical leave until next April/May  Wilton did also call around to other [place] neurologists and booking out too far to assist patient    Wondering if Dr. Murphy Jalloh would be willing to fill out form for patient or refer to [facility] of Neurology or [facility] for patient to try to get seen sooner  Any questions, can call [place] at [phone], otherwise please reach out to patient on plan    Rachel SAGASTUME, RN

## 2022-11-03 NOTE — TELEPHONE ENCOUNTER
LVM to ask pateint if he wanted us to send referral to [facility] or [facility].    Daphne Dunn, Patient Representative - [facility]

## 2022-11-04 ENCOUNTER — VIRTUAL VISIT (OUTPATIENT)
Dept: NEUROLOGY | Facility: CLINIC | Age: 62
End: 2022-11-04
Payer: COMMERCIAL

## 2022-11-04 DIAGNOSIS — G40.909 SEIZURE DISORDER (H): ICD-10-CM

## 2022-11-04 DIAGNOSIS — G20.A1 PARALYSIS AGITANS (H): ICD-10-CM

## 2022-11-04 PROCEDURE — 99213 OFFICE O/P EST LOW 20 MIN: CPT | Mod: 95 | Performed by: PSYCHIATRY & NEUROLOGY

## 2022-11-04 RX ORDER — CARBIDOPA AND LEVODOPA 50; 200 MG/1; MG/1
1 TABLET, EXTENDED RELEASE ORAL AT BEDTIME
Qty: 92 TABLET | Refills: 1 | Status: SHIPPED | OUTPATIENT
Start: 2022-11-04 | End: 2023-02-27

## 2022-11-04 RX ORDER — CARBIDOPA AND LEVODOPA 25; 100 MG/1; MG/1
1 TABLET ORAL 3 TIMES DAILY
Qty: 276 TABLET | Refills: 1 | Status: SHIPPED | OUTPATIENT
Start: 2022-11-04 | End: 2023-02-27

## 2022-11-04 RX ORDER — LEVETIRACETAM 500 MG/1
TABLET ORAL
Qty: 360 TABLET | Refills: 3 | Status: SHIPPED | OUTPATIENT
Start: 2022-11-04 | End: 2023-10-27

## 2022-11-04 NOTE — LETTER
2022         RE: Marcus Daley  [address] [place] [address]        Dear Colleague,    Thank you for referring your patient, Marcus Daley, to the [facility] [place]. Please see a copy of my visit note below.     NEUROLOGY PROGRESS NOTE   [facility]    Patient:Marcus Daley  : 1960  Age: 62 year old  Today's virtual visit: 2022    History of present illness:     Marcus is participating in this virtual visit for a follow up on his epilepsy. He did not have any seizures since last visit. He is taking levetiracetam 1000 mg bid.     He was following with Dr. Rodriguez and then Dr. Kathleen for his parkinson's disease.      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  Didn't have any recent falls.   He is taking Sinemet 25/50 tid and sinemet ER 50/200 at night.       Current Outpatient Medications   Medication Sig Dispense Refill     carbidopa-levodopa (SINEMET CR)  MG CR tablet Take 1 tablet by mouth At Bedtime 92 tablet 1     carbidopa-levodopa (SINEMET)  MG tablet Take 1 tablet by mouth 3 times daily 276 tablet 1     levETIRAcetam (KEPPRA) 500 MG tablet Take two tablets twice a day. 360 tablet 3     Exam:    There were no vitals taken for this visit.     Wt Readings from Last 5 Encounters:   22 95.3 kg (210 lb)   19 95.3 kg (210 lb)   19 95.3 kg (210 lb)   19 94.6 kg (208 lb 8 oz)   19 94.5 kg (208 lb 6.4 oz)     Alert, awake, NAD, no aphasia or dysarthria, EOMI, face symmetric, no tremors in outstretched arms, normal finger tapping, moves upper extremities against gravity.     Assessment/Plan:     1. Epilepsy, nocturnal seizures x2: Seizures are controlled on levetiracetam monotherapy.  Patient denies side effects.     -Continue taking levetiracetam 1000 mg twice a day.  -Obtain levetiracetam level for efficacy and toxicity.     2.  Mild Parkinson's disease: Patient's  tremors are well controlled on current dose of levodopa- carbidopa.  He denies difficulty walking, imbalance, falls.     - Continue taking levetiracetam 1000 mg bid  - Obtain Keppra level  - Continue taking levodopa-carbidopa 25/100, 3 times a day  - Continue carbidopa/levodopa ER 50/200, 1 tablet at bedtime   - Return to clinic in 1 year.      As described above, I met with the patient for 12 minutes and during this time counseling was greater than 50% of the visit time.  Aidee Buck MD

## 2022-11-04 NOTE — TELEPHONE ENCOUNTER
[facility] Call Center    Phone Message    May a detailed message be left on voicemail: yes     Reason for Call: Medication Refill Request    Has the patient contacted the pharmacy for the refill? Yes   Name of medication being requested:  levETIRAcetam (KEPPRA) 500 MG tablet  carbidopa-levodopa (SINEMET)  MG tablet      Provider who prescribed the medication:   Pharmacy: [id number] [id number] [facility]  Date medication is needed:     Patient called concern that his medication will be be refilled, per pt there is only 1 refill left currently. Patient asking nurse to call back and confirm that medications will not be denied until he is seen in February         Action Taken: Message routed to:  Adult Clinics: Neurology p [id number]    Travel Screening: Not Applicable

## 2022-11-04 NOTE — PROGRESS NOTES
Marcus is a 62 year old who is being evaluated via a billable video visit.      How would you like to obtain your AVS? Shira  If the video visit is dropped, the invitation should be resent by: shira  Will anyone else be joining your video visit? No        Video-Visit Details    Video Start Time: 4:27    Type of service:  Video Visit    Video End Time: 4:40    Originating Location (pt. Location): Home        Distant Location (provider location):  On-site    Platform used for Video Visit: [facility]      NEUROLOGY PROGRESS NOTE   [facility]    Patient:Marcus Daley  : 1960  Age: 62 year old  Today's virtual visit: 2022    History of present illness:     Marcus is participating in this virtual visit for a follow up on his epilepsy. He did not have any seizures since last visit. He is taking levetiracetam 1000 mg bid.     He was following with Dr. Rodriguez and then Dr. Kathleen for his parkinson's disease.      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  Didn't have any recent falls.   He is taking Sinemet 25/50 tid and sinemet ER 50/200 at night.       Current Outpatient Medications   Medication Sig Dispense Refill     carbidopa-levodopa (SINEMET CR)  MG CR tablet Take 1 tablet by mouth At Bedtime 92 tablet 1     carbidopa-levodopa (SINEMET)  MG tablet Take 1 tablet by mouth 3 times daily 276 tablet 1     levETIRAcetam (KEPPRA) 500 MG tablet Take two tablets twice a day. 360 tablet 3     Exam:    There were no vitals taken for this visit.     Wt Readings from Last 5 Encounters:   22 95.3 kg (210 lb)   19 95.3 kg (210 lb)   19 95.3 kg (210 lb)   19 94.6 kg (208 lb 8 oz)   19 94.5 kg (208 lb 6.4 oz)     Alert, awake, NAD, no aphasia or dysarthria, EOMI, face symmetric, no tremors in outstretched arms, normal finger tapping, moves upper extremities against gravity.     Assessment/Plan:     1. Epilepsy, nocturnal seizures x2:  Seizures are controlled on levetiracetam monotherapy.  Patient denies side effects.     -Continue taking levetiracetam 1000 mg twice a day.  -Obtain levetiracetam level for efficacy and toxicity.     2.  Mild Parkinson's disease: Patient's tremors are well controlled on current dose of levodopa- carbidopa.  He denies difficulty walking, imbalance, falls.     - Continue taking levetiracetam 1000 mg bid  - Obtain Keppra level  - Continue taking levodopa-carbidopa 25/100, 3 times a day  - Continue carbidopa/levodopa ER 50/200, 1 tablet at bedtime   - Return to clinic in 1 year.      As described above, I met with the patient for 12 minutes and during this time counseling was greater than 50% of the visit time.  Aidee Buck MD

## 2022-11-04 NOTE — TELEPHONE ENCOUNTER
The pt was scheduled for an appt with Dr Cruz today at 4:30pm to refill the Keppra.   Both long acting and immediate release Carb/Levo prescriptions were authorized per medication refill protocol.     Rachel Roche RN Care Coordinator   Neurology/Neurosurgery/PM&R/ Pain Management

## 2022-11-07 NOTE — TELEPHONE ENCOUNTER
Looks like pt was seen. Closing this encounter.    Daphne Dunn, Patient Representative - [facility]

## 2022-11-07 NOTE — TELEPHONE ENCOUNTER
YEISON Núñez with patient's Neurologist office Dr. Kathleen, calling for an update about forms. RN was informed that patient had to have his appointments with Dr. Kathleen canceled due to unforseen circumstances and is in need of finding a new provider to assist in assessing patient and filling out forms. Call was disconnected at that time.     RN called neurology clinic number and spoke with YEISON Delgado. RN informed Sandy that Patient was left a message from PCP's team to determine if he would like to move forward with a referral to a new neurologist or to see Tana. Sandy verbalized understanding and states she will inform [place].     No further questions or concerns at this time.     Aleyda Patiño RN    [facility]

## 2022-11-07 NOTE — TELEPHONE ENCOUNTER
Writer called [facility] [phone] and spoke to Aleyda, MAGALY triage, [place] ,  concerning a request for paperwork to be possibly signed by Dr Jalloh, if appropriate.  Aleyda told writer that patient would be called and consulted on paperwork needing signature  if not appropriate for Dr Jalloh to sign driving assessment. Patient would be directed to Tana for follow up with assessment and paperwork due to Dr Kathleen being out on medical leave.  Part of this message was taken by RASHAWN, coworker due to computer failure. Message given to writer in phone call about a call back due to [facility].Aleyda inquired about a call back and writer explained it would not be necessary as a high priority note was sent to Dr Jalloh and the patient would be called.  Writer documented the conversation.  KENNETH Kasper, MARCO ANTONIO ([facility])    KENNETH Kasper, MARCO ANTONIO ([facility])

## 2022-11-07 NOTE — TELEPHONE ENCOUNTER
Pt seen by Dr. Cruz on 11/4/22 and Keppra refilled.     Hollie Payan, RNCC  Neurology/Neurosurgery/PM&R

## 2022-11-15 ENCOUNTER — LAB (OUTPATIENT)
Dept: LAB | Facility: CLINIC | Age: 62
End: 2022-11-15
Payer: COMMERCIAL

## 2022-11-15 DIAGNOSIS — G40.909 SEIZURE DISORDER (H): ICD-10-CM

## 2022-11-15 LAB — LEVETIRACETAM SERPL-MCNC: 24.7 ΜG/ML (ref 10–40)

## 2022-11-15 PROCEDURE — 80177 DRUG SCRN QUAN LEVETIRACETAM: CPT

## 2022-11-15 PROCEDURE — 36415 COLL VENOUS BLD VENIPUNCTURE: CPT

## 2022-11-21 ENCOUNTER — HEALTH MAINTENANCE LETTER (OUTPATIENT)
Age: 62
End: 2022-11-21

## 2023-02-01 ENCOUNTER — OFFICE VISIT (OUTPATIENT)
Dept: URGENT CARE | Facility: URGENT CARE | Age: 63
End: 2023-02-01
Payer: COMMERCIAL

## 2023-02-01 VITALS
HEART RATE: 78 BPM | WEIGHT: 206 LBS | SYSTOLIC BLOOD PRESSURE: 149 MMHG | OXYGEN SATURATION: 97 % | BODY MASS INDEX: 27.94 KG/M2 | TEMPERATURE: 97.3 F | DIASTOLIC BLOOD PRESSURE: 91 MMHG | RESPIRATION RATE: 16 BRPM

## 2023-02-01 DIAGNOSIS — L02.92 BOIL: Primary | ICD-10-CM

## 2023-02-01 PROCEDURE — 99203 OFFICE O/P NEW LOW 30 MIN: CPT | Mod: 25 | Performed by: PHYSICIAN ASSISTANT

## 2023-02-01 PROCEDURE — 10060 I&D ABSCESS SIMPLE/SINGLE: CPT | Performed by: PHYSICIAN ASSISTANT

## 2023-02-01 ASSESSMENT — ENCOUNTER SYMPTOMS
HEADACHES: 0
COUGH: 0
GASTROINTESTINAL NEGATIVE: 1
NAUSEA: 0
DYSURIA: 0
PALPITATIONS: 0
CONSTITUTIONAL NEGATIVE: 1
SORE THROAT: 0
NEUROLOGICAL NEGATIVE: 1
WHEEZING: 0
COLOR CHANGE: 1
FEVER: 0
SHORTNESS OF BREATH: 0
CHEST TIGHTNESS: 0
ALLERGIC/IMMUNOLOGIC NEGATIVE: 1
ABDOMINAL PAIN: 0
MUSCULOSKELETAL NEGATIVE: 1
VOMITING: 0
FREQUENCY: 0
HEMATURIA: 0
CARDIOVASCULAR NEGATIVE: 1
MYALGIAS: 0
CHILLS: 0
RESPIRATORY NEGATIVE: 1
DIARRHEA: 0

## 2023-02-01 NOTE — PROGRESS NOTES
Chief Complaint:    Chief Complaint   Patient presents with     Urgent Care     Cyst     Per patient states he has a cyst on the middle of back, states he has had it removed once on a different part of back years ago but now this one is painful and grown in size          Medical Decision Making:    Vital signs reviewed by Surinder Rosado PA-C  BP (!) 149/91   Pulse 78   Temp 97.3  F (36.3  C) (Tympanic)   Resp 16   Wt 93.4 kg (206 lb)   SpO2 97%   BMI 27.94 kg/m      Differential Diagnosis:  Boil, cellulitis, folliculitis.     ASSESSMENT:     1. Boil           PLAN:     I&D    Verbal consent was obtained.  The area was cleaned with alcohol and anesthetized with 1% lidocaine.  Number 11 blade was used to make a small incision.  Gentle pressure and forceps used to drain white/brown purulent material.  Sterile dressing applied.  Patient tolerated this procedure well.  Rx for Augmentin sent in.  Patient instructed to follow up with PCP in 1 week if symptoms are not improving.  Sooner if symptoms worsen.  Worrisome symptoms discussed with instructions to go to the ED.  Patient verbalized understanding and agreed with this plan.    Labs:     No results found for any visits on 02/01/23.    Current Meds:    Current Outpatient Medications:      amoxicillin-clavulanate (AUGMENTIN) 875-125 MG tablet, Take 1 tablet by mouth 2 times daily for 7 days, Disp: 14 tablet, Rfl: 0     carbidopa-levodopa (SINEMET CR)  MG CR tablet, Take 1 tablet by mouth At Bedtime, Disp: 92 tablet, Rfl: 1     carbidopa-levodopa (SINEMET)  MG tablet, Take 1 tablet by mouth 3 times daily, Disp: 276 tablet, Rfl: 1     levETIRAcetam (KEPPRA) 500 MG tablet, Take two tablets twice a day., Disp: 360 tablet, Rfl: 3    Allergies:  No Known Allergies    SUBJECTIVE    HPI: Marcus Daley is an 62 year old male who presents for evaluation and treatment of possible boil on his back.  Patient noticed lump on the back 2 days ago.  The area has  gotten larger and is now painful.  His wife did squeeze it and got some white purulent material out.  No fever, chills, nausea or vomiting.      ROS:      Review of Systems   Constitutional: Negative.  Negative for chills and fever.   HENT: Negative.  Negative for sore throat.    Respiratory: Negative.  Negative for cough, chest tightness, shortness of breath and wheezing.    Cardiovascular: Negative.  Negative for chest pain and palpitations.   Gastrointestinal: Negative.  Negative for abdominal pain, diarrhea, nausea and vomiting.   Genitourinary: Negative for dysuria, frequency, hematuria and urgency.   Musculoskeletal: Negative.  Negative for myalgias.   Skin: Positive for color change. Negative for rash.   Allergic/Immunologic: Negative.  Negative for immunocompromised state.   Neurological: Negative.  Negative for headaches.        Family History   Family History   Problem Relation Age of Onset     Suicide Mother      Depression Mother      Other - See Comments Mother      Hypertension Father      Heart Disease Father      Other - See Comments Brother         fell 12 feet and hit head on pavement     Other - See Comments Brother         lives [facility]     Lung Cancer Brother         small cell lung cancer     Parkinsonism Paternal Cousin      Other - See Comments Daughter         lives in [place]       Social History  Social History     Socioeconomic History     Marital status:      Spouse name: Not on file     Number of children: Not on file     Years of education: Not on file     Highest education level: Not on file   Occupational History     Not on file   Tobacco Use     Smoking status: Light Smoker     Types: Cigars     Smokeless tobacco: Never     Tobacco comments:     cigars   Substance and Sexual Activity     Alcohol use: Yes     Drug use: No     Sexual activity: Yes   Other Topics Concern     Parent/sibling w/ CABG, MI or angioplasty before 65F 55M? Not Asked   Social History Narrative     Copier/field service repair/computer repair for 20-30 years        Daughter lives in [place]         [place]onel retired wife retired  for [employer]        Lives in [place]         Social Determinants of Health     Financial Resource Strain: Not on file   Food Insecurity: Not on file   Transportation Needs: Not on file   Physical Activity: Not on file   Stress: Not on file   Social Connections: Not on file   Intimate Partner Violence: Not on file   Housing Stability: Not on file        Surgical History:  Past Surgical History:   Procedure Laterality Date     COLONOSCOPY       TONSILLECTOMY      age 2-3 years        Problem List:  Patient Active Problem List   Diagnosis     Paralysis agitans (H)     Parkinson's disease (H)     Anxiety state     Bilateral inguinal hernia     Chest pain     GERD (gastroesophageal reflux disease)     HTN (hypertension)     Knee pain, left     MAO (obstructive sleep apnea)     Seizure disorder (H)     Spastic dysphonia     Tachycardia     Tobacco use disorder           OBJECTIVE:     Vital signs noted and reviewed by Surinder Rosado PA-C  BP (!) 149/91   Pulse 78   Temp 97.3  F (36.3  C) (Tympanic)   Resp 16   Wt 93.4 kg (206 lb)   SpO2 97%   BMI 27.94 kg/m       PEFR:    Physical Exam  Vitals and nursing note reviewed.   Constitutional:       General: He is not in acute distress.     Appearance: He is well-developed. He is not ill-appearing, toxic-appearing or diaphoretic.   HENT:      Head: Normocephalic and atraumatic.      Right Ear: Hearing, tympanic membrane, ear canal and external ear normal. Tympanic membrane is not perforated, erythematous, retracted or bulging.      Left Ear: Hearing, tympanic membrane, ear canal and external ear normal. Tympanic membrane is not perforated, erythematous, retracted or bulging.      Nose: Nose normal. No mucosal edema, congestion or rhinorrhea.      Mouth/Throat:      Pharynx: No oropharyngeal exudate or  posterior oropharyngeal erythema.      Tonsils: No tonsillar exudate or tonsillar abscesses. 0 on the right. 0 on the left.   Eyes:      Pupils: Pupils are equal, round, and reactive to light.   Cardiovascular:      Rate and Rhythm: Normal rate and regular rhythm.      Heart sounds: Normal heart sounds, S1 normal and S2 normal. Heart sounds not distant. No murmur heard.    No friction rub. No gallop.   Pulmonary:      Effort: Pulmonary effort is normal. No respiratory distress.      Breath sounds: Normal breath sounds. No decreased breath sounds, wheezing, rhonchi or rales.   Abdominal:      General: Bowel sounds are normal. There is no distension.      Palpations: Abdomen is soft.      Tenderness: There is no abdominal tenderness.   Musculoskeletal:      Cervical back: Normal range of motion and neck supple.   Lymphadenopathy:      Cervical: No cervical adenopathy.   Skin:     General: Skin is warm and dry.      Findings: No rash.             Comments: Roughly 2 cm in diameter boil on the middle back.     Neurological:      Mental Status: He is alert.      Cranial Nerves: No cranial nerve deficit.   Psychiatric:         Attention and Perception: He is attentive.         Speech: Speech normal.         Behavior: Behavior normal. Behavior is cooperative.         Thought Content: Thought content normal.         Judgment: Judgment normal.             Surinder Rosado PA-C  2/1/2023, 10:53 AM

## 2023-02-02 DIAGNOSIS — Z12.11 SPECIAL SCREENING FOR MALIGNANT NEOPLASMS, COLON: Primary | ICD-10-CM

## 2023-02-08 ENCOUNTER — TELEPHONE (OUTPATIENT)
Dept: GASTROENTEROLOGY | Facility: CLINIC | Age: 63
End: 2023-02-08

## 2023-02-08 NOTE — TELEPHONE ENCOUNTER
Screening Questions  BLUE  KIND OF PREP RED  LOCATION [review exclusion criteria] GREEN  SEDATION TYPE        Y Are you active on mychart?         ALLEN WALTER MD Ordering/Referring Provider?        [employer] What type of coverage do you have?      N Have you had a positive covid test in the last 14 days?     27.9 1. BMI  [BMI 40+ - review exclusion criteria]    Y  2. Are you able to give consent for your medical care? [IF NO,RN REVIEW]          N  3. Are you taking any prescription pain medications on a routine schedule   (ex narcotics: oxycodone, roxicodone, oxycontin,  and percocet)? [RN Review]        N  3a. EXTENDED PREP What kind of prescription?     N 4. Do you have any chemical dependencies such as alcohol, street drugs, or methadone?        **If yes 3- 5 , please schedule with MAC sedation.**          IF YES TO ANY 6 - 10 - HOSPITAL SETTING ONLY.     N 6.   Do you need assistance transferring?     N 7.   Have you had a heart or lung transplant?    N 8.   Are you currently on dialysis?   N 9.   Do you use daily home oxygen?   N 10. Do you take nitroglycerin?   10a. N If yes, how often?     11. [FEMALES]  N Are you currently pregnant?    11a. N If yes, how many weeks? [ Greater than 12 weeks, OR NEEDED]    N 12. Do you have Pulmonary Hypertension? *NEED PAC APPT AT [facility] w/ [facility]*     N 13. [review exclusion criteria]  Do you have any implantable devices in your body (pacemaker, defib, LVAD)?    N 14. In the past 6 months, have you had any heart related issues including cardiomyopathy or heart attack?     14a. N If yes, did it require cardiac stenting if so when?     N 15. Have you had a stroke or Transient ischemic attack (TIA - aka  mini stroke ) within 6 months?      N 16. Do you have mod to severe Obstructive Sleep Apnea?  [Hospital only]    N 17. Do you have SEVERE AND UNCONTROLLED asthma? *NEED PAC APPT AT [facility] w/MAC*     N 18. Are you currently taking any blood thinners?     18a. If yes, inform patient  "to \"follow up w/ ordering provider for bridging instructions.\"    N 19. Do you take the medication Phentermine?    19a. If yes, \"Hold for 7 days before procedure.  Please consult your prescribing provider if you have questions about holding this medication.\"     N  20. Do you have chronic kidney disease?      N  21. Do you have a diagnosis of diabetes?     N  22. On a regular basis do you go 3-5 days between bowel movements?     N 23. Preferred LOCAL Pharmacy for Pre Prescription    [ LIST ONLY ONE PHARMACY]          [id number] [address] [facility]        - CLOSING REMINDERS -    Informed patient they will need an adult    Cannot take any type of public or medical transportation alone    Conscious Sedation- Needs  for 6 hours after the procedure       MAC/General-Needs  for 24 hours after procedure    Pre-Procedure Covid test to be completed [[facility] PCR Testing Required]    Confirmed Nurse will call to complete assessment       - SCHEDULING DETAILS -  N Hospital Setting Required? If yes, what is the exclusion?: N   WAN  Surgeon    4/4/23  Date of Procedure  Lower Endoscopy [Colonoscopy]  Type of Procedure Scheduled  [facility]   STANDARD [facility]-If you answer yes to questions #8, #20, #21Which Colonoscopy Prep was Sent?     MODERATE Sedation Type     N PAC / Pre-op Required                 "

## 2023-02-16 NOTE — PROGRESS NOTES
VIDEO VISIT    Date of Visit: February 27, 2023  Name: Marcus Daley  Date of Birth 1960  [address] [place] [address] [phone] (M)   [phone] (H)   [phone] (W)      [email or web address]  Ronel Daley  [phone]     [email or web address] [email or web address]    Granted proxy access to his wife and daughter Neva     Assessment:  (G20) Paralysis agitans (H)  (primary encounter diagnosis)     FROM ANA 12/2021  We saw the patient in 2018 for mild hand tremor.  He also had a history of REM behavior disorder.  We started him on carbidopa levodopa and he has had an excellent response.  He really has had no change over the past 3 years.  He continues to have an excellent response to low-dose carbidopa levodopa 25 101 tablet 3 times a day.  He has virtually no tremor and no signs of progression.  He is happy with his Parkinson's response.  He has had a first cousin who has developed Parkinson's disease but no first-degree relatives.     VOICE CHANGES 2010 dysphonia  RIGHT ARM CHANGES NOTICED 2017 January 2018 CHANGES IN HANDWRITING  6/2018 DIAGNOSIS PARKINSON WITH DR PEREZ - HAD SEEN GENESIS PREVIOUSLY 5/2018     Review of diagnosis    Parkinson's disease      Avoidance of dopamine blockers   Not taking     Motor complication review   No clear wearing off and doing well.      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 years - [employer]  Tries to do daily stretches and aerobics  Has not done big and loud therapy  Keeping active - tries to go in 2/week  And does aerobics and stationary bike     Cognitive/Driving   Driving   DOT physical October 2021 and will need another evaluation October 2022 and will have this done at [facility]      Mood   Will start golfing again  Mood down  this winter  Sun room     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 instruction    No travel plans     Hallucinations/delusions   No shadows     Sleep   Goes to sleep 1130pm  Pretty quickly falls asleep  Snoring  Not clearly talking  Had one rare episode of physical movement  He punched through a pillow one occasion - RBD  Consider sleep consultation  May wake up to go the bathroom at 3am and usually can go back to sleep   Wakes up for the day at 8 or 830am   He is not taking melatonin 3-5mg and titrated upwards for rem sleep disorder     Bladder/Renal/Prostate/Gyn/Other   age related issues  Nocturia 1/noc  No clear urgency or frequency  Some dribbling     GI/Constipation/GERD   Constipation - occasional laxative  Bowel movements are daily or other day  discussion about management of constipation     ENDO/Lipid/DM/Bone density/Thyroid  denies     Cardio/heart/Hyper or Hypotensive   143/81 -   Denies blood pressure problems     Vision/Dry Eyes/Cataracts/Glaucoma/Macular   Denies eye problems     Heme/Anticoagulation/Antiplatelet/Anemia/Other  Not taking an aspirin     ENT/Resp  ANOSMIA/hyposmia  Dysphonia  Muscle tension dysphonia      Skin/Cancer/Seborrhea/other  Denies cancer   Skin screening examination for cancer at some point.      Musculoskeletal/Pain/Headache  Upper spine - stiff neck - manageable with heat or ice     Other:  SEIZURE free since November 2019     Seizure disorder.  The patient had 2 unprovoked seizures at nighttime.  He would scream and thrash and this was observed by his wife.  We started him on levetiracetam and escalated the dose to 1000 mg twice daily.  The last seizure was on 11/1/2019.  He has had no seizures since.  We permitted driving after 6 months of medical compliance and seizure-free interval.  He now wonders about getting a commercial license.  He is looked into it and if we write that he has a seizure disorder and not use the term epilepsy this may qualify him as he is 2 years seizure-free.     NORMAL  HEAD CT SCAN 6/19/2019  1.  Normal exam.       BRAIN MRI 6/26/2019  1. No acute intracranial pathology.  2. Mild patchy white matter changes in the katarina which are similar to the prior study and may represent small vessel ischemic disease..      CERVICAL SPINE MRI 5/17/2018  1. Multilevel cervical spondylosis and degenerative spondylolisthesis. Moderate to advanced neural foraminal stenosis on the left at C6-7.  Moderate neural foraminal stenosis on the left at C3-4 and on the right at C4-5.  2. No significant spinal canal stenosis.  3. No cord signal abnormality.  4. Bilateral atlantooccipital assimilation.     eeg 6/27/2019  This is a normal waking and sleep EEG.    Return to see Dr. Buck and review the keppra         Medications     8am 10a 12p 4/5p 9/930p 10p   Carbidopa/levodopa Sinemet CR 50/200         1     Carbidopa/levodopa Sinemet 25/100 1   1 1       Levetiracetam keppra 500mg   2       2                                                                                      Slight slowness on walking and asymmetric reduced arm swing.      Plan:    SUSI Buck visit on 11/4/2022  Continuing on keppra and his level was 24.7 (10-40) on 11/15/2022  Taking levetiracetam keppra 500mg 2 tabs twice daily     Discussed medicare part D and medication coverage.     Discussed the medication schedule     Return 6 months.     Medical Decision Making:  #  Chronic progressive medical conditions addressed  Seizures, parkinson  Review and/or interpretation of unique test or documentation from a provider outside of neurology no   Independent historian provided additional details  yes   Prescription drug management and review of potential side effects and/or monitoring for side effects  yes   Health impacted by social determinants of health  no    I have reviewed the note as documented above.  This accurately captures the substance of my conversation with the patient and total time spent preparing for visit,  "executing visit and completing visit on the day of the visit:  30 minutes.     Sreedhar Kathleen MD      ------------------------------------------------------------------------------------------------------------------------------------------------------------------------    Video-Visit Details    The patient has been notified of following:     \"After a review of the patient s situation, this visit was changed from an in-person visit to a video visit to reduce the risk of COVID-19 exposure.   The patient is being evaluated via a billable video visit.\"    \"This video visit will be conducted via a call between you and your physician/provider. We have found that certain health care needs can be provided without the need for an in-person physical exam.  This service lets us provide the care you need with a video conversation.  If a prescription is necessary we can send it directly to your pharmacy.  If lab work is needed we can place an order for that and you can then stop by our lab to have the test done at a later time.    If during the course of the call the physician/provider feels a video visit is not appropriate, you will not be charged for this service.\"     Patient has given verbal consent for Video visit? Yes    Patient would like the video invitation sent by:     Type of service:  Video Visit    Video Start Time:     Video End Time (time video stopped):     Duration:  minutes - see above    Originating Location (pt. Location):     Distant Location (provider location):  [facility] NEUROLOGY [facility]     Mode of Communication:  Video Conference via [employer] (and if not possible then doximity)      Sreedhar Kathleen MD      --------------------------------------------------------------------------------------------------------------    Marcus JLUIS Daley is a 62 year old male who is being evaluated via a billable video visit.      Charts reviewed  Consult from  Images reviewed        I have reviewed and " updated the patient's Past Medical History, Social History, Family History and Medication List.    ALLERGIES  Patient has no known allergies.    Lasts visit details if there was a last visit:       14 Review of systems  are negative except for   Patient Active Problem List   Diagnosis     Paralysis agitans (H)     Parkinson's disease (H)     Bilateral inguinal hernia     GERD (gastroesophageal reflux disease)     HTN (hypertension)     MAO (obstructive sleep apnea)     Seizure disorder (H)     Spastic dysphonia     Tachycardia     Tobacco use disorder      No Known Allergies  Past Surgical History:   Procedure Laterality Date     COLONOSCOPY       TONSILLECTOMY      age 2-3 years     Past Medical History:   Diagnosis Date     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     Social History     Socioeconomic History     Marital status:      Spouse name: Not on file     Number of children: Not on file     Years of education: Not on file     Highest education level: Not on file   Occupational History     Not on file   Tobacco Use     Smoking status: Light Smoker     Types: Cigars     Smokeless tobacco: Never     Tobacco comments:     cigars   Substance and Sexual Activity     Alcohol use: Yes     Drug use: No     Sexual activity: Yes   Other Topics Concern     Parent/sibling w/ CABG, MI or angioplasty before 65F 55M? Not Asked   Social History Narrative    Copier/field service repair/computer repair for 20-30 years        Daughter lives in [place]         Ronel retired wife retired  for [email or web address]        Lives in [place]         Social Determinants of Health     Financial Resource Strain: Not on file   Food Insecurity: Not on file   Transportation Needs: Not on file   Physical Activity: Not on file   Stress: Not on file   Social Connections: Not on file   Intimate Partner Violence: Not on file   Housing Stability: Not on file     Family History   Problem Relation Age of Onset     Suicide Mother       Depression Mother      Other - See Comments Mother      Hypertension Father      Heart Disease Father      Other - See Comments Brother         fell 12 feet and hit head on pavement     Other - See Comments Brother         lives [facility]     Lung Cancer Brother         small cell lung cancer     Parkinsonism Paternal Cousin      Other - See Comments Daughter         lives in [place]     Current Outpatient Medications   Medication Sig Dispense Refill     carbidopa-levodopa (SINEMET CR)  MG CR tablet Take 1 tablet by mouth At Bedtime 92 tablet 1     carbidopa-levodopa (SINEMET)  MG tablet Take 1 tablet by mouth 3 times daily 276 tablet 1     levETIRAcetam (KEPPRA) 500 MG tablet Take two tablets twice a day. 360 tablet 3

## 2023-02-22 ENCOUNTER — TELEPHONE (OUTPATIENT)
Dept: GASTROENTEROLOGY | Facility: CLINIC | Age: 63
End: 2023-02-22

## 2023-02-27 ENCOUNTER — VIRTUAL VISIT (OUTPATIENT)
Dept: NEUROLOGY | Facility: CLINIC | Age: 63
End: 2023-02-27
Payer: COMMERCIAL

## 2023-02-27 DIAGNOSIS — G20.A1 PARKINSON'S DISEASE (H): Primary | ICD-10-CM

## 2023-02-27 DIAGNOSIS — G20.A1 PARALYSIS AGITANS (H): ICD-10-CM

## 2023-02-27 PROCEDURE — 99214 OFFICE O/P EST MOD 30 MIN: CPT | Mod: VID | Performed by: PSYCHIATRY & NEUROLOGY

## 2023-02-27 RX ORDER — CARBIDOPA AND LEVODOPA 50; 200 MG/1; MG/1
1 TABLET, EXTENDED RELEASE ORAL AT BEDTIME
Qty: 92 TABLET | Refills: 3 | Status: SHIPPED | OUTPATIENT
Start: 2023-02-27 | End: 2023-08-21

## 2023-02-27 RX ORDER — CARBIDOPA AND LEVODOPA 25; 100 MG/1; MG/1
1 TABLET ORAL 3 TIMES DAILY
Qty: 276 TABLET | Refills: 3 | Status: SHIPPED | OUTPATIENT
Start: 2023-02-27 | End: 2023-08-21

## 2023-02-27 NOTE — LETTER
2/27/2023         RE: Marcus Daley  [address] [place] [address]        Dear Colleague,    Thank you for referring your patient, Marcus Daley, to the [facility] [place]. Please see a copy of my visit note below.        VIDEO VISIT    Date of Visit: February 27, 2023  Name: Marcus Daley  Date of Birth 1960  [address] [place] [address] [phone] (M)   [phone] (H)   [phone] (W)      [email or web address]  Ronel Thuy  [phone]     neva [email or web address]    Granted proxy access to his wife and daughter Neva     Assessment:  (G20) Paralysis agitans (H)  (primary encounter diagnosis)     FROM ANA 12/2021  We saw the patient in 2018 for mild hand tremor.  He also had a history of REM behavior disorder.  We started him on carbidopa levodopa and he has had an excellent response.  He really has had no change over the past 3 years.  He continues to have an excellent response to low-dose carbidopa levodopa 25 101 tablet 3 times a day.  He has virtually no tremor and no signs of progression.  He is happy with his Parkinson's response.  He has had a first cousin who has developed Parkinson's disease but no first-degree relatives.     VOICE CHANGES 2010 dysphonia  RIGHT ARM CHANGES NOTICED 2017 January 2018 CHANGES IN HANDWRITING  6/2018 DIAGNOSIS PARKINSON WITH DR PEREZ - HAD SEEN GENESIS PREVIOUSLY 5/2018     Review of diagnosis    Parkinson's disease      Avoidance of dopamine blockers   Not taking     Motor complication review   No clear wearing off and doing well.      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 years - [employer]  Tries to do daily stretches and aerobics  Has not done big and loud therapy  Keeping  active - tries to go in 2/week  And does aerobics and stationary bike     Cognitive/Driving   Driving   DOT physical October 2021 and will need another evaluation October 2022 and will have this done at [facility]      Mood   Will start golfing again  Mood down this winter  Sun room     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 instruction    No travel plans     Hallucinations/delusions   No shadows     Sleep   Goes to sleep 1130pm  Pretty quickly falls asleep  Snoring  Not clearly talking  Had one rare episode of physical movement  He punched through a pillow one occasion - RBD  Consider sleep consultation  May wake up to go the bathroom at 3am and usually can go back to sleep   Wakes up for the day at 8 or 830am   He is not taking melatonin 3-5mg and titrated upwards for rem sleep disorder     Bladder/Renal/Prostate/Gyn/Other   age related issues  Nocturia 1/noc  No clear urgency or frequency  Some dribbling     GI/Constipation/GERD   Constipation - occasional laxative  Bowel movements are daily or other day  discussion about management of constipation     ENDO/Lipid/DM/Bone density/Thyroid  denies     Cardio/heart/Hyper or Hypotensive   143/81 -   Denies blood pressure problems     Vision/Dry Eyes/Cataracts/Glaucoma/Macular   Denies eye problems     Heme/Anticoagulation/Antiplatelet/Anemia/Other  Not taking an aspirin     ENT/Resp  ANOSMIA/hyposmia  Dysphonia  Muscle tension dysphonia      Skin/Cancer/Seborrhea/other  Denies cancer   Skin screening examination for cancer at some point.      Musculoskeletal/Pain/Headache  Upper spine - stiff neck - manageable with heat or ice     Other:  SEIZURE free since November 2019     Seizure disorder.  The patient had 2 unprovoked seizures at nighttime.  He would scream and thrash and this was observed by his wife.  We started him on levetiracetam and escalated the dose to 1000 mg twice daily.  The last seizure was on 11/1/2019.  He has had no seizures since.  We permitted driving after 6  months of medical compliance and seizure-free interval.  He now wonders about getting a commercial license.  He is looked into it and if we write that he has a seizure disorder and not use the term epilepsy this may qualify him as he is 2 years seizure-free.     NORMAL HEAD CT SCAN 6/19/2019  1.  Normal exam.       BRAIN MRI 6/26/2019  1. No acute intracranial pathology.  2. Mild patchy white matter changes in the katarina which are similar to the prior study and may represent small vessel ischemic disease..      CERVICAL SPINE MRI 5/17/2018  1. Multilevel cervical spondylosis and degenerative spondylolisthesis. Moderate to advanced neural foraminal stenosis on the left at C6-7.  Moderate neural foraminal stenosis on the left at C3-4 and on the right at C4-5.  2. No significant spinal canal stenosis.  3. No cord signal abnormality.  4. Bilateral atlantooccipital assimilation.     eeg 6/27/2019  This is a normal waking and sleep EEG.    Return to see Dr. Buck and review the keppra         Medications     8am 10a 12p 4/5p 9/930p 10p   Carbidopa/levodopa Sinemet CR 50/200         1     Carbidopa/levodopa Sinemet 25/100 1   1 1       Levetiracetam keppra 500mg   2       2                                                                                      Slight slowness on walking and asymmetric reduced arm swing.      Plan:    SUSI Buck visit on 11/4/2022  Continuing on keppra and his level was 24.7 (10-40) on 11/15/2022  Taking levetiracetam keppra 500mg 2 tabs twice daily     Discussed medicare part D and medication coverage.     Discussed the medication schedule     Return 6 months.     Medical Decision Making:  #  Chronic progressive medical conditions addressed  Seizures, parkinson  Review and/or interpretation of unique test or documentation from a provider outside of neurology no   Independent historian provided additional details  yes   Prescription drug management and review of potential side  "effects and/or monitoring for side effects  yes   Health impacted by social determinants of health  no    I have reviewed the note as documented above.  This accurately captures the substance of my conversation with the patient and total time spent preparing for visit, executing visit and completing visit on the day of the visit:  30 minutes.     Sreedhar Kathleen MD      ------------------------------------------------------------------------------------------------------------------------------------------------------------------------    Video-Visit Details    The patient has been notified of following:     \"After a review of the patient s situation, this visit was changed from an in-person visit to a video visit to reduce the risk of COVID-19 exposure.   The patient is being evaluated via a billable video visit.\"    \"This video visit will be conducted via a call between you and your physician/provider. We have found that certain health care needs can be provided without the need for an in-person physical exam.  This service lets us provide the care you need with a video conversation.  If a prescription is necessary we can send it directly to your pharmacy.  If lab work is needed we can place an order for that and you can then stop by our lab to have the test done at a later time.    If during the course of the call the physician/provider feels a video visit is not appropriate, you will not be charged for this service.\"     Patient has given verbal consent for Video visit? Yes    Patient would like the video invitation sent by:     Type of service:  Video Visit    Video Start Time:     Video End Time (time video stopped):     Duration:  minutes - see above    Originating Location (pt. Location):     Distant Location (provider location):  [facility] NEUROLOGY [facility]     Mode of Communication:  Video Conference via [employer] (and if not possible then doximity)      Sreedhar Kathleen" MD      --------------------------------------------------------------------------------------------------------------    Marcus Daley is a 62 year old male who is being evaluated via a billable video visit.      Charts reviewed  Consult from  Images reviewed        I have reviewed and updated the patient's Past Medical History, Social History, Family History and Medication List.    ALLERGIES  Patient has no known allergies.    Lasts visit details if there was a last visit:       14 Review of systems  are negative except for   Patient Active Problem List   Diagnosis     Paralysis agitans (H)     Parkinson's disease (H)     Bilateral inguinal hernia     GERD (gastroesophageal reflux disease)     HTN (hypertension)     MAO (obstructive sleep apnea)     Seizure disorder (H)     Spastic dysphonia     Tachycardia     Tobacco use disorder      No Known Allergies  Past Surgical History:   Procedure Laterality Date     COLONOSCOPY       TONSILLECTOMY      age 2-3 years     Past Medical History:   Diagnosis Date     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     Social History     Socioeconomic History     Marital status:      Spouse name: Not on file     Number of children: Not on file     Years of education: Not on file     Highest education level: Not on file   Occupational History     Not on file   Tobacco Use     Smoking status: Light Smoker     Types: Cigars     Smokeless tobacco: Never     Tobacco comments:     cigars   Substance and Sexual Activity     Alcohol use: Yes     Drug use: No     Sexual activity: Yes   Other Topics Concern     Parent/sibling w/ CABG, MI or angioplasty before 65F 55M? Not Asked   Social History Narrative    Copier/field service repair/computer repair for 20-30 years        Daughter lives in [place]         Ronel retired wife retired  for [email or web address]        Lives in [place]         Social Avogy of Health     Financial Resource Strain: Not on file   Food  Insecurity: Not on file   Transportation Needs: Not on file   Physical Activity: Not on file   Stress: Not on file   Social Connections: Not on file   Intimate Partner Violence: Not on file   Housing Stability: Not on file     Family History   Problem Relation Age of Onset     Suicide Mother      Depression Mother      Other - See Comments Mother      Hypertension Father      Heart Disease Father      Other - See Comments Brother         fell 12 feet and hit head on pavement     Other - See Comments Brother         lives [facility]     Lung Cancer Brother         small cell lung cancer     Parkinsonism Paternal Cousin      Other - See Comments Daughter         lives in [place]     Current Outpatient Medications   Medication Sig Dispense Refill     carbidopa-levodopa (SINEMET CR)  MG CR tablet Take 1 tablet by mouth At Bedtime 92 tablet 1     carbidopa-levodopa (SINEMET)  MG tablet Take 1 tablet by mouth 3 times daily 276 tablet 1     levETIRAcetam (KEPPRA) 500 MG tablet Take two tablets twice a day. 360 tablet 3           Marcus is a 62 year old who is being evaluated via a billable video visit.      How would you like to obtain your AVS? MyChart  If the video visit is dropped, the invitation should be resent by: Send to e-mail at: [email or web address]  Will anyone else be joining your video visit? No      Video-Visit Details    Type of service:  Video Visit   Video Start Time:   Video End Time:    Originating Location (pt. Location): Home  Distant Location (provider location):  Off-site  Platform used for Video Visit: KENNETH Viera, [facility] ([facility])        Again, thank you for allowing me to participate in the care of your patient.        Sincerely,        Sreedhar Kathleen MD

## 2023-02-27 NOTE — PROGRESS NOTES
Marcus is a 62 year old who is being evaluated via a billable video visit.      How would you like to obtain your AVS? MyChart  If the video visit is dropped, the invitation should be resent by: Send to e-mail at: [email or web address]  Will anyone else be joining your video visit? No      Video-Visit Details    Type of service:  Video Visit   Video Start Time:   Video End Time:    Originating Location (pt. Location): Home  Distant Location (provider location):  Off-site  Platform used for Video Visit: KENNETH Viera, MARCO ANTONIO ([facility])

## 2023-02-27 NOTE — PATIENT INSTRUCTIONS
Medications     8am 10a 12p 4/5p 9/930p 10p   Carbidopa/levodopa Sinemet CR 50/200         1     Carbidopa/levodopa Sinemet 25/100 1   1 1       Levetiracetam keppra 500mg   2       2                                                                                      Slight slowness on walking and asymmetric reduced arm swing.      Plan:    SUSI Buck visit on 11/4/2022  Continuing on keppra and his level was 24.7 (10-40) on 11/15/2022  Taking levetiracetam keppra 500mg 2 tabs twice daily     Discussed medicare part D and medication coverage.     Discussed the medication schedule     Return 6 months.

## 2023-03-29 ENCOUNTER — TELEPHONE (OUTPATIENT)
Dept: GASTROENTEROLOGY | Facility: CLINIC | Age: 63
End: 2023-03-29

## 2023-03-29 DIAGNOSIS — Z12.11 SPECIAL SCREENING FOR MALIGNANT NEOPLASMS, COLON: Primary | ICD-10-CM

## 2023-03-29 RX ORDER — BISACODYL 5 MG/1
TABLET, DELAYED RELEASE ORAL
Qty: 4 TABLET | Refills: 0 | Status: SHIPPED | OUTPATIENT
Start: 2023-03-29 | End: 2023-08-09

## 2023-03-29 NOTE — TELEPHONE ENCOUNTER
Attempted to contact patient regarding upcoming Colonoscopy  procedure on 04.13.2023 for pre assessment questions. No answer.     Left message to return call to [phone] #4    Discuss Covid policy and designated  policy.    Pre op exam? N/A    Arrival time: 1125. Procedure time: 1210    Facility location: [facility]; [address], [place] [address]    Sedation type: Conscious sedation     Anticoagulants: No    Electronic implanted devices? No    Diabetic? No    Indication for procedure: screening colonoscopy    Bowel prep recommendation: Standard Golytely      Prep instructions sent via [employer]. Bowel prep script sent to      [id number] - [place], [place] - [address] [facility]      Cha Olmos RN  Endoscopy Procedure Pre Assessment RN

## 2023-03-29 NOTE — TELEPHONE ENCOUNTER
Pre assessment questions completed for upcoming Colonoscopy  procedure scheduled on 04.13.2023    COVID policy reviewed.     Reviewed procedural arrival time 1125 and facility location [facility]; [address], [place] [address]    Designated  policy reviewed. Instructed to have someone stay 6 hours post procedure.     Anticoagulation/blood thinners? No    Electronic implanted devices? No    Diabetic? No     Patient prefers Miralax/gatorade prep-writer sent new instructions.    Reviewed procedure prep instructions.     Patient verbalized understanding and had no questions or concerns at this time.    Cha Olmos, MAGALY  Endoscopy Procedure Pre Assessment RN

## 2023-04-06 ENCOUNTER — NURSE TRIAGE (OUTPATIENT)
Dept: FAMILY MEDICINE | Facility: CLINIC | Age: 63
End: 2023-04-06

## 2023-04-06 NOTE — TELEPHONE ENCOUNTER
"Patient is calling with concerns of constipation. Last bowel movement was on Monday. Last BM he had was hard and compact. No blood on the stool surface.     Patient states he feels a \"fullness feeling\" in bowel area and it is uncomfortable. Having some mild stomach pain and it is increasing a bit. Patient states this was enough to bother him last night and not get back to sleep.     Patient states he typically takes a generic costco stool softner pill 3-4x per week. Yesterday took 2 dulcolax and 2 8oz glasses of miralax without relief.     Patient states he has a colonoscopy scheduled next week and has bowel prep meds. Is wondering if he should just take these now due to constipation.     Pt also noting he has parkinson's disease.     Writer advised in person visit today. Pt prefers for virtual visit to be scheduled.      Vanessa Kurtz RN    [facility]    Reason for Disposition    Rectal pain or fullness from fecal impaction (rectum full of stool) and NOT better after SITZ bath, suppository or enema    Additional Information    Negative: Abdomen pain is main symptom and male    Negative: Abdomen pain is main symptom and female    Negative: Rectal bleeding or blood in stool is main symptom    Negative: Vomiting bile (green color)    Negative: Patient sounds very sick or weak to the triager    Negative: Constant abdominal pain lasting > 2 hours    Negative: Vomiting and abdomen looks much more swollen than usual    Answer Assessment - Initial Assessment Questions  1. STOOL PATTERN OR FREQUENCY: \"How often do you have a bowel movement (BM)?\"  (Normal range: 3 times a day to every 3 days)  \"When was your last BM?\"        Daily, might skip a day or two.   2. STRAINING: \"Do you have to strain to have a BM?\"       Not trying to  3. RECTAL PAIN: \"Does your rectum hurt when the stool comes out?\" If Yes, ask: \"Do you have hemorrhoids? How bad is the pain?\"  (Scale 1-10; or mild, " "moderate, severe)      No just extra work  4. STOOL COMPOSITION: \"Are the stools hard?\"       Yes somewhat  5. BLOOD ON STOOLS: \"Has there been any blood on the toilet tissue or on the surface of the BM?\" If Yes, ask: \"When was the last time?\"       Hard and clean  6. CHRONIC CONSTIPATION: \"Is this a new problem for you?\"  If no, ask: \"How long have you had this problem?\" (days, weeks, months)       Nothing ongoing, sporadic the last 3 months  7. CHANGES IN DIET OR HYDRATION: \"Have there been any recent changes in your diet?\" \"How much fluids are you drinking on a daily basis?\"  \"How much have you had to drink today?\"      3 - 16 oz bottles  8. MEDICATIONS: \"Have you been taking any new medications?\" \"Are you taking any narcotic pain medications?\" (e.g., Vicodin, Percocet, morphine, Dilaudid)      None  9. LAXATIVES: \"Have you been using any stool softeners, laxatives, or enemas?\"  If yes, ask \"What, how often, and when was the last time?\"      Takes stool softener a few months off and on 2-3x per week, generic costco pill.   10. ACTIVITY:  \"How much walking do you do every day?\"  \"Has your activity level decreased in the past week?\"         Rides stationary bike and aerobics at home almost daily  11. CAUSE: \"What do you think is causing the constipation?\"         Not sure  12. OTHER SYMPTOMS: \"Do you have any other symptoms?\" (e.g., abdominal pain, bloating, fever, vomiting)        Some of that, kept him a little awake last night. Full feeling. No visibile distention. No n/v.   13. MEDICAL HISTORY: \"Do you have a history of hemorrhoids, rectal fissures, or rectal surgery or rectal abscess?\"          No  14. PREGNANCY: \"Is there any chance you are pregnant?\" \"When was your last menstrual period?\"        NA    Protocols used: CONSTIPATION-A-OH      "

## 2023-04-11 NOTE — TELEPHONE ENCOUNTER
Patient calling, states received 2 different sets of instructions for the bowel prep.    Writer reviewed chart, noted patient preferred miralax prep per TE from 3.29.23.    Discussed the 2 different preps with patient. Has picked up the golytely from the pharmacy, states he will just do that one. Reviewed golytely prep and CLD, NPO status.     Patient verbalized understanding and in agreement with plan.     Vielka Hernandez RN

## 2023-04-13 ENCOUNTER — HOSPITAL ENCOUNTER (OUTPATIENT)
Facility: AMBULATORY SURGERY CENTER | Age: 63
Discharge: HOME | End: 2023-04-13
Attending: INTERNAL MEDICINE | Admitting: INTERNAL MEDICINE
Payer: COMMERCIAL

## 2023-04-13 VITALS
TEMPERATURE: 98.5 F | OXYGEN SATURATION: 98 % | HEART RATE: 71 BPM | DIASTOLIC BLOOD PRESSURE: 88 MMHG | SYSTOLIC BLOOD PRESSURE: 143 MMHG | RESPIRATION RATE: 16 BRPM

## 2023-04-13 LAB — COLONOSCOPY: NORMAL

## 2023-04-13 PROCEDURE — 88305 TISSUE EXAM BY PATHOLOGIST: CPT | Performed by: STUDENT IN AN ORGANIZED HEALTH CARE EDUCATION/TRAINING PROGRAM

## 2023-04-13 PROCEDURE — G8907 PT DOC NO EVENTS ON DISCHARG: HCPCS

## 2023-04-13 PROCEDURE — 45385 COLONOSCOPY W/LESION REMOVAL: CPT | Mod: PT

## 2023-04-13 PROCEDURE — G8918 PT W/O PREOP ORDER IV AB PRO: HCPCS

## 2023-04-13 RX ORDER — FLUMAZENIL 0.1 MG/ML
0.2 INJECTION, SOLUTION INTRAVENOUS
Status: DISCONTINUED | OUTPATIENT
Start: 2023-04-13 | End: 2023-04-14 | Stop reason: HOSPADM

## 2023-04-13 RX ORDER — NALOXONE HYDROCHLORIDE 0.4 MG/ML
0.4 INJECTION, SOLUTION INTRAMUSCULAR; INTRAVENOUS; SUBCUTANEOUS
Status: DISCONTINUED | OUTPATIENT
Start: 2023-04-13 | End: 2023-04-14 | Stop reason: HOSPADM

## 2023-04-13 RX ORDER — FENTANYL CITRATE 50 UG/ML
INJECTION, SOLUTION INTRAMUSCULAR; INTRAVENOUS PRN
Status: DISCONTINUED | OUTPATIENT
Start: 2023-04-13 | End: 2023-04-13 | Stop reason: HOSPADM

## 2023-04-13 RX ORDER — ONDANSETRON 4 MG/1
4 TABLET, ORALLY DISINTEGRATING ORAL EVERY 6 HOURS PRN
Status: DISCONTINUED | OUTPATIENT
Start: 2023-04-13 | End: 2023-04-14 | Stop reason: HOSPADM

## 2023-04-13 RX ORDER — LIDOCAINE 40 MG/G
CREAM TOPICAL
Status: DISCONTINUED | OUTPATIENT
Start: 2023-04-13 | End: 2023-04-14 | Stop reason: HOSPADM

## 2023-04-13 RX ORDER — NALOXONE HYDROCHLORIDE 0.4 MG/ML
0.2 INJECTION, SOLUTION INTRAMUSCULAR; INTRAVENOUS; SUBCUTANEOUS
Status: DISCONTINUED | OUTPATIENT
Start: 2023-04-13 | End: 2023-04-14 | Stop reason: HOSPADM

## 2023-04-13 RX ORDER — ONDANSETRON 2 MG/ML
4 INJECTION INTRAMUSCULAR; INTRAVENOUS
Status: DISCONTINUED | OUTPATIENT
Start: 2023-04-13 | End: 2023-04-14 | Stop reason: HOSPADM

## 2023-04-13 RX ORDER — ONDANSETRON 2 MG/ML
4 INJECTION INTRAMUSCULAR; INTRAVENOUS EVERY 6 HOURS PRN
Status: DISCONTINUED | OUTPATIENT
Start: 2023-04-13 | End: 2023-04-14 | Stop reason: HOSPADM

## 2023-04-13 RX ORDER — PROCHLORPERAZINE MALEATE 10 MG
10 TABLET ORAL EVERY 6 HOURS PRN
Status: DISCONTINUED | OUTPATIENT
Start: 2023-04-13 | End: 2023-04-14 | Stop reason: HOSPADM

## 2023-04-13 NOTE — H&P
ENDOSCOPY PRE-SEDATION H&P FOR OUTPATIENT PROCEDURES    Marcus Daley  [id number]  1960    Procedure: colonoscopy    Pre-procedure diagnosis: screening    Past medical history:   Past Medical History:   Diagnosis Date     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       Past surgical history:   Past Surgical History:   Procedure Laterality Date     COLONOSCOPY       TONSILLECTOMY      age 1-3 years       Current Outpatient Medications   Medication     bisacodyl (DULCOLAX) 5 MG EC tablet     carbidopa-levodopa (SINEMET CR)  MG CR tablet     carbidopa-levodopa (SINEMET)  MG tablet     levETIRAcetam (KEPPRA) 500 MG tablet     polyethylene glycol (GOLYTELY) 236 g suspension     Current Facility-Administered Medications   Medication     lidocaine (LMX4) kit     lidocaine 1 % 0.1-1 mL     ondansetron (ZOFRAN) injection 4 mg     sodium chloride (PF) 0.9% PF flush 3 mL     sodium chloride (PF) 0.9% PF flush 3 mL       No Known Allergies    History of Anesthesia/Sedation Problems: no    Physical Exam:    Mental status: alert  Heart: Normal  Lung: Normal  Assessment of patient's airway: Normal  Other as pertinent for procedure: None     ASA Score: See Provation note    Mallampati score:  I - Faucial pillars, soft palate, and uvula are visible    Assessment/Plan:     The patient is an appropriate candidate to receive sedation.    Informed consent was discussed with the patient/family, including the risks, benefits, potential complications and any alternative options associated with sedation.    Patient assessment completed just prior to sedation and while under constant observation by the provider. Condition determined to be adequate for proceeding with sedation.    The specific risks for the procedure were discussed with the patient at the time of informed consent and include but are not limited to perforation which could require surgery, missing significant neoplasm or lesion, hemorrhage and adverse sedative  complication.      Kalen Rose MD

## 2023-04-16 ENCOUNTER — HEALTH MAINTENANCE LETTER (OUTPATIENT)
Age: 63
End: 2023-04-16

## 2023-04-19 LAB
PATH REPORT.COMMENTS IMP SPEC: NORMAL
PATH REPORT.COMMENTS IMP SPEC: NORMAL
PATH REPORT.FINAL DX SPEC: NORMAL
PATH REPORT.GROSS SPEC: NORMAL
PATH REPORT.MICROSCOPIC SPEC OTHER STN: NORMAL
PATH REPORT.RELEVANT HX SPEC: NORMAL
PHOTO IMAGE: NORMAL

## 2023-06-12 ENCOUNTER — PATIENT OUTREACH (OUTPATIENT)
Dept: GERIATRIC MEDICINE | Facility: CLINIC | Age: 63
End: 2023-06-12

## 2023-06-12 NOTE — LETTER
June 12, 2023    KAI VILLAGRAN  [address] [place] [place] [address]    Dear  Kia,    Welcome to [facility] s MSC+ health program. My name is Kelley Deluca RN, BSN. I am your MSC+ care coordinator. You are eligible for Care Coordination through [facility] MSC+ plan.    As your care coordinator, we ll:  Meet to go over your care coordination benefits  Talk about your physical and mental health care needs   Review your preventative care needs  Create a plan that meets your needs with the services you choose    What happens next?  I ll call you soon to introduce myself and tell you more about my role. We ll then plan time to go over your health and safety needs. Our goal is to keep you as healthy and independent as possible.    Soon, you will receive a new MSC+ member identification (ID) card from [facility]. When you receive it, please use this card along with your [place] [facility] card and Prescription Drug Coverage Program card. When you receive, it please use this card where you get your health services. If you have Medicare, you will need to show your Medicare card when you get health services.    The [facility] care coordination program is voluntary and offered to you at no cost. If you wish to stop being in the care coordination program or have questions, call me at [phone]. If you reach my voicemail, leave a message and your phone number. TTY users, call the [place] Relay at 923 or [phone] (vcrjwl-fw-cnigcg relay service).    Sincerely,      Kelley Deluca RN, BSN  [phone]  [email or web address]     [id number] accepted   [id number]       [id number] (07/2022)

## 2023-06-12 NOTE — PROGRESS NOTES
[facility] Care Coordination Contact    Member became effective with [facility] on 6/1/2023 with Jasmyn MSC+.  Previous Health Plan: [facility]  Previous Care System: N/A  Previous care coordinators name and number: N/A  Waiver Type: N/A  Services Listed in MMIS: No entry in MMIS  UTF received: No UTF to request  Address/Phone discrepancy: N/A  [facility] mailed    Roseanna Byrne  Case Management Specialist   [facility]  [phone]

## 2023-06-13 ENCOUNTER — PATIENT OUTREACH (OUTPATIENT)
Dept: GERIATRIC MEDICINE | Facility: CLINIC | Age: 63
End: 2023-06-13

## 2023-06-13 NOTE — LETTER
Ying 15, 2023    KIA VILLAGRAN  [address] [place] [address]        Dear Kia:    As a member of [facility]'s MSC+ program, we offer a health risk assessment at no cost to you. I know you don't want to have the assessment right now. If you change your mind, please call me at the number below.    Who performs the health risk assessment?  A [facility] Care Coordinator performs the assessment. Our Care Coordinators can also help you understand your benefits. They can tell you about services to aid you at home, such as managing your care with your doctors if your health worsens.    Our Care Coordinators are here for you if you need:  Support for activities you used to do by yourself (including making meals, bathing and paying bills)  Equipment for bathroom or home safety  Help finding a new place to live  Information on staying healthy, preventing falls and immunizations    Questions?  If you have questions, or you would like to do he assessment, call me at [phone]. TTY users call [phone]. I'm here from 8am to 5pm. I may reach out to you again soon.       Sincerely,         Kelley Deluca RN, BSN  [phone]  [email or web address]       \<[id number] accepted  [id number]>    [id number] (21/2021)

## 2023-06-13 NOTE — PROGRESS NOTES
[facility] Care Coordination Contact      [facility] Refusal Telephone Assessment    Member refused home visit HRA on 06/13/2023 (reason: Member reports he needs not services and would not like a home visit.).    ER visits: No  Hospitalizations: No  Health concerns: Member reports no concerns.   Falls/Injuries: No     ADL/IADL Dependencies: Member reports independent.         Member currently receiving the following home care services:  None   Member currently receiving the following community resources: None   Informal support(s):  None    Advanced Care Planning discussion, complete code section.    [facility] Health Plan sponsored benefits: Shared information re: Silver Sneakers/gym memberships, ASA, Calcium +D.    Follow-Up Plan: Member informed of future contact, plan to f/u with member with a 6 month telephone assessment and offer a home visit.  Contact information shared with member and family, encouraged member to call with any questions or concerns at any time.    This CC note routed to PCP.    Kelley Deluca RN  [facility]  [phone]

## 2023-06-15 NOTE — PROGRESS NOTES
"Per CC, mailed client a \"Refusal of Home Visit\" letter.    Marzena Moncada  Case Management Specialist  [facility]  [phone]    "

## 2023-07-08 ENCOUNTER — HEALTH MAINTENANCE LETTER (OUTPATIENT)
Age: 63
End: 2023-07-08

## 2023-08-09 NOTE — PROGRESS NOTES
Diagnosis/Summary/Recommendations:    PATIENT: Marcus Daley  63 year old male     : 1960    JOHN: 2023    MRN: [id number]  [address] [place] [address] [phone] (M)   [phone] (H)   [phone] (W)      [email or web address]  Ronel Daley  [phone]     [email or web address]     Granted proxy access to his wife and daughter Neva     Assessment:  (G20) Paralysis agitans (H)  (primary encounter diagnosis)     FROM ANA 2021  We saw the patient in  for mild hand tremor.  He also had a history of REM behavior disorder.  We started him on carbidopa levodopa and he has had an excellent response.  He really has had no change over the past 3 years.  He continues to have an excellent response to low-dose carbidopa levodopa 25 101 tablet 3 times a day.  He has virtually no tremor and no signs of progression.  He is happy with his Parkinson's response.  He has had a first cousin who has developed Parkinson's disease but no first-degree relatives.     VOICE CHANGES  dysphonia  RIGHT ARM CHANGES NOTICED 2017 CHANGES IN HANDWRITING  2018 DIAGNOSIS PARKINSON WITH DR PEREZ - HAD SEEN GENESIS PREVIOUSLY 2018     Review of diagnosis    Parkinson's disease      Avoidance of dopamine blockers   Not taking     Motor complication review   No clear wearing off and doing well.      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 years - [employer]  Tries to do daily stretches and aerobics  Has not done big and loud therapy  Keeping active - tries to go in 2/week  And does aerobics and stationary bike     Cognitive/Driving   Driving   DOT physical 2021 and will need another evaluation 2022 and will have this done at [facility]       Mood   Will start golfing again  Mood down this winter  Sun room     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 instruction     No travel plans     Hallucinations/delusions   No shadows     Sleep   Goes to sleep 1130pm  Pretty quickly falls asleep  Snoring  Not clearly talking  Had one rare episode of physical movement  He punched through a pillow one occasion - RBD  Consider sleep consultation  May wake up to go the bathroom at 3am and usually can go back to sleep   Wakes up for the day at 8 or 830am   He is not taking melatonin 3-5mg and titrated upwards for rem sleep disorder     Bladder/Renal/Prostate/Gyn/Other   age related issues  Nocturia 1/noc  No clear urgency or frequency  Some dribbling     GI/Constipation/GERD   Constipation - occasional laxative  Bowel movements are daily or other day  discussion about management of constipation     ENDO/Lipid/DM/Bone density/Thyroid  denies     Cardio/heart/Hyper or Hypotensive   143/81 -   Denies blood pressure problems     Vision/Dry Eyes/Cataracts/Glaucoma/Macular   Denies eye problems     Heme/Anticoagulation/Antiplatelet/Anemia/Other  Not taking an aspirin     ENT/Resp  ANOSMIA/hyposmia  Dysphonia  Muscle tension dysphonia      Skin/Cancer/Seborrhea/other  Denies cancer   Skin screening examination for cancer at some point.      Musculoskeletal/Pain/Headache  Upper spine - stiff neck - manageable with heat or ice     Other:  SEIZURE free since November 2019     Seizure disorder.  The patient had 2 unprovoked seizures at nighttime.  He would scream and thrash and this was observed by his wife.  We started him on levetiracetam and escalated the dose to 1000 mg twice daily.  The last seizure was on 11/1/2019.  He has had no seizures since.  We permitted driving after 6 months of medical compliance and seizure-free interval.  He now wonders about getting a commercial license.  He is looked into it and if we write that he has a seizure disorder and not use the term epilepsy this may  qualify him as he is 2 years seizure-free.     NORMAL HEAD CT SCAN 6/19/2019  1.  Normal exam.       BRAIN MRI 6/26/2019  1. No acute intracranial pathology.  2. Mild patchy white matter changes in the katarina which are similar to the prior study and may represent small vessel ischemic disease..      CERVICAL SPINE MRI 5/17/2018  1. Multilevel cervical spondylosis and degenerative spondylolisthesis. Moderate to advanced neural foraminal stenosis on the left at C6-7.  Moderate neural foraminal stenosis on the left at C3-4 and on the right at C4-5.  2. No significant spinal canal stenosis.  3. No cord signal abnormality.  4. Bilateral atlantooccipital assimilation.     eeg 6/27/2019  This is a normal waking and sleep EEG.     Return to see Dr. Buck and review the carlos Buck visit on 11/4/2022  Continuing on keppra and his level was 24.7 (10-40) on 11/15/2022  Taking levetiracetam keppra 500mg 2 tabs twice daily      Discussed medicare part D and medication coverage.      Discussed the medication schedule          Medications     8a 10a 12p 4/5p 9/930 10p   Carbidopa/levodopa Sinemet CR 50/200         1/2-1     Carbidopa/levodopa Sinemet 25/100 1   1 1       Levetiracetam keppra 500mg   2       2                                                                                      Slight slowness on walking and asymmetric reduced arm swing.        Plan:    11/3/2023 1045am check in and visit at 11am with Dr. Buck    Reviewed sinemet and discussed increasing it to 1.5 tabs 3/day   Sinemet CR continue on this    No therapy desired at this time    Pharmacy ([facility]) consultation and medication management  Please call the scheduling number I@ [phone] to set up an appointment with pharmacists Suzan Lua or uLz Beard.     Debating going somewhere - may visit in [place]    Constipation  Consider taking something like senna-docusate or/and polyethylene glycol = miralax or  [facility]    Pharmacy ([facility]) consultation and medication management  Please call the scheduling number I@ [phone] to set up an appointment with pharmacists Suzan Lua or Luz Beard.       Coding statement:   Medical Decision Making:  #  Chronic progressive medical conditions addressed  - see above --   Review and/or interpretation of unique test or documentation from a provider outside of neurology no   Independent historian provided additional details  yes I  Prescription drug management and review of potential side effects and/or monitoring for side effects  -- see above ---  Health impacted by social determinants of health  no    I have reviewed the note as documented above.  This accurately captures the substance of my conversation with the patient and total time spent preparing for visit, executing visit and completing visit on the day of the visit:  30 minutes.  The portion of this total time included face to face time 26 minutes      Sreedhar Kathleen MD     ______________________________________    Last visit date and details:             ______________________________________      Patient was asked about 14 Review of systems including changes in vision (dry eyes, double vision), hearing, heart, lungs, musculoskeletal, depression, anxiety, snoring, RBD, insomnia, urinary frequency, urinary urgency, constipation, swallowing problems, hematological, ID, allergies, skin problems: seborrhea, endocrinological: thyroid, diabetes, cholesterol; balance, weight changes, and other neurological problems and these were not significant at this time except for   Patient Active Problem List   Diagnosis    Paralysis agitans (H)    Parkinson's disease (H)    Bilateral inguinal hernia    Seizure disorder (H)    Tobacco use disorder        No Known Allergies  Past Surgical History:   Procedure Laterality Date    COLONOSCOPY      COLONOSCOPY N/A 4/13/2023    Procedure: COLONOSCOPY, FLEXIBLE, WITH LESION REMOVAL USING  SNARE;  Surgeon: Kalen Rose MD;  Location: [facility] OR    COLONOSCOPY WITH CO2 INSUFFLATION N/A 4/13/2023    Procedure: COLONOSCOPY, WITH CO2 INSUFFLATION;  Surgeon: Kalen Rose MD;  Location: [facility] OR    TONSILLECTOMY      age 2-3 years     Past Medical History:   Diagnosis Date    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 Not Asked   Social History Narrative    Copier/field service repair/computer repair for 20-30 years        Daughter lives in [place]         Ronel retired wife retired  for [employer]        Lives in [place]         Social Determinants of Health     Financial Resource Strain: Not on file   Food Insecurity: Not on file   Transportation Needs: Not on file   Physical Activity: Not on file   Stress: Not on file   Social Connections: Not on file   Intimate Partner Violence: Not on file   Housing Stability: Not on file       Drug and lactation database from the [place] National Library of Medicine:  http://toxnet.nlm.nih.gov/cgi-bin/sis/htmlgen?LACT      B/P: Data Unavailable, T: Data Unavailable, P: Data Unavailable, R: Data Unavailable 0 lbs 0 oz  There were no vitals taken for this visit., There is no height or weight on file to calculate BMI.  Medications and Vitals not listed above were documented in the cart and reviewed by me.     Current Outpatient Medications   Medication  Sig Dispense Refill    bisacodyl (DULCOLAX) 5 MG EC tablet Take 2 tablets at 3 pm the day before your procedure. If your procedure is before 11 am, take 2 additional tablets at 11 pm. If your procedure is after 11 am, take 2 additional tablets at 6 am. For additional instructions refer to your colonoscopy prep instructions. 4 tablet 0    carbidopa-levodopa (SINEMET CR)  MG CR tablet Take 1 tablet by mouth At Bedtime 92 tablet 3    carbidopa-levodopa (SINEMET)  MG tablet Take 1 tablet by mouth 3 times daily 276 tablet 3    levETIRAcetam (KEPPRA) 500 MG tablet Take two tablets twice a day. 360 tablet 3    polyethylene glycol (GOLYTELY) 236 g suspension The night before the exam at 6 pm drink an 8-ounce glass every 15 minutes until the jug is half empty. If you arrive before 11 AM: Drink the other half of the Golytely jug at 11 PM night before procedure. If you arrive after 11 AM: Drink the other half of the Golytely jug at 6 AM day of procedure. For additional instructions refer to your colonoscopy prep instructions. 4000 mL 0         Sreedhar Kathleen MD

## 2023-08-21 ENCOUNTER — OFFICE VISIT (OUTPATIENT)
Dept: NEUROLOGY | Facility: CLINIC | Age: 63
End: 2023-08-21
Payer: COMMERCIAL

## 2023-08-21 VITALS
HEART RATE: 84 BPM | SYSTOLIC BLOOD PRESSURE: 149 MMHG | WEIGHT: 206 LBS | DIASTOLIC BLOOD PRESSURE: 88 MMHG | BODY MASS INDEX: 27.9 KG/M2 | HEIGHT: 72 IN

## 2023-08-21 DIAGNOSIS — G20.A1 PARKINSON'S DISEASE (H): Primary | ICD-10-CM

## 2023-08-21 DIAGNOSIS — G20.A1 PARALYSIS AGITANS (H): ICD-10-CM

## 2023-08-21 PROCEDURE — 99214 OFFICE O/P EST MOD 30 MIN: CPT | Performed by: PSYCHIATRY & NEUROLOGY

## 2023-08-21 RX ORDER — CARBIDOPA AND LEVODOPA 50; 200 MG/1; MG/1
1 TABLET, EXTENDED RELEASE ORAL AT BEDTIME
Qty: 92 TABLET | Refills: 3 | Status: SHIPPED | OUTPATIENT
Start: 2023-08-21 | End: 2024-02-12

## 2023-08-21 RX ORDER — CARBIDOPA AND LEVODOPA 25; 100 MG/1; MG/1
1.5 TABLET ORAL 3 TIMES DAILY
Qty: 420 TABLET | Refills: 3 | Status: SHIPPED | OUTPATIENT
Start: 2023-08-21 | End: 2024-02-12

## 2023-08-21 NOTE — PATIENT INSTRUCTIONS
Medications     8a 10a 12p 4/5p 9/930 10p   Carbidopa/levodopa Sinemet CR 50/200         1/2-1     Carbidopa/levodopa Sinemet 25/100 1   1 1       Levetiracetam keppra 500mg   2       2                                                                                      Slight slowness on walking and asymmetric reduced arm swing.        Plan:    11/3/2023 1045am check in and visit at 11am with Dr. Buck    Reviewed sinemet and discussed increasing it to 1.5 tabs 3/day   Sinemet CR continue on this    No therapy desired at this time    Pharmacy (MTM) consultation and medication management  Please call the scheduling number I@ [phone] to set up an appointment with pharmacists Suzan Lua or Luz Beard.     Debating going somewhere - may visit in [place]    Constipation  Consider taking something like senna-docusate or/and polyethylene glycol = miralax or glycolax    Pharmacy ([facility]) consultation and medication management  Please call the scheduling number I@ [phone] to set up an appointment with pharmacists Suzan Lua or Luz Beard.

## 2023-08-21 NOTE — NURSING NOTE
Marcus Daley's goals for this visit include:   Chief Complaint   Patient presents with    Parkinson      Parkinsons // 6 month follow up       He requests these members of his care team be copied on today's visit information: yes    PCP: Murphy Jalloh    Referring Provider:  Abelardo Rodriguez MD  [address] [place] [id number]    BP (!) 149/88   Pulse 84   Ht 1.829 m (6')   Wt 93.4 kg (206 lb)   BMI 27.94 kg/m      Do you need any medication refills at today's visit? No  KENNETH Kasper, CMA ([facility])

## 2023-08-21 NOTE — LETTER
2023         RE: Marcus Daley  [address] [place] [address]        Dear Colleague,    Thank you for referring your patient, Marcus Daley, to the [facility] [place]. Please see a copy of my visit note below.        Diagnosis/Summary/Recommendations:    PATIENT: Marcus Daley  63 year old male     : 1960    JOHN: 2023    MRN: [id number]  [address] [place] [address] [phone] (M)   [phone] (H)   [phone] (W)      [email or web address]  Ronel Daley  [phone]     neva daley     [email or web address]     Granted proxy access to his wife and daughter Neva     Assessment:  (G20) Paralysis agitans (H)  (primary encounter diagnosis)     FROM ANA 2021  We saw the patient in  for mild hand tremor.  He also had a history of REM behavior disorder.  We started him on carbidopa levodopa and he has had an excellent response.  He really has had no change over the past 3 years.  He continues to have an excellent response to low-dose carbidopa levodopa 25 101 tablet 3 times a day.  He has virtually no tremor and no signs of progression.  He is happy with his Parkinson's response.  He has had a first cousin who has developed Parkinson's disease but no first-degree relatives.     VOICE CHANGES  dysphonia  RIGHT ARM CHANGES NOTICED 2017 CHANGES IN HANDWRITING  2018 DIAGNOSIS PARKINSON WITH DR PEREZ - HAD SEEN GENESIS PREVIOUSLY 2018     Review of diagnosis    Parkinson's disease      Avoidance of dopamine blockers   Not taking     Motor complication review   No clear wearing off and doing well.      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 years - [employer]  Tries to do daily stretches and  aerobics  Has not done big and loud therapy  Keeping active - tries to go in 2/week  And does aerobics and stationary bike     Cognitive/Driving   Driving   DOT physical October 2021 and will need another evaluation October 2022 and will have this done at [facility]      Mood   Will start golfing again  Mood down this winter  Sun room     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 instruction     No travel plans     Hallucinations/delusions   No shadows     Sleep   Goes to sleep 1130pm  Pretty quickly falls asleep  Snoring  Not clearly talking  Had one rare episode of physical movement  He punched through a pillow one occasion - RBD  Consider sleep consultation  May wake up to go the bathroom at 3am and usually can go back to sleep   Wakes up for the day at 8 or 830am   He is not taking melatonin 3-5mg and titrated upwards for rem sleep disorder     Bladder/Renal/Prostate/Gyn/Other   age related issues  Nocturia 1/noc  No clear urgency or frequency  Some dribbling     GI/Constipation/GERD   Constipation - occasional laxative  Bowel movements are daily or other day  discussion about management of constipation     ENDO/Lipid/DM/Bone density/Thyroid  denies     Cardio/heart/Hyper or Hypotensive   143/81 -   Denies blood pressure problems     Vision/Dry Eyes/Cataracts/Glaucoma/Macular   Denies eye problems     Heme/Anticoagulation/Antiplatelet/Anemia/Other  Not taking an aspirin     ENT/Resp  ANOSMIA/hyposmia  Dysphonia  Muscle tension dysphonia      Skin/Cancer/Seborrhea/other  Denies cancer   Skin screening examination for cancer at some point.      Musculoskeletal/Pain/Headache  Upper spine - stiff neck - manageable with heat or ice     Other:  SEIZURE free since November 2019     Seizure disorder.  The patient had 2 unprovoked seizures at nighttime.  He would scream and thrash and this was observed by his wife.  We started him on levetiracetam and escalated the dose to 1000 mg twice daily.  The last seizure was on 11/1/2019.  He  has had no seizures since.  We permitted driving after 6 months of medical compliance and seizure-free interval.  He now wonders about getting a commercial license.  He is looked into it and if we write that he has a seizure disorder and not use the term epilepsy this may qualify him as he is 2 years seizure-free.     NORMAL HEAD CT SCAN 6/19/2019  1.  Normal exam.       BRAIN MRI 6/26/2019  1. No acute intracranial pathology.  2. Mild patchy white matter changes in the katarina which are similar to the prior study and may represent small vessel ischemic disease..      CERVICAL SPINE MRI 5/17/2018  1. Multilevel cervical spondylosis and degenerative spondylolisthesis. Moderate to advanced neural foraminal stenosis on the left at C6-7.  Moderate neural foraminal stenosis on the left at C3-4 and on the right at C4-5.  2. No significant spinal canal stenosis.  3. No cord signal abnormality.  4. Bilateral atlantooccipital assimilation.     eeg 6/27/2019  This is a normal waking and sleep EEG.     Return to see Dr. Buck and review the carlos Buck visit on 11/4/2022  Continuing on keppra and his level was 24.7 (10-40) on 11/15/2022  Taking levetiracetam keppra 500mg 2 tabs twice daily      Discussed medicare part D and medication coverage.      Discussed the medication schedule          Medications     8a 10a 12p 4/5p 9/930 10p   Carbidopa/levodopa Sinemet CR 50/200         1/2-1     Carbidopa/levodopa Sinemet 25/100 1   1 1       Levetiracetam keppra 500mg   2       2                                                                                      Slight slowness on walking and asymmetric reduced arm swing.        Plan:    11/3/2023 1045am check in and visit at 11am with Dr. Buck    Reviewed sinemet and discussed increasing it to 1.5 tabs 3/day   Sinemet CR continue on this    No therapy desired at this time    Pharmacy (MTM) consultation and medication management  Please call the  scheduling number I@ [phone] to set up an appointment with pharmacists Suzan Lua or Luz Beard.     Debating going somewhere - may visit in [place]    Constipation  Consider taking something like senna-docusate or/and polyethylene glycol = miralax or glycolax    Pharmacy (MTM) consultation and medication management  Please call the scheduling number I@ [phone] to set up an appointment with pharmacists Suzan Lua or Luz Beard.       Coding statement:   Medical Decision Making:  #  Chronic progressive medical conditions addressed  - see above --   Review and/or interpretation of unique test or documentation from a provider outside of neurology no   Independent historian provided additional details  yes I  Prescription drug management and review of potential side effects and/or monitoring for side effects  -- see above ---  Health impacted by social determinants of health  no    I have reviewed the note as documented above.  This accurately captures the substance of my conversation with the patient and total time spent preparing for visit, executing visit and completing visit on the day of the visit:  30 minutes.  The portion of this total time included face to face time 26 minutes      Sreedhar Kathleen MD     ______________________________________    Last visit date and details:             ______________________________________      Patient was asked about 14 Review of systems including changes in vision (dry eyes, double vision), hearing, heart, lungs, musculoskeletal, depression, anxiety, snoring, RBD, insomnia, urinary frequency, urinary urgency, constipation, swallowing problems, hematological, ID, allergies, skin problems: seborrhea, endocrinological: thyroid, diabetes, cholesterol; balance, weight changes, and other neurological problems and these were not significant at this time except for   Patient Active Problem List   Diagnosis     Paralysis agitans (H)     Parkinson's disease  (H)     Bilateral inguinal hernia     Seizure disorder (H)     Tobacco use disorder        No Known Allergies  Past Surgical History:   Procedure Laterality Date     COLONOSCOPY       COLONOSCOPY N/A 4/13/2023    Procedure: COLONOSCOPY, FLEXIBLE, WITH LESION REMOVAL USING SNARE;  Surgeon: Kalen Rose MD;  Location: [facility] OR     COLONOSCOPY WITH CO2 INSUFFLATION N/A 4/13/2023    Procedure: COLONOSCOPY, WITH CO2 INSUFFLATION;  Surgeon: Kalen Rose MD;  Location: [facility] OR     TONSILLECTOMY      age 1-3 years     Past Medical History:   Diagnosis Date     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 Not Asked   Social History Narrative    Copier/field service repair/computer repair for 20-30 years        Daughter lives in [place]         Ronel retired wife retired  for [employer]        Lives in [place]         Social Determinants of Health     Financial Resource Strain: Not on file   Food Insecurity: Not on file   Transportation Needs: Not on file   Physical Activity: Not on file   Stress: Not on file   Social Connections: Not on file   Intimate Partner Violence: Not on file   Housing Stability: Not on file       Drug and lactation database from the [place] National Library of Medicine:  http://toxnet.nlm.nih.gov/cgi-bin/sis/htmlgen?LACT      B/P: Data Unavailable, T: Data Unavailable,  P: Data Unavailable, R: Data Unavailable 0 lbs 0 oz  There were no vitals taken for this visit., There is no height or weight on file to calculate BMI.  Medications and Vitals not listed above were documented in the cart and reviewed by me.     Current Outpatient Medications   Medication Sig Dispense Refill     bisacodyl (DULCOLAX) 5 MG EC tablet Take 2 tablets at 3 pm the day before your procedure. If your procedure is before 11 am, take 2 additional tablets at 11 pm. If your procedure is after 11 am, take 2 additional tablets at 6 am. For additional instructions refer to your colonoscopy prep instructions. 4 tablet 0     carbidopa-levodopa (SINEMET CR)  MG CR tablet Take 1 tablet by mouth At Bedtime 92 tablet 3     carbidopa-levodopa (SINEMET)  MG tablet Take 1 tablet by mouth 3 times daily 276 tablet 3     levETIRAcetam (KEPPRA) 500 MG tablet Take two tablets twice a day. 360 tablet 3     polyethylene glycol (GOLYTELY) 236 g suspension The night before the exam at 6 pm drink an 8-ounce glass every 15 minutes until the jug is half empty. If you arrive before 11 AM: Drink the other half of the Golytely jug at 11 PM night before procedure. If you arrive after 11 AM: Drink the other half of the Golytely jug at 6 AM day of procedure. For additional instructions refer to your colonoscopy prep instructions. 4000 mL 0         Sreedhar Kathleen MD      Again, thank you for allowing me to participate in the care of your patient.        Sincerely,        Sreedhar Kathleen MD

## 2023-08-24 ENCOUNTER — TELEPHONE (OUTPATIENT)
Dept: NEUROLOGY | Facility: CLINIC | Age: 63
End: 2023-08-24
Payer: COMMERCIAL

## 2023-08-24 NOTE — TELEPHONE ENCOUNTER
MTM referral from: [facility] visit (referral by provider)    MTM referral outreach attempt #2 on August 24, 2023 at 10:57 AM      Outcome: Patient not reachable after several attempts, will route to MTM Pharmacist/Provider as an FYI.  [facility] scheduling number is [phone].  Thank you for the referral.    Use BC[employer] Part D for the carrier/Plan on the flowsheet    Ines Klein CPhT  [facility]

## 2023-09-11 ENCOUNTER — PATIENT OUTREACH (OUTPATIENT)
Dept: GERIATRIC MEDICINE | Facility: CLINIC | Age: 63
End: 2023-09-11
Payer: COMMERCIAL

## 2023-09-11 NOTE — LETTER
September 11, 2023    KIA VILLAGRAN  [address] [place] [address]      Dear Kia,    Welcome to [facility] health program. My name is Kelley Deluca RN, BSN. I am your [facility] care coordinator. You're eligible for care coordination through your [facility] Product.    As your care coordinator, we ll:  Meet to go over your care coordination benefits  Talk about your physical and mental health care needs   Review your preventative care needs  Create a plan that meets your needs with the services you choose    What happens next?  I ll call you soon to introduce myself and tell you more about my role. We ll then plan time to go over your health and safety needs. Our goal is to keep you as healthy and independent as possible.    [facility] combines the benefits you may already have receive from Medical Assistance, Medicare and the Prescription Drug Coverage Program. Soon you'll receive a new member identification (ID) card from [facility]. Use this card whenever you get health services.    The [facility] care coordination program is voluntary and offered to you at no cost. If you wish to stop being in the care coordination program or have questions, call me at [phone]. If you reach my voicemail, leave a message and your phone number. TTY users, call the [place] Relay at 536 or [phone] (ttlaya-dz-cbmfqd relay service).    Sincerely,    Kelley Deluca RN, BSN  [phone]  [email or web address] ([facility] in the [place]) is a health plan that contracts with both Medicare and the [place] Medical Assistance (Medicaid) program to provide benefits of both programs to enrollees. Enrollment in [facility] depends on contract renewal.    [id number] accepted   [id number]         [id number] (07/2022)

## 2023-09-11 NOTE — PROGRESS NOTES
[facility] Care Coordination Contact    Member changed health plan products from [facility] MSC+ to [facility] MSHO effective 9/1/2023. CC to complete items on Product Change/ Health Plan Change check list including MMIS entry. [place] and other required tasks.  Mailed  & [facility] leave behind.    Roseanna Byrne  Case Management Specialist   [facility]  [phone]

## 2023-09-20 ENCOUNTER — PATIENT OUTREACH (OUTPATIENT)
Dept: GERIATRIC MEDICINE | Facility: CLINIC | Age: 63
End: 2023-09-20
Payer: COMMERCIAL

## 2023-09-20 NOTE — LETTER
September 22, 2023    KIA VILLAGRAN  [address] [place] [address]        Dear Kia:    As a member of [facility], we offer a health risk assessment at no cost to you. I know you don't want to have the assessment right now. If you change your mind, please call me at the number below.    Who performs the health risk assessment?  A [facility] Care Coordinator performs the assessment. Our Care Coordinators can also help you understand your benefits. They can tell you about services to aid you at home, such as managing your care with your doctors if your health worsens.    Our Care Coordinators are here for you if you need:  Support for activities you used to do by yourself (including making meals, bathing, and paying bills)  Equipment for bathroom or home safety  Help finding a new place to live  Information on staying healthy, preventing falls and immunizations    Questions?  If you have questions, or you would like to do the assessment, call me at [phone]. TTY users call [phone]. I'm here from 8am to 5pm. I may reach out to you again soon.      Sincerely,        Kelley Deluca RN, BSN  [phone]  [email or web address]     <[id number] accepted    [id number] (12/2021)  [id number]>

## 2023-09-21 NOTE — PROGRESS NOTES
[facility] Care Coordination Contact    CC received notification that member's health plan or health plan product changed from are MSC+ to [employer] MSHO effective 09/01/2023.       [facility] Refusal Telephone Assessment    Member refused home visit HRA on 09/01/2023 (reason: member reports no needs and declined home visit.).    ER visits: No  Hospitalizations: No  Health concerns: No concerns.  Falls/Injuries: No  ADL/IADL Dependencies: Independent        Member currently receiving the following home care services:  None  Member currently receiving the following community resources:  None  Informal support(s): None     Advanced Care Planning discussion, complete code section.    [facility] Health Plan sponsored benefits: Shared information re: Silver Sneakers/gym memberships, ASA, Calcium +D.    Follow-Up Plan: Member informed of future contact, plan to f/u with member with a 6 month telephone assessment and offer a home visit.  Contact information shared with member and family, encouraged member to call with any questions or concerns at any time.    This CC note routed to PCP, Murphy Jalloh.    Kelley Deluca RN  [facility]  [phone]

## 2023-09-22 NOTE — PROGRESS NOTES
"[facility] Care Coordination Contact    Per CC, mailed client a \"Refusal of Home Visit\" letter. Mailed [employer] leave behind.       Cha Underwood  Care Management Specialist   [facility]   [phone]    "

## 2023-10-11 ENCOUNTER — VIRTUAL VISIT (OUTPATIENT)
Dept: PHARMACY | Facility: CLINIC | Age: 63
End: 2023-10-11
Payer: COMMERCIAL

## 2023-10-11 DIAGNOSIS — G20.A1 PARKINSON'S DISEASE WITHOUT DYSKINESIA OR FLUCTUATING MANIFESTATIONS (H): Primary | ICD-10-CM

## 2023-10-11 DIAGNOSIS — G40.909 SEIZURE DISORDER (H): ICD-10-CM

## 2023-10-11 PROCEDURE — 99605 MTMS BY PHARM NP 15 MIN: CPT | Performed by: PHARMACIST

## 2023-10-11 NOTE — LETTER
October 11, 2023  Marcus BAZZI Thuy  [address] [place] [address]    Dear  [facility]     Thank you for talking with me on Oct 11, 2023 about your health and medications. As a follow-up to our conversation, I have included two documents:      Your Recommended To-Do List has steps you should take to get the best results from your medications.  Your Medication List will help you keep track of your medications and how to take them.    If you want to talk about these documents, please call Suzan Lua RPH at phone: [phone], Monday-Friday 8-4:30pm.    I look forward to working with you and your doctors to make sure your medications work well for you.    Sincerely,  Suzan Lua RPH  [facility] Pharmacist, [facility]

## 2023-10-11 NOTE — PROGRESS NOTES
Medication Therapy Management (MTM) Encounter    ASSESSMENT:                            Medication Adherence/Access: No issues identified    Parkinson's Disease:    Improved with higher dosage of carbidopa-levodopa     Seizure Disorder:  Stable     PLAN:                            Continue higher dose of carbidopa-levodopa  mg, 1.5 tablets 3 times/day  If you notice more difficulty movement or the medication wears off before the next dosage is due, I'm happy to meet with you again before your next visit with Dr. Kathleen     Follow-up: 2/12/24 with Dr. Kathleen and with me yearly or as needed    SUBJECTIVE/OBJECTIVE:                          Marcus Daley is a 63 year old male called for a follow-up visit from 5/24/22.       Reason for visit: medication review.    Allergies/ADRs: Reviewed in chart  Past Medical History: Reviewed in chart  Tobacco: He reports that he has quit smoking. His smoking use included cigars. He has never used smokeless tobacco. - 2-3 mini cigars/month  Alcohol: 1-2 beer about 3-4 nights weekly    Medication Adherence/Access: no issues reported    Parkinson's Disease:    carbidopa-levodopa  mg, 1.5 tablets 3 times/day -- 8 am, 11:30 am, 4 pm  Carbidopa-levodopa  mg, half to full tablet, nightly   Reports he is trying to take the medication on an empty stomach.   He has noticed a little better movement since increasing the carbidopa-levodopa.   He doesn't notice that the medication wears off before next dose is due.  He didn't have any issues with dizziness/nausea after increasing carbidopa-levodopa.   He has been sleeping really well at night.   He doesn't think he is active during the night. Sometimes sleeps on his own or with his wife-- sometimes vocalized things but is not active out dreams.   States he hasn't had any balance issues.  He is in a health club, walks/ride bike. Is a little worried about winter coming up - he plays guitar, listens to music, sees live music,  sporting events.     Seizure Disorder:  Keppra 1000 mg twice daily   Reports no seizures for 4 years now.     Today's Vitals: There were no vitals taken for this visit.  ----------------      I spent 17 minutes with this patient today. All changes were made via collaborative practice agreement with Dr. Kathleen. A copy of the visit note was provided to the patient's provider(s).    A summary of these recommendations was sent via [employer].    Suzan Lua, Pharm.D.  Medication Therapy Management Pharmacist  [facility]    Telemedicine Visit Details  Type of service:  Telephone visit  Start Time:  2:01 PM  End Time: 2:18 PM       Medication Therapy Recommendations  No medication therapy recommendations to display

## 2023-10-11 NOTE — LETTER
_  Medication List        Prepared on: 10/11/2023     Bring your Medication List when you go to the doctor, hospital, or   emergency room. And, share it with your family or caregivers.     Note any changes to how you take your medications.  Cross out medications when you no longer use them.    Medication How I take it Why I use it Prescriber   carbidopa-levodopa (SINEMET CR)  MG CR tablet Take 1 tablet by mouth At Bedtime Parkinson's  Sreedhar Kathleen MD   carbidopa-levodopa (SINEMET)  MG tablet Take 1.5 tablets by mouth 3 times daily Parkinson's  Sreedhar Kathleen MD   levETIRAcetam (KEPPRA) 500 MG tablet Take 2 tablets by mouth twice daily  Seizures Aidee Buck MD         Add new medications, over-the-counter drugs, herbals, vitamins, or  minerals in the blank rows below.    Medication How I take it Why I use it Prescriber                                      Allergies:      No Known Allergies        Side effects I have had:               Other Information:              My notes and questions:

## 2023-10-11 NOTE — PATIENT INSTRUCTIONS
"Recommendations from today's MTM visit:                                                    MTM (medication therapy management) is a service provided by a clinical pharmacist designed to help you get the most of out of your medicines.      Continue higher dose of carbidopa-levodopa  mg, 1.5 tablets 3 times/day  If you notice more difficulty movement or the medication wears off before the next dosage is due, I'm happy to meet with you again before your next visit with Dr. Kathleen     Follow-up: 2/12/24 with Dr. Kathleen and with me yearly or as needed    It was great speaking with you today.  I value your experience and would be very thankful for your time in providing feedback in our clinic survey. In the next few days, you may receive an email or text message from [employer] with a link to a survey related to your  clinical pharmacist.\"     To schedule another MTM appointment, please call the clinic directly or you may call the MTM scheduling line at [phone] or toll-free at [phone].     My Clinical Pharmacist's contact information:                                                      Please feel free to contact me with any questions or concerns you have.      Suzan Lua, Pharm.D.  Medication Therapy Management Pharmacist  [facility]     "

## 2023-10-11 NOTE — LETTER
"Recommended To-Do List      Prepared on: 10/11/2023       You can get the best results from your medications by completing the items on this \"To-Do List.\"      Bring your To-Do List when you go to your doctor. And, share it with your family or caregivers.    My To-Do List:  What we talked about: What I should do:   What my medicines are for, how to know if my medicines are working, made sure my medicines are safe for me and reviewed how to take my medicines.   Take my medicines every day               "

## 2023-10-25 DIAGNOSIS — G40.909 SEIZURE DISORDER (H): ICD-10-CM

## 2023-10-27 RX ORDER — LEVETIRACETAM 500 MG/1
TABLET ORAL
Qty: 360 TABLET | Refills: 0 | Status: SHIPPED | OUTPATIENT
Start: 2023-10-27 | End: 2024-01-19

## 2023-10-27 NOTE — TELEPHONE ENCOUNTER
levETIRAcetam Oral Tablet 500 MG     Last Written Prescription Date:  11/4/22  Last Fill Quantity: 360,   # refills: 3  Last Office Visit : 11/4/22 (Dr. Shahab Daly- also sees  for Parkinson's)  Future Office visit:  12/8/23  Last Keppra level =24.7 on 11/15/22   Refilled per protocol.

## 2023-11-17 ENCOUNTER — MYC MEDICAL ADVICE (OUTPATIENT)
Dept: FAMILY MEDICINE | Facility: CLINIC | Age: 63
End: 2023-11-17
Payer: COMMERCIAL

## 2023-11-17 NOTE — TELEPHONE ENCOUNTER
RN COVID TREATMENT VISIT  11/17/23      The patient has been triaged and does not require a higher level of care.    Marcus Daley  63 year old  Current weight? 206 lb    Has the patient been seen by a primary care provider at an [facility] or [facility] within the past two years? No, therefore patient is not eligible for COVID treatment standing order.   Patient should be informed a virtual visit with a provider will be required for treatment. Patient will be scheduled or transferred to a  at the end of this call.     Pt has not been seen by an [facility] Primary Care Provider since 2018. Most recent Primary Care visits are 2021 through [facility].    RN contacted pt and notified he does not qualify for tx via RN tx protocol due to not established with [facility] Primary Care. RN assisted pt with scheduling a virtual visit with a provider tomorrow. Pt indicates understanding of issues and agrees with the plan.    Cha Sauceda RN

## 2023-11-18 ENCOUNTER — VIRTUAL VISIT (OUTPATIENT)
Dept: URGENT CARE | Facility: CLINIC | Age: 63
End: 2023-11-18
Payer: COMMERCIAL

## 2023-11-18 DIAGNOSIS — U07.1 CLINICAL DIAGNOSIS OF COVID-19: Primary | ICD-10-CM

## 2023-11-18 PROCEDURE — 99441 PR PHYSICIAN TELEPHONE EVALUATION 5-10 MIN: CPT | Mod: 95

## 2023-11-18 NOTE — PROGRESS NOTES
Marcus is a 63 year old who is being evaluated via a billable video visit.      Tested + yesterday.  Sx started 11/16.  First time with COVID.  CKD 2019      Assessment & Plan     Clinical diagnosis of COVID-19    - nirmatrelvir and ritonavir (PAXLOVID) 150 mg/100 mg therapy pack; Take 2 tablets by mouth 2 times daily for 5 days (Take one tablet of Nirmatelvir and 1 tablet of Ritonavir twice daily for 5 days)    COVID-19 positive patient.  Encounter for consideration of medication intervention. Patient does qualify for a prescription. Full discussion with patient including medication options, risks and benefits. Potential drug interactions reviewed with patient.     Treatment Planned Paxlovid RX sent to [facility] [place]    Temporary change to home medications: None    Estimated body mass index is 27.94 kg/m  as calculated from the following:    Height as of 8/21/23: 1.829 m (6').    Weight as of 8/21/23: 93.4 kg (206 lb).  GFR Estimate   Date Value Ref Range Status   06/19/2019 41 (A) >60 ml/min Final     Jimena Arenas MD  Virtual Urgent Care  [facility] VIRTUAL URGENT CARE    Subjective   Marcus is a 63 year old, presenting for the following health issues:  No chief complaint on file.      HPI     Tested + yesterday.  Sx started 11/16.  First time with COVID.  CKD 2019      Review of Systems   Constitutional, HEENT, cardiovascular, pulmonary, GI, , musculoskeletal, neuro, skin, endocrine and psych systems are negative, except as otherwise noted.      Objective           Vitals:  No vitals were obtained today due to virtual visit.    Physical Exam   GENERAL: Healthy, alert and no distress  PSYCH: Mentation appears normal, affect normal/bright, judgement and insight intact, normal speech and appearance well-groomed.    Phone duration #10 minutes.

## 2023-12-12 ENCOUNTER — PATIENT OUTREACH (OUTPATIENT)
Dept: GERIATRIC MEDICINE | Facility: CLINIC | Age: 63
End: 2023-12-12
Payer: COMMERCIAL

## 2023-12-12 NOTE — PROGRESS NOTES
[facility] Care Coordination Contact      [facility] Six-Month Telephone Assessment    6 month telephone assessment completed on 12/12/23.    ER visits: No  Hospitalizations: No  TCU stays: No  Significant health status changes: No concerns.   Falls/Injuries: No  ADL/IADL changes: No  Changes in services: No    Caregiver Assessment follow up:  n/a    Goals: See POC in chart for goal progress documentation.      Will see member in 6 months for an annual health risk assessment.   Encouraged member to call CC with any questions or concerns in the meantime.     Kelley Deluca RN  [facility]  [phone]

## 2024-01-13 PROBLEM — G20.A1 PARKINSON'S DISEASE, UNSPECIFIED WHETHER DYSKINESIA PRESENT, UNSPECIFIED WHETHER MANIFESTATIONS FLUCTUATE (H): Status: ACTIVE | Noted: 2018-07-30

## 2024-01-13 NOTE — PROGRESS NOTES
VIDEO VISIT    Date of Visit: feb 12, 2024  Name: Marcus Daley  Date of Birth 1960  MRN: [id number]  [address] [place] [id number] [phone] (M)   [phone] (H)   [phone] (W)      [email or web address]  Ronel Daley  [phone]     [email or web address] [email or web address]     Granted proxy access to his wife and daughter Neva     Assessment:  (G20) Paralysis agitans (H)  (primary encounter diagnosis)     FROM ANA 12/2021  We saw the patient in 2018 for mild hand tremor.  He also had a history of REM behavior disorder.  We started him on carbidopa levodopa and he has had an excellent response.  He really has had no change over the past 3 years.  He continues to have an excellent response to low-dose carbidopa levodopa 25 101 tablet 3 times a day.  He has virtually no tremor and no signs of progression.  He is happy with his Parkinson's response.  He has had a first cousin who has developed Parkinson's disease but no first-degree relatives.     VOICE CHANGES 2010 dysphonia  RIGHT ARM CHANGES NOTICED 2017 January 2018 CHANGES IN HANDWRITING  6/2018 DIAGNOSIS PARKINSON WITH DR PEREZ - HAD SEEN GENESIS PREVIOUSLY 5/2018     Review of diagnosis    Parkinson's disease      Avoidance of dopamine blockers   Not taking     Motor complication review   No clear wearing off and doing well.      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 years - [employer]  Tries to do daily stretches and aerobics  Has not done big and loud therapy  Keeping active - tries to go in 2/week  And does aerobics and stationary bike     Cognitive/Driving   Driving   DOT physical October 2021 and will need another evaluation October 2022 and will have this done at [facility]      Mood   Will start golfing  again  Mood down this winter  Sun room  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 instruction  No travel plans     Hallucinations/delusions   No shadows     Sleep   Goes to sleep 1130pm  Pretty quickly falls asleep  Snoring  Not clearly talking  Had one rare episode of physical movement  He punched through a pillow one occasion - RBD  Consider sleep consultation  May wake up to go the bathroom at 3am and usually can go back to sleep   Wakes up for the day at 8 or 830am   He is not taking melatonin 3-5mg and titrated upwards for rem sleep disorder     Bladder/Renal/Prostate/Gyn/Other   age related issues  Nocturia 1/noc  No clear urgency or frequency  Some dribbling     GI/Constipation/GERD   Constipation - occasional laxative  Bowel movements are daily or other day  discussion about management of constipation     ENDO/Lipid/DM/Bone density/Thyroid  denies     Cardio/heart/Hyper or Hypotensive   143/81 -   Denies blood pressure problems     Vision/Dry Eyes/Cataracts/Glaucoma/Macular   Denies eye problems     Heme/Anticoagulation/Antiplatelet/Anemia/Other  Not taking an aspirin     ENT/Resp  ANOSMIA/hyposmia  Dysphonia  Muscle tension dysphonia      Skin/Cancer/Seborrhea/other  Denies cancer   Skin screening examination for cancer at some point.      Musculoskeletal/Pain/Headache  Upper spine - stiff neck - manageable with heat or ice     Other:  SEIZURE free since November 2019     Seizure disorder.  The patient had 2 unprovoked seizures at nighttime.  He would scream and thrash and this was observed by his wife.  We started him on levetiracetam and escalated the dose to 1000 mg twice daily.  The last seizure was on 11/1/2019.  He has had no seizures since.  We permitted driving after 6 months of medical compliance and seizure-free interval.  He now wonders about getting a commercial license.  He is looked into it and if we write that he has a seizure disorder and not use the term epilepsy this may qualify him as he is 2 years  seizure-free.     NORMAL HEAD CT SCAN 6/19/2019  1.  Normal exam.       BRAIN MRI 6/26/2019  1. No acute intracranial pathology.  2. Mild patchy white matter changes in the katarina which are similar to the prior study and may represent small vessel ischemic disease..      CERVICAL SPINE MRI 5/17/2018  1. Multilevel cervical spondylosis and degenerative spondylolisthesis. Moderate to advanced neural foraminal stenosis on the left at C6-7.  Moderate neural foraminal stenosis on the left at C3-4 and on the right at C4-5.  2. No significant spinal canal stenosis.  3. No cord signal abnormality.  4. Bilateral atlantooccipital assimilation.     eeg 6/27/2019  This is a normal waking and sleep EEG.     Return to see Dr. Buck and review the carlos Buck visit on 11/4/2022  Continuing on keppra and his level was 24.7 (10-40) on 11/15/2022  Taking levetiracetam keppra 500mg 2 tabs twice daily      Discussed medicare part D and medication coverage.      Discussed the medication schedule     Slight slowness on walking and asymmetric reduced arm swing.        11/3/2023 1045am check in and visit at 11am with Dr. Buck     Reviewed sinemet and discussed increasing it to 1.5 tabs 3/day   Sinemet CR continue on this     No therapy desired at this time     Pharmacy (MTM) consultation and medication management    Debating going somewhere - may visit in [facility]  Weather was good and not overly      Constipation  Consider taking something like senna-docusate or/and polyethylene glycol = miralax or glycolax     Pharmacy (MTM) consultation and medication management         Medications     8a 10a 12p 4/5p 9/930 10p   Carbidopa/levodopa Sinemet CR 50/200         1     Carbidopa/levodopa Sinemet 25/100 1.5   1.5 1.5       Levetiracetam keppra 500mg   1       2   Polyethylene glycol miralax clearlax  prn             Senna senokot 8.6mg  prn                                      "                           Plan:    His sleep is disrupted and sleeps 3 or 4 h ours and urinates and has problems going back to sleep  Lays there for a few hours. Last night slept well. Melatonin - has not been that helpful. Secondary insomnia.  Not sure gabapentin will keep you asleep.   Consider switching sleeping locations and monitor body temperature  Consider sinemet at night if you are stiff.     Treating insomnia can be difficult because the medications we use can be harmful, especially in older adults. In addition, sleep medications do not tend to be very effective and should only be used short-term. Cognitive behavioral therapy (CBT) is the most effective treatment for long-term insomnia. The goal of CBT for insomnia is to address the underlying causes of the sleep problems, including your habits and how you think about sleep. You can do CBT with a trained psychologist, or from the comfort of your home by following an online program or workbook. Here are some great resources for at-home CBT:    1) 6-week online CBT course through [facility] for $40: [employer]/sleep  2) \"Say Rochelle to Insomnia\" by Nelson Fowler, PhD at [employer]: 6-week program  3) \"Overcoming Insomnia: A Cognitive-Behavioral Therapy Approach Workbook\" by Chaim De Luna and Adelaida Hubbard  4) \"Quiet Your Mind and Get to Sleep: Solutions to Insomnia for Those with Depression, Anxiety or Chronic Pain ([place] Self-Help Workbook) by Adelaida Hubbard and Hyun Baker    He continues on keppra 500mg in the morning and 2 x 500mg at night.     No problems with thinking or driving    He is a member of 2 health clubs - now he is 63 years of age and getting there 2 or 3 times per week and has a stationary bike and is fairly active.     He has some good days playing the [employer] and some days that things are not as good  He does not have clear wearing off.   He is taking 1.5 tabs 3/day  He is also taking " the long acting sinemet 50/200  No falls  No freezing  Discussed research studies proprioception   He may be interested in projects.     Bowel habits have been pretty good - rare constipation. May take a powder laxative weekly or/and senokot 2 or 3 times per week.     No significant bladder urinary urgency and has nocturia 1/noc    Readers with his vision - he has not had cataract surgery    Cousin had parkinson   Would recommend free genetic testing  [email or web address]   [phone]  [email or web address]://[email or web address]/advancing-research/[email or web address]-research/pdgeneration    He has a bit of voice involvement - muscle tension dysphonia/dystonia  He will wait on discussing the research - vibrating device that dr Luna and others are working on at the [place]. If interested in the future can reconsider th is.     He is considering part time work to get him out of the house.   Medical     Medical Decision Making:  #  Chronic progressive medical conditions addressed  yes  Review and/or interpretation of unique test or documentation from a provider outside of neurology no   Independent historian provided additional details  yes   Prescription drug management and review of potential side effects and/or monitoring for side effects  yes   Health impacted by social determinants of health  no    I have reviewed the note as documented above.  This accurately captures the substance of my conversation with the patient and total time spent preparing for visit, executing visit and completing visit on the day of the visit:  40 minutes.     The longitudinal plan of care for Marcus Daley was addressed during this visit. Due to the added complexity in care, I will continue to support Marcus BAZZI Thuy in the subsequent management of this condition(s) and with the ongoing continuity of care of this condition(s).    Sreedhar Kathleen  "MD      ------------------------------------------------------------------------------------------------------------------------------------------------------------------------    Video-Visit Details    The patient has been notified of following:     \"After a review of the patient s situation, this visit was changed from an in-person visit to a video visit to reduce the risk of COVID-19 exposure.   The patient is being evaluated via a billable video visit.\"    \"This video visit will be conducted via a call between you and your physician/provider. We have found that certain health care needs can be provided without the need for an in-person physical exam.  This service lets us provide the care you need with a video conversation.  If a prescription is necessary we can send it directly to your pharmacy.  If lab work is needed we can place an order for that and you can then stop by our lab to have the test done at a later time.    If during the course of the call the physician/provider feels a video visit is not appropriate, you will not be charged for this service.\"     Patient has given verbal consent for Video visit? Yes    Patient would like the video invitation sent by:     Type of service:  Video Visit    Video Start Time:     Video End Time (time video stopped):     Duration:  minutes - see above    Originating Location (pt. Location):     Distant Location (provider location):  [facility] NEUROLOGY [facility]     Mode of Communication:  Video Conference via [employer] (and if not possible then doximity)      Sreedhar Kathleen MD      --------------------------------------------------------------------------------------------------------------    Marcus Daley is a 63 year old male who is being evaluated via a billable video visit.      Charts reviewed  Consult from  Images reviewed        I have reviewed and updated the patient's Past Medical History, Social History, Family History and Medication " List.    ALLERGIES  Patient has no known allergies.    Lasts visit details if there was a last visit:       14 Review of systems  are negative except for   Patient Active Problem List   Diagnosis    Paralysis agitans    Parkinson's disease, unspecified whether dyskinesia present, unspecified whether manifestations fluctuate    Bilateral inguinal hernia    Seizure disorder (H)      No Known Allergies  Past Surgical History:   Procedure Laterality Date    COLONOSCOPY      COLONOSCOPY N/A 4/13/2023    Procedure: COLONOSCOPY, FLEXIBLE, WITH LESION REMOVAL USING SNARE;  Surgeon: Kalen Rose MD;  Location: [facility] OR    COLONOSCOPY WITH CO2 INSUFFLATION N/A 4/13/2023    Procedure: COLONOSCOPY, WITH CO2 INSUFFLATION;  Surgeon: Kalen Rose MD;  Location: [facility] OR    TONSILLECTOMY      age 2-3 years     Past Medical History:   Diagnosis Date    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 Not Asked   Social History Narrative    Copier/field service repair/computer repair for 20-30 years        Daughter lives in [place]         Ronel retired wife retired  for [employer]        Lives in [place]         Social Determinants of Health     Financial Resource Strain: Not on file   Food Insecurity: Not on file   Transportation Needs: Not on file   Physical Activity: Not on file   Stress: Not on file   Social Connections:  Not on file   Interpersonal Safety: Not on file   Housing Stability: Not on file     Family History   Problem Relation Age of Onset    Suicide Mother     Depression Mother     Other - See Comments Mother     Hypertension Father     Heart Disease Father     Other - See Comments Brother         fell 12 feet and hit head on pavement    Other - See Comments Brother         lives [facility]    Lung Cancer Brother         small cell lung cancer    Parkinsonism Paternal Cousin     Other - See Comments Daughter         lives in [place]     Current Outpatient Medications   Medication Sig Dispense Refill    carbidopa-levodopa (SINEMET CR)  MG CR tablet Take 1 tablet by mouth At Bedtime 92 tablet 3    carbidopa-levodopa (SINEMET)  MG tablet Take 1.5 tablets by mouth 3 times daily 420 tablet 3    levETIRAcetam (KEPPRA) 500 MG tablet TAKE 2 TABLETS BY MOUTH TWICE DAILY 360 tablet 0

## 2024-01-19 ENCOUNTER — VIRTUAL VISIT (OUTPATIENT)
Dept: NEUROLOGY | Facility: CLINIC | Age: 64
End: 2024-01-19
Payer: COMMERCIAL

## 2024-01-19 VITALS — BODY MASS INDEX: 27.09 KG/M2 | WEIGHT: 200 LBS | HEIGHT: 72 IN

## 2024-01-19 DIAGNOSIS — G40.909 SEIZURE DISORDER (H): ICD-10-CM

## 2024-01-19 PROCEDURE — 99213 OFFICE O/P EST LOW 20 MIN: CPT | Mod: 95 | Performed by: PSYCHIATRY & NEUROLOGY

## 2024-01-19 RX ORDER — LEVETIRACETAM 500 MG/1
TABLET ORAL
Qty: 270 TABLET | Refills: 3 | Status: SHIPPED | OUTPATIENT
Start: 2024-01-19 | End: 2024-07-26

## 2024-01-19 ASSESSMENT — PAIN SCALES - GENERAL: PAINLEVEL: NO PAIN (0)

## 2024-01-19 NOTE — NURSING NOTE
Is the patient currently in the state of [place]? YES    Visit mode:VIDEO    If the visit is dropped, the patient can be reconnected by: VIDEO VISIT: Text to cell phone:   Telephone Information:   Mobile [phone]       Will anyone else be joining the visit? NO  (If patient encounters technical issues they should call [phone]    How would you like to obtain your AVS? MyChart    Are changes needed to the allergy or medication list? Pt stated no changes to allergies and Pt stated no med changes    Reason for visit: KRISTIE LUNDBERGF

## 2024-01-19 NOTE — PROGRESS NOTES
NEUROLOGY PROGRESS NOTE   [facility]    Patient:Marcus Daley  : 1960  Age: 63 year old  Today's Virtual Visit: 2024    History of present illness:     Mr. Marcus Daley Is participating in this virtual visit for follow-up on his epilepsy.    He reports no seizures since 2019.  He is taking levetiracetam 1000 mg bid.    Last Keppra level 11/15/22: 24.7.       Patient also has REM sleep behavior disorder.  His seizures were different than his REM-sleep behavioral disorder, were more violent and longer and he bit his tongue.      Current Outpatient Medications   Medication Sig Dispense Refill     carbidopa-levodopa (SINEMET CR)  MG CR tablet Take 1 tablet by mouth At Bedtime 92 tablet 3     carbidopa-levodopa (SINEMET)  MG tablet Take 1.5 tablets by mouth 3 times daily 420 tablet 3     levETIRAcetam (KEPPRA) 500 MG tablet TAKE 2 TABLETS BY MOUTH TWICE DAILY 360 tablet 0     Exam:    Ht 1.829 m (6')   Wt 90.7 kg (200 lb)   BMI 27.12 kg/m       Wt Readings from Last 5 Encounters:   24 90.7 kg (200 lb)   23 93.4 kg (206 lb)   23 93.4 kg (206 lb)   22 95.3 kg (210 lb)   19 95.3 kg (210 lb)      Alert, awake, NAD, no aphasia or dysarthria, EOMI, face is symmetric, moves upper extremities against gravity.    Assessment/Plan:    1. Epilepsy, nocturnal seizures x2: Seizures are controlled on levetiracetam monotherapy, no seizures for ~4 yrs. I suggested to decrease levetiracetam to 500 mg am and 1000 mg pm.    - Decrease levetiracetam to 500-1000 mg/d.  - Obtain levetiracetam level for efficacy and toxicity in 1 week.      As described above, I met with the patient for 11 minutes and during this time counseling was greater than 50% of the visit time.    Aidee Buck MD

## 2024-01-19 NOTE — LETTER
2024         RE: Marcus Daley  [address] [place] [address]        Dear Colleague,    Thank you for referring your patient, Marcus Daley, to the [facility] NEUROLOGY CLINIC [place]. Please see a copy of my visit note below.     NEUROLOGY PROGRESS NOTE   [facility]    Patient:Marcus Daley  : 1960  Age: 63 year old  Today's Virtual Visit: 2024    History of present illness:     Mr. Marcus Daley Is participating in this virtual visit for follow-up on his epilepsy.    He reports no seizures since 2019.  He is taking levetiracetam 1000 mg bid.    Last Keppra level 11/15/22: 24.7.       Patient also has REM sleep behavior disorder.  His seizures were different than his REM-sleep behavioral disorder, were more violent and longer and he bit his tongue.      Current Outpatient Medications   Medication Sig Dispense Refill     carbidopa-levodopa (SINEMET CR)  MG CR tablet Take 1 tablet by mouth At Bedtime 92 tablet 3     carbidopa-levodopa (SINEMET)  MG tablet Take 1.5 tablets by mouth 3 times daily 420 tablet 3     levETIRAcetam (KEPPRA) 500 MG tablet TAKE 2 TABLETS BY MOUTH TWICE DAILY 360 tablet 0     Exam:    Ht 1.829 m (6')   Wt 90.7 kg (200 lb)   BMI 27.12 kg/m       Wt Readings from Last 5 Encounters:   24 90.7 kg (200 lb)   23 93.4 kg (206 lb)   23 93.4 kg (206 lb)   22 95.3 kg (210 lb)   19 95.3 kg (210 lb)      Alert, awake, NAD, no aphasia or dysarthria, EOMI, face is symmetric, moves upper extremities against gravity.    Assessment/Plan:    1. Epilepsy, nocturnal seizures x2: Seizures are controlled on levetiracetam monotherapy, no seizures for ~4 yrs. I suggested to decrease levetiracetam to 500 mg am and 1000 mg pm.    - Decrease levetiracetam to 500-1000 mg/d.  - Obtain levetiracetam level for efficacy and toxicity in 1 week.      As described above, I met with the patient for 11 minutes and  during this time counseling was greater than 50% of the visit time.    Aidee Buck MD

## 2024-02-06 ENCOUNTER — LAB (OUTPATIENT)
Dept: LAB | Facility: CLINIC | Age: 64
End: 2024-02-06
Payer: COMMERCIAL

## 2024-02-06 DIAGNOSIS — G40.909 SEIZURE DISORDER (H): ICD-10-CM

## 2024-02-06 PROCEDURE — 36415 COLL VENOUS BLD VENIPUNCTURE: CPT

## 2024-02-06 PROCEDURE — 80177 DRUG SCRN QUAN LEVETIRACETAM: CPT

## 2024-02-07 LAB — LEVETIRACETAM SERPL-MCNC: 24.8 ΜG/ML (ref 10–40)

## 2024-02-12 ENCOUNTER — VIRTUAL VISIT (OUTPATIENT)
Dept: NEUROLOGY | Facility: CLINIC | Age: 64
End: 2024-02-12
Payer: COMMERCIAL

## 2024-02-12 DIAGNOSIS — G20.A1 PARKINSON'S DISEASE, UNSPECIFIED WHETHER DYSKINESIA PRESENT, UNSPECIFIED WHETHER MANIFESTATIONS FLUCTUATE (H): Primary | ICD-10-CM

## 2024-02-12 PROCEDURE — 99215 OFFICE O/P EST HI 40 MIN: CPT | Mod: 95 | Performed by: PSYCHIATRY & NEUROLOGY

## 2024-02-12 PROCEDURE — G2211 COMPLEX E/M VISIT ADD ON: HCPCS | Mod: 95 | Performed by: PSYCHIATRY & NEUROLOGY

## 2024-02-12 RX ORDER — CARBIDOPA AND LEVODOPA 25; 100 MG/1; MG/1
1.5 TABLET ORAL 3 TIMES DAILY
Qty: 420 TABLET | Refills: 3 | Status: SHIPPED | OUTPATIENT
Start: 2024-02-12 | End: 2024-08-26

## 2024-02-12 RX ORDER — SENNOSIDES A AND B 8.6 MG/1
1 TABLET, FILM COATED ORAL DAILY PRN
COMMUNITY
Start: 2024-02-12

## 2024-02-12 RX ORDER — POLYETHYLENE GLYCOL 3350 17 G/17G
1 POWDER, FOR SOLUTION ORAL DAILY PRN
COMMUNITY
Start: 2024-02-12

## 2024-02-12 RX ORDER — CARBIDOPA AND LEVODOPA 50; 200 MG/1; MG/1
1 TABLET, EXTENDED RELEASE ORAL AT BEDTIME
Qty: 92 TABLET | Refills: 3 | Status: SHIPPED | OUTPATIENT
Start: 2024-02-12 | End: 2024-08-26

## 2024-02-12 NOTE — PROGRESS NOTES
Marcus is a 63 year old who is being evaluated via a billable video visit.      How would you like to obtain your AVS? MyChart  If the video visit is dropped, the invitation should be resent by: Send to e-mail at: [email or web address]  Will anyone else be joining your video visit? No        Video-Visit Details    Type of service:  Video Visit   Video Start Time:   Video End Time:    Originating Location (pt. Location):     Distant Location (provider location):    Platform used for Video Visit:   KENNETH Kasper, MARCO ANTONIO ([facility])

## 2024-02-12 NOTE — PATIENT INSTRUCTIONS
"       Medications     8a 10a 12p 4/5p 9/930 10p   Carbidopa/levodopa Sinemet CR 50/200         1     Carbidopa/levodopa Sinemet 25/100 1.5   1.5 1.5       Levetiracetam keppra 500mg   1       2   Polyethylene glycol miralax clearlax  prn             Senna senokot 8.6mg  prn                                                                Plan:    His sleep is disrupted and sleeps 3 or 4 h ours and urinates and has problems going back to sleep  Lays there for a few hours. Last night slept well. Melatonin - has not been that helpful. Secondary insomnia.  Not sure gabapentin will keep you asleep.   Consider switching sleeping locations and monitor body temperature  Consider sinemet at night if you are stiff.     Treating insomnia can be difficult because the medications we use can be harmful, especially in older adults. In addition, sleep medications do not tend to be very effective and should only be used short-term. Cognitive behavioral therapy (CBT) is the most effective treatment for long-term insomnia. The goal of CBT for insomnia is to address the underlying causes of the sleep problems, including your habits and how you think about sleep. You can do CBT with a trained psychologist, or from the comfort of your home by following an online program or workbook. Here are some great resources for at-home CBT:    1) 6-week online CBT course through [facility] for $40: [employer]/sleep  2) \"Say Rochelle to Insomnia\" by Nelson Fowler, PhD at [employer]: 6-week program  3) \"Overcoming Insomnia: A Cognitive-Behavioral Therapy Approach Workbook\" by Chaim De Luna and Adelaida Hubbard  4) \"Quiet Your Mind and Get to Sleep: Solutions to Insomnia for Those with Depression, Anxiety or Chronic Pain ([place] Self-Help Workbook) by Adelaida Hubbard and Hyun Baker    He continues on keppra 500mg in the morning and 2 x 500mg at night.     No problems with thinking or driving    He is a " member of 2 health clubs - now he is 63 years of age and getting there 2 or 3 times per week and has a stationary bike and is fairly active.     He has some good days playing the guitar and some days that things are not as good  He does not have clear wearing off.   He is taking 1.5 tabs 3/day  He is also taking the long acting sinemet 50/200  No falls  No freezing  Discussed research studies proprioception   He may be interested in projects.     Bowel habits have been pretty good - rare constipation. May take a powder laxative weekly or/and senokot 2 or 3 times per week.     No significant bladder urinary urgency and has nocturia 1/noc    Readers with his vision - he has not had cataract surgery    Cousin had parkinson   Would recommend free genetic testing  [email or web address]   [phone]  [email or web address]/[email or web address]-[email or web address]-[email or web address]    He has a bit of voice involvement - muscle tension dysphonia/dystonia  He will wait on discussing the research - vibrating device that dr Luna and others are working on at the [place]. If interested in the future can reconsider th is.     He is considering part time work to get him out of the house.   Medical francis

## 2024-02-12 NOTE — LETTER
2/12/2024         RE: Marcus Daley  [address] [place] [address]        Dear Colleague,    Thank you for referring your patient, Marcus Daley, to the [facility] NEUROLOGY CLIN[facility] [place]. Please see a copy of my visit note below.        VIDEO VISIT    Date of Visit: feb 12, 2024  Name: Marcus Daley  Date of Birth 1960  MRN: [id number]  [address] [place] [id number] [phone] (M)   [phone] (H)   [phone] (W)      [email or web address]  Ronel Thuy  [phone]     [email or web address]     Granted proxy access to his wife and daughter Neva     Assessment:  (G20) Paralysis agitans (H)  (primary encounter diagnosis)     FROM ANA 12/2021  We saw the patient in 2018 for mild hand tremor.  He also had a history of REM behavior disorder.  We started him on carbidopa levodopa and he has had an excellent response.  He really has had no change over the past 3 years.  He continues to have an excellent response to low-dose carbidopa levodopa 25 101 tablet 3 times a day.  He has virtually no tremor and no signs of progression.  He is happy with his Parkinson's response.  He has had a first cousin who has developed Parkinson's disease but no first-degree relatives.     VOICE CHANGES 2010 dysphonia  RIGHT ARM CHANGES NOTICED 2017 January 2018 CHANGES IN HANDWRITING  6/2018 DIAGNOSIS PARKINSON WITH DR PEREZ - HAD SEEN GENESIS PREVIOUSLY 5/2018     Review of diagnosis    Parkinson's disease      Avoidance of dopamine blockers   Not taking     Motor complication review   No clear wearing off and doing well.      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 years - [employer]  Tries to do daily stretches and aerobics  Has not done big and loud  therapy  Keeping active - tries to go in 2/week  And does aerobics and stationary bike     Cognitive/Driving   Driving   DOT physical October 2021 and will need another evaluation October 2022 and will have this done [facility]      Mood   Will start golfing again  Mood down this winter  Sun room  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 instruction  No travel plans     Hallucinations/delusions   No shadows     Sleep   Goes to sleep 1130pm  Pretty quickly falls asleep  Snoring  Not clearly talking  Had one rare episode of physical movement  He punched through a pillow one occasion - RBD  Consider sleep consultation  May wake up to go the bathroom at 3am and usually can go back to sleep   Wakes up for the day at 8 or 830am   He is not taking melatonin 3-5mg and titrated upwards for rem sleep disorder     Bladder/Renal/Prostate/Gyn/Other   age related issues  Nocturia 1/noc  No clear urgency or frequency  Some dribbling     GI/Constipation/GERD   Constipation - occasional laxative  Bowel movements are daily or other day  discussion about management of constipation     ENDO/Lipid/DM/Bone density/Thyroid  denies     Cardio/heart/Hyper or Hypotensive   143/81 -   Denies blood pressure problems     Vision/Dry Eyes/Cataracts/Glaucoma/Macular   Denies eye problems     Heme/Anticoagulation/Antiplatelet/Anemia/Other  Not taking an aspirin     ENT/Resp  ANOSMIA/hyposmia  Dysphonia  Muscle tension dysphonia      Skin/Cancer/Seborrhea/other  Denies cancer   Skin screening examination for cancer at some point.      Musculoskeletal/Pain/Headache  Upper spine - stiff neck - manageable with heat or ice     Other:  SEIZURE free since November 2019     Seizure disorder.  The patient had 2 unprovoked seizures at nighttime.  He would scream and thrash and this was observed by his wife.  We started him on levetiracetam and escalated the dose to 1000 mg twice daily.  The last seizure was on 11/1/2019.  He has had no seizures since.  We permitted  driving after 6 months of medical compliance and seizure-free interval.  He now wonders about getting a commercial license.  He is looked into it and if we write that he has a seizure disorder and not use the term epilepsy this may qualify him as he is 2 years seizure-free.     NORMAL HEAD CT SCAN 6/19/2019  1.  Normal exam.       BRAIN MRI 6/26/2019  1. No acute intracranial pathology.  2. Mild patchy white matter changes in the katarina which are similar to the prior study and may represent small vessel ischemic disease..      CERVICAL SPINE MRI 5/17/2018  1. Multilevel cervical spondylosis and degenerative spondylolisthesis. Moderate to advanced neural foraminal stenosis on the left at C6-7.  Moderate neural foraminal stenosis on the left at C3-4 and on the right at C4-5.  2. No significant spinal canal stenosis.  3. No cord signal abnormality.  4. Bilateral atlantooccipital assimilation.     eeg 6/27/2019  This is a normal waking and sleep EEG.     Return to see Dr. Buck and review the carlos Buck visit on 11/4/2022  Continuing on keppra and his level was 24.7 (10-40) on 11/15/2022  Taking levetiracetam keppra 500mg 2 tabs twice daily      Discussed medicare part D and medication coverage.      Discussed the medication schedule     Slight slowness on walking and asymmetric reduced arm swing.        11/3/2023 1045am check in and visit at 11am with Dr. Buck     Reviewed sinemet and discussed increasing it to 1.5 tabs 3/day   Sinemet CR continue on this     No therapy desired at this time     Pharmacy (MTM) consultation and medication management    Debating going somewhere - may visit in [facility]  Weather was good and not overly      Constipation  Consider taking something like senna-docusate or/and polyethylene glycol = miralax or glycolax     Pharmacy (MTM) consultation and medication management         Medications     8a 10a 12p 4/5p 9/930 10p  "  Carbidopa/levodopa Sinemet CR 50/200         1     Carbidopa/levodopa Sinemet 25/100 1.5   1.5 1.5       Levetiracetam keppra 500mg   1       2   Polyethylene glycol miralax clearlax  prn             Senna senokot 8.6mg  prn                                                                Plan:    His sleep is disrupted and sleeps 3 or 4 h ours and urinates and has problems going back to sleep  Lays there for a few hours. Last night slept well. Melatonin - has not been that helpful. Secondary insomnia.  Not sure gabapentin will keep you asleep.   Consider switching sleeping locations and monitor body temperature  Consider sinemet at night if you are stiff.     Treating insomnia can be difficult because the medications we use can be harmful, especially in older adults. In addition, sleep medications do not tend to be very effective and should only be used short-term. Cognitive behavioral therapy (CBT) is the most effective treatment for long-term insomnia. The goal of CBT for insomnia is to address the underlying causes of the sleep problems, including your habits and how you think about sleep. You can do CBT with a trained psychologist, or from the comfort of your home by following an online program or workbook. Here are some great resources for at-home CBT:    1) 6-week online CBT course through [facility] for $40: [employer]/sleep  2) \"Say Rochelle to Insomnia\" by Nelson Fowler, PhD at [employer]: 6-week program  3) \"Overcoming Insomnia: A Cognitive-Behavioral Therapy Approach Workbook\" by Chaim De Luna and Adelaida Hubbard  4) \"Quiet Your Mind and Get to Sleep: Solutions to Insomnia for Those with Depression, Anxiety or Chronic Pain ([place] Self-Help Workbook) by Adelaida Hubbard and Hyun Baker    He continues on keppra 500mg in the morning and 2 x 500mg at night.     No problems with thinking or driving    He is a member of 2 health clubs - now he is 63 years of " age and getting there 2 or 3 times per week and has a stationary bike and is fairly active.     He has some good days playing the guitar and some days that things are not as good  He does not have clear wearing off.   He is taking 1.5 tabs 3/day  He is also taking the long acting sinemet 50/200  No falls  No freezing  Discussed research studies proprioception   He may be interested in projects.     Bowel habits have been pretty good - rare constipation. May take a powder laxative weekly or/and senokot 2 or 3 times per week.     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 If interested in the future can reconsider th is.     He is considering part time work to get him out of the house.   Medical     Medical Decision Making:  #  Chronic progressive medical conditions addressed  yes  Review and/or interpretation of unique test or documentation from a provider outside of neurology no   Independent historian provided additional details  yes   Prescription drug management and review of potential side effects and/or monitoring for side effects  yes   Health impacted by social determinants of health  no    I have reviewed the note as documented above.  This accurately captures the substance of my conversation with the patient and total time spent preparing for visit, executing visit and completing visit on the day of the visit:  40 minutes.     The longitudinal plan of care for Marcus Daley was addressed during this visit. Due to the added complexity in care, I  "will continue to support Marcus Daley in the subsequent management of this condition(s) and with the ongoing continuity of care of this condition(s).    Seredhar Kathleen MD      ------------------------------------------------------------------------------------------------------------------------------------------------------------------------    Video-Visit Details    The patient has been notified of following:     \"After a review of the patient s situation, this visit was changed from an in-person visit to a video visit to reduce the risk of COVID-19 exposure.   The patient is being evaluated via a billable video visit.\"    \"This video visit will be conducted via a call between you and your physician/provider. We have found that certain health care needs can be provided without the need for an in-person physical exam.  This service lets us provide the care you need with a video conversation.  If a prescription is necessary we can send it directly to your pharmacy.  If lab work is needed we can place an order for that and you can then stop by our lab to have the test done at a later time.    If during the course of the call the physician/provider feels a video visit is not appropriate, you will not be charged for this service.\"     Patient has given verbal consent for Video visit? Yes    Patient would like the video invitation sent by:     Type of service:  Video Visit    Video Start Time:     Video End Time (time video stopped):     Duration:  minutes - see above    Originating Location (pt. Location):     Distant Location (provider location):  [facility] NEUROLOGY [facility]     Mode of Communication:  Video Conference via [employer] (and if not possible then [facility])      Sreedhar Kathleen MD      --------------------------------------------------------------------------------------------------------------    Marcus Daley is a 63 year old male who is being evaluated via a billable video visit.  "     Charts reviewed  Consult from  Images reviewed        I have reviewed and updated the patient's Past Medical History, Social History, Family History and Medication List.    ALLERGIES  Patient has no known allergies.    Lasts visit details if there was a last visit:       14 Review of systems  are negative except for   Patient Active Problem List   Diagnosis     Paralysis agitans     Parkinson's disease, unspecified whether dyskinesia present, unspecified whether manifestations fluctuate     Bilateral inguinal hernia     Seizure disorder (H)      No Known Allergies  Past Surgical History:   Procedure Laterality Date     COLONOSCOPY       COLONOSCOPY N/A 4/13/2023    Procedure: COLONOSCOPY, FLEXIBLE, WITH LESION REMOVAL USING SNARE;  Surgeon: Kalen Rose MD;  Location: [facility] OR     COLONOSCOPY WITH CO2 INSUFFLATION N/A 4/13/2023    Procedure: COLONOSCOPY, WITH CO2 INSUFFLATION;  Surgeon: Kalen Rose MD;  Location: [facility] OR     TONSILLECTOMY      age 2-3 years     Past Medical History:   Diagnosis Date     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 Not Asked   Social History Narrative    Copier/field service repair/computer repair for 20-30 years        Daughter lives in [place]         Ronel retired wife retired  for [employer]        Lives in [place]         Social Determinants of  Health     Financial Resource Strain: Not on file   Food Insecurity: Not on file   Transportation Needs: Not on file   Physical Activity: Not on file   Stress: Not on file   Social Connections: Not on file   Interpersonal Safety: Not on file   Housing Stability: Not on file     Family History   Problem Relation Age of Onset     Suicide Mother      Depression Mother      Other - See Comments Mother      Hypertension Father      Heart Disease Father      Other - See Comments Brother         fell 12 feet and hit head on pavement     Other - See Comments Brother         lives [facility]     Lung Cancer Brother         small cell lung cancer     Parkinsonism Paternal Cousin      Other - See Comments Daughter         lives in [place]     Current Outpatient Medications   Medication Sig Dispense Refill     carbidopa-levodopa (SINEMET CR)  MG CR tablet Take 1 tablet by mouth At Bedtime 92 tablet 3     carbidopa-levodopa (SINEMET)  MG tablet Take 1.5 tablets by mouth 3 times daily 420 tablet 3     levETIRAcetam (KEPPRA) 500 MG tablet TAKE 2 TABLETS BY MOUTH TWICE DAILY 360 tablet 0         Marcus is a 63 year old who is being evaluated via a billable video visit.      How would you like to obtain your AVS? MyChart  If the video visit is dropped, the invitation should be resent by: Send to e-mail at: [email or web address]  Will anyone else be joining your video visit? No        Video-Visit Details    Type of service:  Video Visit   Video Start Time:   Video End Time:    Originating Location (pt. Location):     Distant Location (provider location):    Platform used for Video Visit:   KENNETH Kasper, MARCO ANTONIO ([facility])          Again, thank you for allowing me to participate in the care of your patient.        Sincerely,        Sreedhar Katlheen MD

## 2024-02-13 ENCOUNTER — TELEPHONE (OUTPATIENT)
Dept: NEUROLOGY | Facility: CLINIC | Age: 64
End: 2024-02-13
Payer: COMMERCIAL

## 2024-02-13 NOTE — TELEPHONE ENCOUNTER
Left Voicemail (1st Attempt) for the patient to call back and schedule the following:    Appointment type: Return Movement  Provider: Dr. Kathleen  Return date: August 2024  Specialty phone number: [phone]  Additional appointment(s) needed:   Additonal Notes:    Provider out of clinic 8/12/2024, the appointment needs to be rescheduled. Also sent a [employer] Guide message.    Harmony PETERSON/Complex Procedure    [facility]   Neurology, NeuroSurgery, NeuroPsychology, Pain Management and Cardiology Specialties  Medical/Surgical Adult Specialties

## 2024-03-05 ENCOUNTER — PATIENT OUTREACH (OUTPATIENT)
Dept: GERIATRIC MEDICINE | Facility: CLINIC | Age: 64
End: 2024-03-05
Payer: COMMERCIAL

## 2024-03-05 SDOH — HEALTH STABILITY: PHYSICAL HEALTH: ON AVERAGE, HOW MANY MINUTES DO YOU ENGAGE IN EXERCISE AT THIS LEVEL?: 60 MIN

## 2024-03-05 SDOH — HEALTH STABILITY: PHYSICAL HEALTH: ON AVERAGE, HOW MANY DAYS PER WEEK DO YOU ENGAGE IN MODERATE TO STRENUOUS EXERCISE (LIKE A BRISK WALK)?: 5 DAYS

## 2024-03-05 ASSESSMENT — SOCIAL DETERMINANTS OF HEALTH (SDOH): HOW OFTEN DO YOU GET TOGETHER WITH FRIENDS OR RELATIVES?: ONCE A WEEK

## 2024-03-05 NOTE — PROGRESS NOTES
[facility] Care Coordination Contact      [facility] Six-Month Telephone Assessment    6 month telephone assessment completed on 03/05/2024.    ER visits: No  Hospitalizations: No  TCU stays: No  Significant health status changes: no concerns.  Falls/Injuries: No  ADL/IADL changes: No  Changes in services: No    Caregiver Assessment follow up:  n/a    Goals: See POC in chart for goal progress documentation.      Will see member in 6 months for an annual health risk assessment.   Encouraged member to call CC with any questions or concerns in the meantime.     Kelley Deluca RN  [facility]  [phone]

## 2024-03-06 ENCOUNTER — OFFICE VISIT (OUTPATIENT)
Dept: FAMILY MEDICINE | Facility: CLINIC | Age: 64
End: 2024-03-06
Payer: COMMERCIAL

## 2024-03-06 VITALS
SYSTOLIC BLOOD PRESSURE: 156 MMHG | DIASTOLIC BLOOD PRESSURE: 80 MMHG | TEMPERATURE: 98 F | WEIGHT: 206 LBS | HEIGHT: 72 IN | BODY MASS INDEX: 27.9 KG/M2 | OXYGEN SATURATION: 100 % | HEART RATE: 79 BPM | RESPIRATION RATE: 18 BRPM

## 2024-03-06 DIAGNOSIS — Z12.5 PROSTATE CANCER SCREENING: ICD-10-CM

## 2024-03-06 DIAGNOSIS — Z11.59 NEED FOR HEPATITIS C SCREENING TEST: ICD-10-CM

## 2024-03-06 DIAGNOSIS — Z23 NEED FOR VACCINATION: ICD-10-CM

## 2024-03-06 DIAGNOSIS — Z29.11 NEED FOR VACCINATION AGAINST RESPIRATORY SYNCYTIAL VIRUS: ICD-10-CM

## 2024-03-06 DIAGNOSIS — K40.20 NON-RECURRENT BILATERAL INGUINAL HERNIA WITHOUT OBSTRUCTION OR GANGRENE: ICD-10-CM

## 2024-03-06 DIAGNOSIS — Z00.00 ENCOUNTER FOR MEDICARE ANNUAL WELLNESS EXAM: Primary | ICD-10-CM

## 2024-03-06 DIAGNOSIS — Z11.4 SCREENING FOR HIV (HUMAN IMMUNODEFICIENCY VIRUS): ICD-10-CM

## 2024-03-06 DIAGNOSIS — Z23 NEED FOR SHINGLES VACCINE: ICD-10-CM

## 2024-03-06 DIAGNOSIS — R03.0 ELEVATED BLOOD PRESSURE READING WITHOUT DIAGNOSIS OF HYPERTENSION: ICD-10-CM

## 2024-03-06 DIAGNOSIS — G20.A1 PARKINSON'S DISEASE WITHOUT DYSKINESIA OR FLUCTUATING MANIFESTATIONS (H): ICD-10-CM

## 2024-03-06 DIAGNOSIS — Z28.21 VACCINATION NOT CARRIED OUT BECAUSE OF PATIENT REFUSAL: ICD-10-CM

## 2024-03-06 PROCEDURE — G0103 PSA SCREENING: HCPCS | Performed by: FAMILY MEDICINE

## 2024-03-06 PROCEDURE — G0009 ADMIN PNEUMOCOCCAL VACCINE: HCPCS | Performed by: FAMILY MEDICINE

## 2024-03-06 PROCEDURE — 90480 ADMN SARSCOV2 VAC 1/ONLY CMP: CPT | Performed by: FAMILY MEDICINE

## 2024-03-06 PROCEDURE — 86803 HEPATITIS C AB TEST: CPT | Performed by: FAMILY MEDICINE

## 2024-03-06 PROCEDURE — G0402 INITIAL PREVENTIVE EXAM: HCPCS | Performed by: FAMILY MEDICINE

## 2024-03-06 PROCEDURE — 91320 SARSCV2 VAC 30MCG TRS-SUC IM: CPT | Performed by: FAMILY MEDICINE

## 2024-03-06 PROCEDURE — 80061 LIPID PANEL: CPT | Performed by: FAMILY MEDICINE

## 2024-03-06 PROCEDURE — 90677 PCV20 VACCINE IM: CPT | Performed by: FAMILY MEDICINE

## 2024-03-06 PROCEDURE — 87389 HIV-1 AG W/HIV-1&-2 AB AG IA: CPT | Performed by: FAMILY MEDICINE

## 2024-03-06 PROCEDURE — 36415 COLL VENOUS BLD VENIPUNCTURE: CPT | Performed by: FAMILY MEDICINE

## 2024-03-06 PROCEDURE — 80048 BASIC METABOLIC PNL TOTAL CA: CPT | Performed by: FAMILY MEDICINE

## 2024-03-06 PROCEDURE — 99213 OFFICE O/P EST LOW 20 MIN: CPT | Mod: 25 | Performed by: FAMILY MEDICINE

## 2024-03-06 RX ORDER — RESPIRATORY SYNCYTIAL VIRUS VACCINE 120MCG/0.5
0.5 KIT INTRAMUSCULAR ONCE
Qty: 1 EACH | Refills: 0 | Status: CANCELLED | OUTPATIENT
Start: 2024-03-06 | End: 2024-03-06

## 2024-03-06 ASSESSMENT — PAIN SCALES - GENERAL: PAINLEVEL: NO PAIN (0)

## 2024-03-06 NOTE — PATIENT INSTRUCTIONS
At [facility], we strive to deliver an exceptional experience to you, every time we see you. If you receive a survey, please complete it as we do value your feedback.  If you have MyChart, you can expect to receive results automatically within 24 hours of their completion.  Your provider will send a note interpreting your results as well.   If you do not have MyChart, you should receive your results in about a week by mail.    Your care team:                            Family Medicine Internal Medicine   MD Oseas Castellano, MD Lara Hart, MD Roscoe Rodríguez, MD Sandy Morales, PA-C    Ovi Pires, MD Pediatrics   Mirza Llamas, PA-C  Sondra Diez, MD Bianca Ybarra, MD Kimberly Bartlett APRN CNP   ADRIAN Pritchett CNP, MD Shawn Harrington, MD Marzena Reddy, CNP  Same-Day (No follow up visit)   Alexis Neville, FREDDIE Landon, PA-C    Hyun Johnson PAJanieC     Clinic hours: Monday - Thursday 7 am-6 pm; Fridays 7 am-5 pm.   Urgent care: Monday - Friday 10 am- 8 pm; Saturday and Sunday 9 am-5 pm.    Clinic: [phone]       [facility]: Monday - Thursday 8 am - 7 pm; Friday 8 am - 6 pm  [facility]: [phone]     Preventive Care Advice   This is general advice given by our system to help you stay healthy. However, your care team may have specific advice just for you. Please talk to your care team about your preventive care needs.  Nutrition  Eat 5 or more servings of fruits and vegetables each day.  Try wheat bread, brown rice and whole grain pasta (instead of white bread, rice, and pasta).  Get enough calcium and vitamin D. Check the label on foods and aim for 100% of the RDA (recommended daily allowance).  Lifestyle  Exercise at least 150 minutes each week   (30 minutes a day, 5 days a week).  Do muscle strengthening activities 2 days a week. These help control  your weight and prevent disease.  No smoking.  Wear sunscreen to prevent skin cancer.  Have a dental exam and cleaning every 6 months.  Yearly exams  See your health care team every year to talk about:  Any changes in your health.  Any medicines your care team has prescribed.  Preventive care, family planning, and ways to prevent chronic diseases.  Shots (vaccines)   HPV shots (up to age 26), if you've never had them before.  Hepatitis B shots (up to age 59), if you've never had them before.  COVID-19 shot: Get this shot when it's due.  Flu shot: Get a flu shot every year.  Tetanus shot: Get a tetanus shot every 10 years.  Pneumococcal, hepatitis A, and RSV shots: Ask your care team if you need these based on your risk.  Shingles shot (for age 50 and up).  General health tests  Diabetes screening:  Starting at age 35, Get screened for diabetes at least every 3 years.  If you are younger than age 35, ask your care team if you should be screened for diabetes.  Cholesterol test: At age 39, start having a cholesterol test every 5 years, or more often if advised.  Bone density scan (DEXA): At age 50, ask your care team if you should have this scan for osteoporosis (brittle bones).  Hepatitis C: Get tested at least once in your life.  STIs (sexually transmitted infections)  Before age 24: Ask your care team if you should be screened for STIs.  After age 24: Get screened for STIs if you're at risk. You are at risk for STIs (including HIV) if:  You are sexually active with more than one person.  You don't use condoms every time.  You or a partner was diagnosed with a sexually transmitted infection.  If you are at risk for HIV, ask about PrEP medicine to prevent HIV.  Get tested for HIV at least once in your life, whether you are at risk for HIV or not.  Cancer screening tests  Cervical cancer screening: If you have a cervix, begin getting regular cervical cancer screening tests at age 21. Most people who have regular  screenings with normal results can stop after age 65. Talk about this with your provider.  Breast cancer scan (mammogram): If you've ever had breasts, begin having regular mammograms starting at age 40. This is a scan to check for breast cancer.  Colon cancer screening: It is important to start screening for colon cancer at age 45.  Have a colonoscopy test every 10 years (or more often if you're at risk) Or, ask your provider about stool tests like a FIT test every year or Cologuard test every 3 years.  To learn more about your testing options, visit: https://www.VeriShow/771472.pdf.  For help making a decision, visit: https://bit.Cloud Elements/hx96426.  Prostate cancer screening test: If you have a prostate and are age 55 to 69, ask your provider if you would benefit from a yearly prostate cancer screening test.  Lung cancer screening: If you are a current or former smoker age 50 to 80, ask your care team if ongoing lung cancer screenings are right for you.  For informational purposes only. Not to replace the advice of your health care provider. Copyright   2023 [employer]. All rights reserved. Clinically reviewed by the [facility] Transitions Program. Pando[employer] 480306 - REV 01/24.    Learning About Stress  What is stress?     Stress is your body's response to a hard situation. Your body can have a physical, emotional, or mental response. Stress is a fact of life for most people, and it affects everyone differently. What causes stress for you may not be stressful for someone else.  A lot of things can cause stress. You may feel stress when you go on a job interview, take a test, or run a race. This kind of short-term stress is normal and even useful. It can help you if you need to work hard or react quickly. For example, stress can help you finish an important job on time.  Long-term stress is caused by ongoing stressful situations or events. Examples of long-term stress include long-term health  problems, ongoing problems at work, or conflicts in your family. Long-term stress can harm your health.  How does stress affect your health?  When you are stressed, your body responds as though you are in danger. It makes hormones that speed up your heart, make you breathe faster, and give you a burst of energy. This is called the fight-or-flight stress response. If the stress is over quickly, your body goes back to normal and no harm is done.  But if stress happens too often or lasts too long, it can have bad effects. Long-term stress can make you more likely to get sick, and it can make symptoms of some diseases worse. If you tense up when you are stressed, you may develop neck, shoulder, or low back pain. Stress is linked to high blood pressure and heart disease.  Stress also harms your emotional health. It can make you flores, tense, or depressed. Your relationships may suffer, and you may not do well at work or school.  What can you do to manage stress?  You can try these things to help manage stress:   Do something active. Exercise or activity can help reduce stress. Walking is a great way to get started. Even everyday activities such as housecleaning or yard work can help.  Try yoga or max chi. These techniques combine exercise and meditation. You may need some training at first to learn them.  Do something you enjoy. For example, listen to music or go to a movie. Practice your hobby or do volunteer work.  Meditate. This can help you relax, because you are not worrying about what happened before or what may happen in the future.  Do guided imagery. Imagine yourself in any setting that helps you feel calm. You can use online videos, books, or a teacher to guide you.  Do breathing exercises. For example:  From a standing position, bend forward from the waist with your knees slightly bent. Let your arms dangle close to the floor.  Breathe in slowly and deeply as you return to a standing position. Roll up slowly  "and lift your head last.  Hold your breath for just a few seconds in the standing position.  Breathe out slowly and bend forward from the waist.  Let your feelings out. Talk, laugh, cry, and express anger when you need to. Talking with supportive friends or family, a counselor, or a carmen leader about your feelings is a healthy way to relieve stress. Avoid discussing your feelings with people who make you feel worse.  Write. It may help to write about things that are bothering you. This helps you find out how much stress you feel and what is causing it. When you know this, you can find better ways to cope.  What can you do to prevent stress?  You might try some of these things to help prevent stress:  Manage your time. This helps you find time to do the things you want and need to do.  Get enough sleep. Your body recovers from the stresses of the day while you are sleeping.  Get support. Your family, friends, and community can make a difference in how you experience stress.  Limit your news feed. Avoid or limit time on social media or news that may make you feel stressed.  Do something active. Exercise or activity can help reduce stress. Walking is a great way to get started.  Where can you learn more?  Go to https://www.AVTherapeutics.net/patiented  Enter N032 in the search box to learn more about \"Learning About Stress.\"  Current as of: February 26, 2023               Content Version: 13.8    4393-5809 [employer].   Care instructions adapted under license by your healthcare professional. If you have questions about a medical condition or this instruction, always ask your healthcare professional. [employer] disclaims any warranty or liability for your use of this information.      Learning About Alcohol Use Disorder  What is alcohol use disorder?  Alcohol use disorder means that a person drinks alcohol even though it causes harm to themselves or others. It can range from mild to severe. The " more symptoms of this disorder you have, the more severe it may be. People who have it may find it hard to control their use of alcohol.  People who have this disorder may argue with others about how much they're drinking. Their job may be affected because of drinking. They may drink when it's dangerous or illegal, such as when they drive. They also may have a strong need, or craving, to drink. They may feel like they must drink just to get by. Their drinking may increase their risk of getting hurt or being in a car crash.  Over time, drinking too much alcohol may cause health problems. These may include high blood pressure, liver problems, or problems with digestion.  What are the symptoms?  Maybe you've wondered about your alcohol habits or how to tell if your drinking is becoming a problem.  Here are some of the symptoms of alcohol use disorder. You may have it if you have two or more of the following symptoms:  You drink larger amounts of alcohol than you ever meant to. Or you've been drinking for a longer time than you ever meant to.  You can't cut down or control your use. Or you constantly wish you could cut down.  You spend a lot of time getting or drinking alcohol or recovering from its effects.  You have strong cravings for alcohol.  You can no longer do your main jobs at work, at school, or at home.  You keep drinking alcohol, even though your use hurts your relationships.  You have stopped doing important activities because of your alcohol use.  You drink alcohol in situations where doing so is dangerous.  You keep drinking alcohol even though you know it's causing health problems.  You need more and more alcohol to get the same effect, or you get less effect from the same amount over time. This is called tolerance.  You have uncomfortable symptoms when you stop drinking alcohol or use less. This is called withdrawal.  Alcohol use disorder can range from mild to severe. The more symptoms you have, the  "more severe the disorder may be.  You might not realize that your drinking is a problem. You might not drink large amounts when you drink. Or you might go for days or weeks between drinking episodes. But even if you don't drink very often, your drinking could still be harmful and put you at risk.  How is alcohol use disorder treated?  Getting help is up to you. But you don't have to do it alone. There are many people and kinds of treatments that can help.  Treatment for alcohol use disorder can include:  Group therapy, one or more types of counseling, and alcohol education.  Medicines that help to:  Reduce withdrawal symptoms and help you safely stop drinking.  Reduce cravings for alcohol.  Support groups. These groups include Alcoholics Anonymous and [employer] (Self-Management and Recovery Training).  Some people are able to stop or cut back on drinking with help from a counselor. People who have moderate to severe alcohol use disorder may need medical treatment. They may need to stay in a hospital or treatment center.  You may have a treatment team to help you. This team may include a psychologist or psychiatrist, counselors, doctors, social workers, nurses, and a . A  helps plan and manage your treatment.  Follow-up care is a key part of your treatment and safety. Be sure to make and go to all appointments, and call your doctor if you are having problems. It's also a good idea to know your test results and keep a list of the medicines you take.  Where can you learn more?  Go to https://www.Netasq.net/patiented  Enter H758 in the search box to learn more about \"Learning About Alcohol Use Disorder.\"  Current as of: March 21, 2023               Content Version: 13.8    4405-5920 [employer].   Care instructions adapted under license by your healthcare professional. If you have questions about a medical condition or this instruction, always ask your healthcare " professional. [employer], [facility] disclaims any warranty or liability for your use of this information.      Substance Use Disorder: Care Instructions  Overview     You can improve your life and health by stopping your use of alcohol or drugs. When you don't drink or use drugs, you may feel and sleep better. You may get along better with your family, friends, and coworkers. There are medicines and programs that can help with substance use disorder.  How can you care for yourself at home?  Here are some ways to help you stay sober and prevent relapse.  If you have been given medicine to help keep you sober or reduce your cravings, be sure to take it exactly as prescribed.  Talk to your doctor about programs that can help you stop using drugs or drinking alcohol.  Do not keep alcohol or drugs in your home.  Plan ahead. Think about what you'll say if other people ask you to drink or use drugs. Try not to spend time with people who drink or use drugs.  Use the time and money spent on drinking or drugs to do something that's important to you.  Preventing a relapse  Have a plan to deal with relapse. Learn to recognize changes in your thinking that lead you to drink or use drugs. Get help before you start to drink or use drugs again.  Try to stay away from situations, friends, or places that may lead you to drink or use drugs.  If you feel the need to drink alcohol or use drugs again, seek help right away. Call a trusted friend or family member. Some people get support from organizations such as Narcotics Anonymous or [employer] or from treatment facilities.  If you relapse, get help as soon as you can. Some people make a plan with another person that outlines what they want that person to do for them if they relapse. The plan usually includes how to handle the relapse and who to notify in case of relapse.  Don't give up. Remember that a relapse doesn't mean that you have failed. Use the experience to learn the  "triggers that lead you to drink or use drugs. Then quit again. Recovery is a lifelong process. Many people have several relapses before they are able to quit for good.  Follow-up care is a key part of your treatment and safety. Be sure to make and go to all appointments, and call your doctor if you are having problems. It's also a good idea to know your test results and keep a list of the medicines you take.  When should you call for help?   Call 911  anytime you think you may need emergency care. For example, call if you or someone else:    Has overdosed or has withdrawal signs. Be sure to tell the emergency workers that you are or someone else is using or trying to quit using drugs. Overdose or withdrawal signs may include:  Losing consciousness.  Seizure.  Seeing or hearing things that aren't there (hallucinations).     Is thinking or talking about suicide or harming others.   Where to get help 24 hours a day, 7 days a week   If you or someone you know talks about suicide, self-harm, a mental health crisis, a substance use crisis, or any other kind of emotional distress, get help right away. You can:    Call the Suicide and Crisis Lifeline at 988.     Call 3-209-753-TALK (1-739.461.4951).     Text HOME to 407629 to access the Crisis Text Line.   Consider saving these numbers in your phone.  Go to [employer] for more information or to chat online.  Call your doctor now or seek immediate medical care if:    You are having withdrawal symptoms. These may include nausea or vomiting, sweating, shakiness, and anxiety.   Watch closely for changes in your health, and be sure to contact your doctor if:    You have a relapse.     You need more help or support to stop.   Where can you learn more?  Go to https://www.healthwise.net/patiented  Enter H573 in the search box to learn more about \"Substance Use Disorder: Care Instructions.\"  Current as of: March 21, 2023               Content Version: 13.8    9868-8641 " [employer].   Care instructions adapted under license by your healthcare professional. If you have questions about a medical condition or this instruction, always ask your healthcare professional. Healthwise, [employer] disclaims any warranty or liability for your use of this information.

## 2024-03-06 NOTE — NURSING NOTE
Prior to immunization administration, verified patients identity using patient s name and date of birth. Please see Immunization Activity for additional information.     Screening Questionnaire for Adult Immunization    Are you sick today?   No   Do you have allergies to medications, food, a vaccine component or latex?   No   Have you ever had a serious reaction after receiving a vaccination?   No   Do you have a long-term health problem with heart, lung, kidney, or metabolic disease (e.g., diabetes), asthma, a blood disorder, no spleen, complement component deficiency, a cochlear implant, or a spinal fluid leak?  Are you on long-term aspirin therapy?   No   Do you have cancer, leukemia, HIV/AIDS, or any other immune system problem?   No   Do you have a parent, brother, or sister with an immune system problem?   No   In the past 3 months, have you taken medications that affect  your immune system, such as prednisone, other steroids, or anticancer drugs; drugs for the treatment of rheumatoid arthritis, Crohn s disease, or psoriasis; or have you had radiation treatments?   No   Have you had a seizure, or a brain or other nervous system problem?   No   During the past year, have you received a transfusion of blood or blood    products, or been given immune (gamma) globulin or antiviral drug?   No   For women: Are you pregnant or is there a chance you could become       pregnant during the next month?   No   Have you received any vaccinations in the past 4 weeks?   No     Immunization questionnaire answers were all negative.      Patient instructed to remain in clinic for 15 minutes afterwards, and to report any adverse reactions.     Screening performed by Eve Payan MA on 3/6/2024 at 4:33 PM.

## 2024-03-06 NOTE — PROGRESS NOTES
Preventive Care Visit  [facility]  Ghulam Stockton MD, Family Medicine  Mar 6, 2024    Assessment & Plan     (Z00.00) Encounter for Medicare annual wellness exam  (primary encounter diagnosis)  Comment: Negative screening exam; up-to-date on preventive services.   Plan: HIV Antigen Antibody Combo, Hepatitis C Screen         Reflex to HCV RNA Quant and Genotype,         Pneumococcal 20 Valent Conjugate (PCV20),         COVID-19 12+ (2023-24) (PFIZER), Lipid panel         reflex to direct LDL Non-fasting, PSA, screen        Return in about 1 year (around 3/6/2025) for Medicare annual wellness exam.      (G20.A1) Parkinson's disease without dyskinesia or fluctuating manifestations  Comment: He continues to shows signs of parkinsonism, but they are mild, responding well to medication.   Plan: His next Neurology appointment is 8/26/24.     (R03.0) Elevated blood pressure reading without diagnosis of hypertension  Comment: he has normal blood pressures at home.   Plan: Basic metabolic panel, Lipid panel reflex to         direct LDL Non-fasting        follow up if home readings are persistently high or he has abnormal renal function.     (K40.20) Non-recurrent bilateral inguinal hernia without obstruction or gangrene  Comment: Asymptomatic other than their presence. It has been years since he discussed surgery with a surgeon, which was recommended back then.   Plan: Adult General Surg Referral            (Z11.59) Need for hepatitis C screening test  Comment: indications for screening discussed with the patient   Plan: Hepatitis C Screen Reflex to HCV RNA Quant and         Genotype            (Z11.4) Screening for HIV (human immunodeficiency virus)  Comment: indications for screening discussed with the patient   Plan: HIV Antigen Antibody Combo            (Z12.5) Prostate cancer screening  Comment: Requested.   Plan: PSA, screen            (Z23) Need for vaccination  Comment:   Plan:  Pneumococcal 20 Valent Conjugate (PCV20),         COVID-19 12+ (2023-24) (PFIZER)            (Z28.21) Vaccination not carried out because of patient refusal  Comment: Influenza  Plan: VIS provided.     (Z29.11) Need for vaccination against respiratory syncytial virus  (Z23) Need for shingles vaccine  Comment: pharmacy  Plan: VISs provided.              BMI  Estimated body mass index is 27.94 kg/m  as calculated from the following:    Height as of this encounter: 1.829 m (6').    Weight as of this encounter: 93.4 kg (206 lb).       Counseling  Appropriate preventive services were discussed with this patient, including applicable screening as appropriate for fall prevention, nutrition, physical activity, Tobacco-use cessation, weight loss and cognition.  Checklist reviewing preventive services available has been given to the patient.  Reviewed patient's diet, addressing concerns and/or questions.   The patient was instructed to see the dentist every 6 months.   He is at risk for psychosocial distress and has been provided with information to reduce risk.   The patient reports drinking more than one alcoholic drink per day and sometimes engages in binge or excessive drinking. The patient was counseled and given information about possible harmful effects of excessive alcohol intake as well as where to get help for alcohol problems.         Julissa Velazquez is a 63 year old, presenting for the following:  Physical        3/6/2024     3:31 PM   Additional Questions   Roomed by Bruce   Accompanied by Self        Health Care Directive  Patient does not have a Health Care Directive or Living Will: Discussed advance care planning with patient; information given to patient to review.    HPI          3/5/2024   General Health   How would you rate your overall physical health? Excellent   Feel stress (tense, anxious, or unable to sleep) Only a little   (!) STRESS CONCERN      3/5/2024   Nutrition   Diet: Regular (no  restrictions)         3/5/2024   Exercise   Days per week of moderate/strenous exercise 5 days   Average minutes spent exercising at this level 60 min         3/5/2024   Social Factors   Frequency of gathering with friends or relatives Once a week   Worry food won't last until get money to buy more No   Food not last or not have enough money for food? No   Do you have housing?  Yes   Are you worried about losing your housing? No   Lack of transportation? No   Unable to get utilities (heat,electricity)? No         3/6/2024   Fall Risk   Gait Speed Test (Document in seconds) 2.93   Gait Speed Test Interpretation Less than or equal to 5.00 seconds - PASS          3/5/2024   Activities of Daily Living- Home Safety   Needs help with the following daily activites None of the above   Safety concerns in the home None of the above         3/5/2024   Dental   Dentist two times every year? (!) NO         3/5/2024   Hearing Screening   Hearing concerns? None of the above         3/5/2024   Driving Risk Screening   Patient/family members have concerns about driving No         3/5/2024   General Alertness/Fatigue Screening   Have you been more tired than usual lately? No         3/5/2024   Urinary Incontinence Screening   Bothered by leaking urine in past 6 months No         3/5/2024   TB Screening   Were you born outside of [place]?  No         Today's PHQ-2 Score:       3/5/2024     7:02 PM   PHQ-2 ( 1999 Pf[employer])   Q1: Little interest or pleasure in doing things 0   Q2: Feeling down, depressed or hopeless 0   PHQ-2 Score 0   Q1: Little interest or pleasure in doing things Not at all   Q2: Feeling down, depressed or hopeless Not at all   PHQ-2 Score 0           3/5/2024   Substance Use   Alcohol more than 3/day or more than 7/wk Yes   How often do you have a drink containing alcohol 4 or more times a week   How many alcohol drinks on typical day 1 or 2   How often do you have 5+ drinks at one occasion Monthly   Audit 2/3 Score 2  "  How often not able to stop drinking once started Never   How often failed to do what normally expected Never   How often needed first drink in am after a heavy drinking session Never   How often feeling of guilt or remorse after drinking Less than monthly   How often unable to remember what happened the night before Never   Have you or someone else been injured because of your drinking No   Has anyone been concerned or suggested you cut down on drinking Yes, but not in the last year   TOTAL SCORE - AUDIT 9   Do you have a current opioid prescription? No   How severe/bad is pain from 1 to 10? 0/10 (No Pain)   Do you use any other substances recreationally? (!) ALCOHOL     Social History     Tobacco Use    Smoking status: Some Days     Types: Cigars    Smokeless tobacco: Never    Tobacco comments:     Cigars, occasional   Vaping Use    Vaping Use: Never used   Substance Use Topics    Alcohol use: Yes     Alcohol/week: 7.0 - 8.0 standard drinks of alcohol     Types: 7 - 8 Standard drinks or equivalent per week    Drug use: No             3/5/2024   One time HIV Screening   Previous HIV test? No   Last PSA: No results found for: \"PSA\"  ASCVD Risk   The ASCVD Risk score (Jaimie FRAIRE, et al., 2019) failed to calculate for the following reasons:    Cannot find a previous HDL lab    Cannot find a previous total cholesterol lab    Past medical, family, and social histories, medications, and allergies are reviewed and updated in Epic.           Current providers sharing in care for this patient include:  Patient Care Team:  Clinic - [facility] as PCP - General  Murphy Jalloh MD as MD (Family Practice)  Aidee Buck MD as MD (Neurology)  Suzan Lua RP as Pharmacist (Pharmacist)  Suzan Lua RP as Assigned [facility] Pharmacist  Kelley Deluca, RN as Lead Care Coordinator  Sreedhar Kathleen MD as Assigned Neuroscience Provider    The following health " maintenance items are reviewed in Epic and correct as of today:  Health Maintenance   Topic Date Due    ANNUAL REVIEW OF HM ORDERS  Never done    ADVANCE CARE PLANNING  Never done    HIV SCREENING  Never done    HEPATITIS C SCREENING  Never done    LIPID  Never done    ZOSTER IMMUNIZATION (1 of 2) Never done    LUNG CANCER SCREENING  Never done    RSV VACCINE (Pregnancy & 60+) (1 - 1-dose 60+ series) Never done    GLUCOSE  06/19/2022    Pneumococcal Vaccine: 65+ Years (1 of 1 - PCV) Never done    AORTIC ANEURYSM SCREENING (SYSTEM ASSIGNED)  Never done    INFLUENZA VACCINE (1) Never done    COVID-19 Vaccine (4 - 2023-24 season) 09/01/2023    MEDICARE ANNUAL WELLNESS VISIT  03/06/2025    FALL RISK ASSESSMENT  03/06/2025    DTAP/TDAP/TD IMMUNIZATION (2 - Td or Tdap) 03/09/2026    COLORECTAL CANCER SCREENING  04/13/2030    PHQ-2 (once per calendar year)  Completed    IPV IMMUNIZATION  Aged Out    HPV IMMUNIZATION  Aged Out    MENINGITIS IMMUNIZATION  Aged Out    RSV MONOCLONAL ANTIBODY  Aged Out       Review of Systems        Objective    Exam  BP (!) 156/80 (BP Location: Left arm, Patient Position: Chair, Cuff Size: Adult Regular)   Pulse 79   Temp 98  F (36.7  C) (Tympanic)   Resp 18   Ht 1.829 m (6')   Wt 93.4 kg (206 lb)   SpO2 100%   BMI 27.94 kg/m     Estimated body mass index is 27.94 kg/m  as calculated from the following:    Height as of this encounter: 1.829 m (6').    Weight as of this encounter: 93.4 kg (206 lb).    Physical Exam  GENERAL: alert and no distress  EYES: Eyes grossly normal to inspection, PERRL and conjunctivae and sclerae normal  HENT: ear canals and TM's normal, nose and mouth without ulcers or lesions  NECK: no adenopathy, no asymmetry, masses, or scars  RESP: lungs clear to auscultation - no rales, rhonchi or wheezes  CV: regular rate and rhythm, normal S1 S2, no S3 or S4, no murmur, click or rub, no peripheral edema  ABDOMEN: soft, nontender, no hepatosplenomegaly, no masses and  bowel sounds normal   (male): normal male genitalia without lesions or urethral discharge. He has bilateral inguinal hernias. The right one is more prominent.  MS: no gross musculoskeletal defects noted, no edema  SKIN: no suspicious lesions or rashes  NEURO: Normal strength and increased tone, mentation intact and speech normal. Reflexes normal and symmetric in the upper and lower extremities. Slight vocal and hand tremor noted.   PSYCH: mentation appears normal, affect normal/bright        3/6/2024   Mini Cog   Clock Draw Score 2 Normal   3 Item Recall 2 objects recalled   Mini Cog Total Score 4     Vision Screen  Vision Screen Results: Pass        Signed Electronically by: Ghulam Stockton MD      The information in this document, created by the medical scribe for me, accurately reflects the services I personally performed and the decisions made by me. I have reviewed and approved this document for accuracy prior to signature.  Ant Lazaro

## 2024-03-07 LAB
ANION GAP SERPL CALCULATED.3IONS-SCNC: 9 MMOL/L (ref 7–15)
BUN SERPL-MCNC: 17.6 MG/DL (ref 8–23)
CALCIUM SERPL-MCNC: 9.4 MG/DL (ref 8.8–10.2)
CHLORIDE SERPL-SCNC: 104 MMOL/L (ref 98–107)
CHOLEST SERPL-MCNC: 213 MG/DL
CREAT SERPL-MCNC: 1.14 MG/DL (ref 0.67–1.17)
DEPRECATED HCO3 PLAS-SCNC: 28 MMOL/L (ref 22–29)
EGFRCR SERPLBLD CKD-EPI 2021: 71 ML/MIN/1.73M2
FASTING STATUS PATIENT QL REPORTED: NO
GLUCOSE SERPL-MCNC: 106 MG/DL (ref 70–99)
HCV AB SERPL QL IA: NONREACTIVE
HDLC SERPL-MCNC: 57 MG/DL
HIV 1+2 AB+HIV1 P24 AG SERPL QL IA: NONREACTIVE
LDLC SERPL CALC-MCNC: 111 MG/DL
NONHDLC SERPL-MCNC: 156 MG/DL
POTASSIUM SERPL-SCNC: 4.8 MMOL/L (ref 3.4–5.3)
PSA SERPL DL<=0.01 NG/ML-MCNC: 4.66 NG/ML (ref 0–4.5)
SODIUM SERPL-SCNC: 141 MMOL/L (ref 135–145)
TRIGL SERPL-MCNC: 226 MG/DL

## 2024-03-25 ENCOUNTER — PATIENT OUTREACH (OUTPATIENT)
Dept: GERIATRIC MEDICINE | Facility: CLINIC | Age: 64
End: 2024-03-25
Payer: COMMERCIAL

## 2024-03-25 NOTE — PROGRESS NOTES
[facility] Care Coordination Contact    CHW called Mbr to remind MA renewal paperwork because the renewal period due date (04/01/24) and LVM.  CHW provided contact information.     MILAD Negron  [facility]  [phone]

## 2024-04-22 ENCOUNTER — MYC MEDICAL ADVICE (OUTPATIENT)
Dept: NEUROLOGY | Facility: CLINIC | Age: 64
End: 2024-04-22
Payer: COMMERCIAL

## 2024-04-23 DIAGNOSIS — G47.09 OTHER INSOMNIA: Primary | ICD-10-CM

## 2024-04-23 RX ORDER — DOXEPIN HYDROCHLORIDE 10 MG/1
10 CAPSULE ORAL AT BEDTIME
Qty: 30 CAPSULE | Refills: 11 | Status: SHIPPED | OUTPATIENT
Start: 2024-04-23 | End: 2024-07-27

## 2024-04-29 ENCOUNTER — NURSE TRIAGE (OUTPATIENT)
Dept: FAMILY MEDICINE | Facility: CLINIC | Age: 64
End: 2024-04-29
Payer: COMMERCIAL

## 2024-04-29 NOTE — TELEPHONE ENCOUNTER
Patient calling with 3 weeks for elevated heart rates and sleep issues. Pt states his HR now is about 110. Pt also is reporting mild dizziness now. Denies any chest pain, SOB/difficulty breathing, and palpitations. Pt thinks symptoms are due to lack of sleep. Pt explains that symptoms have made his parkinson worse.   Pt repots he has had this happen before was prescribed metoprolol and will like to get started on that again. RN advise that he needed to be seen in the ER due to dizziness. Declined ER as he thinks it is due to standing up too fast. Pt wants to go to urgent care. RN explained to pt recommendation is ER, but can go to UC as well. RN reviewed differences between UC & ER. Pt states he will go to ER.    RN reviewed reasons for call back and 911 precautions. Pt verbalized understanding and agrees with plan.     Amanda Dowd RN    [facility]          Reason for Disposition   Dizziness, lightheadedness, or weakness    Additional Information   Negative: Passed out (i.e., lost consciousness, collapsed and was not responding)   Negative: Shock suspected (e.g., cold/pale/clammy skin, too weak to stand, low BP, rapid pulse)   Negative: Difficult to awaken or acting confused (e.g., disoriented, slurred speech)   Negative: Visible sweat on face or sweat dripping down face   Negative: Unable to walk, or can only walk with assistance (e.g., requires support)   Negative: Received SHOCK from implantable cardiac defibrillator and has persisting symptoms (i.e., palpitations, lightheadedness)   Negative: Dizziness, lightheadedness, or weakness and heart beating very rapidly (e.g., > 140 / minute)   Negative: Dizziness, lightheadedness, or weakness and heart beating very slowly (e.g., < 50 / minute)   Negative: Sounds like a life-threatening emergency to the triager   Negative: Chest pain   Negative: Heart beating very rapidly (e.g., > 140 / minute) and present now  (Exception: During exercise.)    Answer Assessment -  "Initial Assessment Questions  1. DESCRIPTION: \"Please describe your heart rate or heartbeat that you are having\" (e.g., fast/slow, regular/irregular, skipped or extra beats, \"palpitations\")      fast  2. ONSET: \"When did it start?\" (Minutes, hours or days)       2-3 weeks ago  3. DURATION: \"How long does it last\" (e.g., seconds, minutes, hours)      30-45 minutes   4. PATTERN \"Does it come and go, or has it been constant since it started?\"  \"Does it get worse with exertion?\"   \"Are you feeling it now?\"      Sleep and 30 minutes after getting up   5. TAP: \"Using your hand, can you tap out what you are feeling on a chair or table in front of you, so that I can hear?\" (Note: not all patients can do this)        *No Answer*  6. HEART RATE: \"Can you tell me your heart rate?\" \"How many beats in 15 seconds?\"  (Note: not all patients can do this)        110 6 seconds x 10  7. RECURRENT SYMPTOM: \"Have you ever had this before?\" If Yes, ask: \"When was the last time?\" and \"What happened that time?\"       Yes, pt was put on beta blocker  8. CAUSE: \"What do you think is causing the palpitations?\"      Poor sleep  9. CARDIAC HISTORY: \"Do you have any history of heart disease?\" (e.g., heart attack, angina, bypass surgery, angioplasty, arrhythmia)       denies  10. OTHER SYMPTOMS: \"Do you have any other symptoms?\" (e.g., dizziness, chest pain, sweating, difficulty breathing)        Mild dizziness when standing up,   Denies sweating, chest pain, difficulty breathing.     Pt gets more shaky and parkinson exacerbates    Protocols used: Heart Rate and Heartbeat [id number]    "

## 2024-05-08 ENCOUNTER — PATIENT OUTREACH (OUTPATIENT)
Dept: GERIATRIC MEDICINE | Facility: CLINIC | Age: 64
End: 2024-05-08
Payer: COMMERCIAL

## 2024-05-08 NOTE — Clinical Note
FYI per health plan.   Kelley Deluca RN [facility] [phone]

## 2024-05-08 NOTE — PROGRESS NOTES
[facility] Care Coordination Contact  CC received notification of Emergency Room visit.  ER visit occurred on 4/29/24 at [facility]  with Dx of Palpitations, HTN.    CC contacted member and reviewed discharge summary.  Member has a follow-up appointment with PCP: No: Offered Assistance with setting up a follow up appointment  Member has had a change in condition: No  New referrals placed: No  Home Visit Needed: No  Care plan reviewed and updated.  PCP notified of ED visit via EMR.    Kelley Deluca RN  [facility]  [phone]

## 2024-05-27 NOTE — PATIENT INSTRUCTIONS
1.  Continue current medication  Carbidopa/levodopa 25/100 one tablet, at least one half hour before meals  2.  Try Melatonin 5 mg at bedtime  3.  Letter written for DOT certifcation  4.  6 month return  
never

## 2024-06-06 ENCOUNTER — PATIENT OUTREACH (OUTPATIENT)
Dept: GERIATRIC MEDICINE | Facility: CLINIC | Age: 64
End: 2024-06-06
Payer: COMMERCIAL

## 2024-06-07 NOTE — PROGRESS NOTES
[facility] Care Coordination Contact    No longer active with [facility] community case management effective 3/31/2024.  Reason for community disenrollment: Change Insurance to [employer].    Kelley Deluca RN  [facility]  [phone]

## 2024-07-26 ENCOUNTER — VIRTUAL VISIT (OUTPATIENT)
Dept: NEUROLOGY | Facility: CLINIC | Age: 64
End: 2024-07-26
Payer: COMMERCIAL

## 2024-07-26 DIAGNOSIS — G40.909 SEIZURE DISORDER (H): ICD-10-CM

## 2024-07-26 PROCEDURE — G2211 COMPLEX E/M VISIT ADD ON: HCPCS | Performed by: PSYCHIATRY & NEUROLOGY

## 2024-07-26 PROCEDURE — 99213 OFFICE O/P EST LOW 20 MIN: CPT | Mod: 95 | Performed by: PSYCHIATRY & NEUROLOGY

## 2024-07-26 RX ORDER — LEVETIRACETAM 500 MG/1
500 TABLET ORAL 2 TIMES DAILY
Qty: 180 TABLET | Refills: 3 | Status: SHIPPED | OUTPATIENT
Start: 2024-07-26 | End: 2024-08-26

## 2024-07-26 NOTE — LETTER
2024      Marcus Daley  [address] [place] [address]      Dear Colleague,    Thank you for referring your patient, Marcus Daley, to the [facility] NEUROLOG[facility] [place]. Please see a copy of my visit note below.    Marcus is a 64 year old who is being evaluated via a billable video visit.    How would you like to obtain your AVS? MyChart  If the video visit is dropped, the invitation should be resent by: Text to cell phone: [phone]  Will anyone else be joining your video visit? No  {If patient encounters technical issues they should call [phone] :[id number]}  Video-Visit Details    Type of service:  10:51  Video End Time:11:09  Originating Location (pt. Location): Home  {PROVIDER LOCATION On-site should be selected for visits conducted from your clinic location or adjoining [facility] hospital, academic office, or other nearby [facility] building. Off-site should be selected for all other provider locations, including home:[id number]}  Distant Location (provider location):  On-site  Platform used for Video Visit: [facility]      NEUROLOGY PROGRESS NOTE   [facility]    Patient:Marcus Daley  : 1960  Age: 64 year old  Today's Virtual Visit: 2024    History of present illness:     Mr. Marcus Daley Is participating in this virtual visit for follow-up on his epilepsy.   He was last seen 2024.  He has had 2 seizures in his life, He reports no seizures since 2019.  He is taking levetiracetam 500-1000. This was decreased in the previous visit from 1000 mg bid .  Denies side effects.     Last Keppra level 24: 24.8    His parkinson's symptoms are mild. He reports no fall or freezing, It's hard to play guitar.      Current Outpatient Medications   Medication Sig Dispense Refill     carbidopa-levodopa (SINEMET CR)  MG CR tablet Take 1 tablet by mouth at bedtime 92 tablet 3     carbidopa-levodopa (SINEMET)  MG tablet Take 1.5 tablets by mouth 3 times  daily 420 tablet 3     levETIRAcetam (KEPPRA) 500 MG tablet TAKE 1 TABLET IN THE MORNING AND 2 TABS IN THE EVENING 270 tablet 3     polyethylene glycol (MIRALAX) 17 g packet Take 17 g by mouth daily as needed for constipation       senna (SENOKOT) 8.6 MG tablet Take 1 tablet by mouth daily as needed for constipation       doxepin (SINEQUAN) 10 MG capsule Take 1 capsule (10 mg) by mouth at bedtime 30 capsule 11       Exam:    There were no vitals taken for this visit.     Wt Readings from Last 5 Encounters:   03/06/24 93.4 kg (206 lb)   01/19/24 90.7 kg (200 lb)   08/21/23 93.4 kg (206 lb)   02/01/23 93.4 kg (206 lb)   05/09/22 95.3 kg (210 lb)     Alert, awake, NAD, talks slowly, no aphasia or dysarthria, EOMI, smiles symmetrically, good facial expressions, moves upper extremities against gravity, no tremors were noted in a stretched arms position and finger-to-nose.    Assessment/Plan:    1. Epilepsy, nocturnal seizures x2: Seizures are controlled on levetiracetam monotherapy.  Patient denies side effects.  Levetiracetam level was decreased by 500 mg in the previous visit.  There was no seizure recurrence.  Last seizure was 11/1/2019.  The patient inquires about coming off medication.  His EEG was normal, his MRI shows white matter ischemic changes.  I discussed potential risk of recurrence of seizures.  His seizures are nocturnal and there is a possibility of seizures going unnoticed.  He agrees to continue on the medication, considering he has no side effects.  I suggested to decrease levetiracetam by 500 mg daily.     -Decrease levetiracetam to 500 mg twice a day.  -Obtain levetiracetam level in 1 week for efficacy.    2.  Mild Parkinson's disease: Patient's tremors are well-controlled.  He denies difficulty walking, imbalance, falls.  He follows with Dr. Sreedhar Kathleen.      - Continue taking levodopa-carbidopa 25/100, 3 times a day  - Continue carbidopa/levodopa, 50/200, ER, 1 tablet at bedtime     - Return to  clinic in 6 months.        As described above, I met with the patient for 17 minutes and during this time counseling was greater than 50% of the visit time.  Aidee Buck MD      Again, thank you for allowing me to participate in the care of your patient.        Sincerely,        Aidee Buck MD

## 2024-07-26 NOTE — PROGRESS NOTES
Marcus is a 64 year old who is being evaluated via a billable video visit.    How would you like to obtain your AVS? MyChart  If the video visit is dropped, the invitation should be resent by: Text to cell phone: [phone]  Will anyone else be joining your video visit? No    Video-Visit Details    Type of service:  10:51  Video End Time:11:09  Originating Location (pt. Location): Home    Distant Location (provider location):  On-site  Platform used for Video Visit: [facility]      NEUROLOGY PROGRESS NOTE   [facility]    Patient:Marcus Daley  : 1960  Age: 64 year old  Today's Virtual Visit: 2024    History of present illness:     Mr. Marcus Daley Is participating in this virtual visit for follow-up on his epilepsy.   He was last seen 2024.  He has had 2 seizures in his life, He reports no seizures since 2019.  He is taking levetiracetam 500-1000. This was decreased in the previous visit from 1000 mg bid .  Denies side effects.     Last Keppra level 24: 24.8    His parkinson's symptoms are mild. He reports no fall or freezing, It's hard to play guitar.      Current Outpatient Medications   Medication Sig Dispense Refill    carbidopa-levodopa (SINEMET CR)  MG CR tablet Take 1 tablet by mouth at bedtime 92 tablet 3    carbidopa-levodopa (SINEMET)  MG tablet Take 1.5 tablets by mouth 3 times daily 420 tablet 3    levETIRAcetam (KEPPRA) 500 MG tablet TAKE 1 TABLET IN THE MORNING AND 2 TABS IN THE EVENING 270 tablet 3    polyethylene glycol (MIRALAX) 17 g packet Take 17 g by mouth daily as needed for constipation      senna (SENOKOT) 8.6 MG tablet Take 1 tablet by mouth daily as needed for constipation      doxepin (SINEQUAN) 10 MG capsule Take 1 capsule (10 mg) by mouth at bedtime 30 capsule 11       Exam:    There were no vitals taken for this visit.     Wt Readings from Last 5 Encounters:   24 93.4 kg (206 lb)   24 90.7 kg (200 lb)   23 93.4 kg (206 lb)    02/01/23 93.4 kg (206 lb)   05/09/22 95.3 kg (210 lb)     Alert, awake, NAD, talks slowly, no aphasia or dysarthria, EOMI, smiles symmetrically, good facial expressions, moves upper extremities against gravity, no tremors were noted in a stretched arms position and finger-to-nose.    Assessment/Plan:    1. Epilepsy, nocturnal seizures x2: Seizures are controlled on levetiracetam monotherapy.  Patient denies side effects.  Levetiracetam level was decreased by 500 mg in the previous visit.  There was no seizure recurrence.  Last seizure was 11/1/2019.  The patient inquires about coming off medication.  His EEG was normal, his MRI shows white matter ischemic changes.  I discussed potential risk of recurrence of seizures.  His seizures are nocturnal and there is a possibility of seizures going unnoticed.  He agrees to continue on the medication, considering he has no side effects.  I suggested to decrease levetiracetam by 500 mg daily.     -Decrease levetiracetam to 500 mg twice a day.  -Obtain levetiracetam level in 1 week for efficacy.    2.  Mild Parkinson's disease: Patient's tremors are well-controlled.  He denies difficulty walking, imbalance, falls.  He follows with Dr. Sreedhar Kathleen.      - Continue taking levodopa-carbidopa 25/100, 3 times a day  - Continue carbidopa/levodopa, 50/200, ER, 1 tablet at bedtime     - Return to clinic in 6 months.        As described above, I met with the patient for 17 minutes and during this time counseling was greater than 50% of the visit time.  Aidee Buck MD

## 2024-07-27 NOTE — PROGRESS NOTES
VIDEO VISIT    Date of Visit: Aug 26, 2024   Name: Marcus Daley  Date of Birth 1960  MRN: [id number]  [address] [place] [id number] [phone] (M)   [phone] (H)   [phone] (W)      [email or web address]  Ronel Daley  [phone]     [email or web address]     Granted proxy access to his wife and daughter Neva     Assessment:  (G20) Paralysis agitans (H)  (primary encounter diagnosis)     FROM ANA 12/2021  We saw the patient in 2018 for mild hand tremor.  He also had a history of REM behavior disorder.  We started him on carbidopa levodopa and he has had an excellent response.  He really has had no change over the past 3 years.  He continues to have an excellent response to low-dose carbidopa levodopa 25 101 tablet 3 times a day.  He has virtually no tremor and no signs of progression.  He is happy with his Parkinson's response.  He has had a first cousin who has developed Parkinson's disease but no first-degree relatives.     VOICE CHANGES 2010 dysphonia  RIGHT ARM CHANGES NOTICED 2017 January 2018 CHANGES IN HANDWRITING  6/2018 DIAGNOSIS PARKINSON WITH DR PEREZ - HAD SEEN GENESIS PREVIOUSLY 5/2018     Review of diagnosis    Parkinson's disease      Avoidance of dopamine blockers   Not taking     Motor complication review   No clear wearing off and doing well.      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 years - [employer]  Tries to do daily stretches and aerobics  Has not done big and loud therapy  Keeping active - tries to go in 2/week  And does aerobics and stationary bike     Cognitive/Driving   Driving   DOT physical October 2021 and will need another evaluation October 2022 and will have this done at [facility]      Mood   Will start golfing  again  Mood down this winter  Sun room  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 instruction  No travel plans     Hallucinations/delusions   No shadows     Sleep   Goes to sleep 1130pm  Pretty quickly falls asleep  Snoring  Not clearly talking  Had one rare episode of physical movement  He punched through a pillow one occasion - RBD  Consider sleep consultation  May wake up to go the bathroom at 3am and usually can go back to sleep   Wakes up for the day at 8 or 830am   He is not taking melatonin 3-5mg and titrated upwards for rem sleep disorder     Bladder/Renal/Prostate/Gyn/Other   age related issues  Nocturia 1/noc  No clear urgency or frequency  Some dribbling     GI/Constipation/GERD   Constipation - occasional laxative  Bowel movements are daily or other day  discussion about management of constipation     ENDO/Lipid/DM/Bone density/Thyroid  denies     Cardio/heart/Hyper or Hypotensive   143/81 -   Denies blood pressure problems     Vision/Dry Eyes/Cataracts/Glaucoma/Macular   Denies eye problems     Heme/Anticoagulation/Antiplatelet/Anemia/Other  Not taking an aspirin     ENT/Resp  ANOSMIA/hyposmia  Dysphonia  Muscle tension dysphonia      Skin/Cancer/Seborrhea/other  Denies cancer   Skin screening examination for cancer at some point.      Musculoskeletal/Pain/Headache  Upper spine - stiff neck - manageable with heat or ice     Other:  SEIZURE free since November 2019     Seizure disorder.  The patient had 2 unprovoked seizures at nighttime.  He would scream and thrash and this was observed by his wife.  We started him on levetiracetam and escalated the dose to 1000 mg twice daily.  The last seizure was on 11/1/2019.  He has had no seizures since.  We permitted driving after 6 months of medical compliance and seizure-free interval.  He now wonders about getting a commercial license.  He is looked into it and if we write that he has a seizure disorder and not use the term epilepsy this may qualify him as he is 2 years  seizure-free.     NORMAL HEAD CT SCAN 6/19/2019  1.  Normal exam.       BRAIN MRI 6/26/2019  1. No acute intracranial pathology.  2. Mild patchy white matter changes in the katarina which are similar to the prior study and may represent small vessel ischemic disease..      CERVICAL SPINE MRI 5/17/2018  1. Multilevel cervical spondylosis and degenerative spondylolisthesis. Moderate to advanced neural foraminal stenosis on the left at C6-7.  Moderate neural foraminal stenosis on the left at C3-4 and on the right at C4-5.  2. No significant spinal canal stenosis.  3. No cord signal abnormality.  4. Bilateral atlantooccipital assimilation.     eeg 6/27/2019  This is a normal waking and sleep EEG.     Return to see Dr. Buck and review the carlos Buck visit on 11/4/2022  Continuing on keppra and his level was 24.7 (10-40) on 11/15/2022  Taking levetiracetam keppra 500mg 2 tabs twice daily      Discussed medicare part D and medication coverage.      Discussed the medication schedule      Slight slowness on walking and asymmetric reduced arm swing.        11/3/2023 1045am check in and visit at 11am with Dr. Buck     Reviewed sinemet and discussed increasing it to 1.5 tabs 3/day   Sinemet CR continue on this     No therapy desired at this time     Pharmacy (MTM) consultation and medication management     Debating going somewhere - may visit in [facility]  Weather was good and not overly      Constipation  Consider taking something like senna-docusate or/and polyethylene glycol = miralax or glycolax     Pharmacy (MTM) consultation and medication management    His sleep is disrupted and sleeps 3 or 4 h ours and urinates and has problems going back to sleep  Lays there for a few hours. Last night slept well. Melatonin - has not been that helpful. Secondary insomnia.  Not sure gabapentin will keep you asleep.   Consider switching sleeping locations and monitor  body temperature  Consider sinemet at night if you are stiff.      Treating insomnia can be difficult    He continues on keppra 500mg in the morning and 2 x 500mg at night.      No problems with thinking or driving     He is a member of 2 health clubs - now he is 63 years of age and getting there 2 or 3 times per week and has a stationary bike and is fairly active.      He has some good days playing the [email or web address] and some days that things are not as good  He does not have clear wearing off.   He is taking 1.5 tabs 3/day  He is also taking the long acting sinemet 50/200  No falls  No freezing  Discussed research studies proprioception   He may be interested in projects.      Bowel habits have been pretty good - rare constipation. May take a powder laxative weekly or/and senokot 2 or 3 times per week.      No significant bladder urinary urgency and has nocturia 1/noc     Readers with his vision - he has not had cataract surgery     Cousin had parkinson   Would recommend free genetic testing  [email or web address]   [phone]  [email or web address]     He has a bit of voice involvement - muscle tension dysphonia/dystonia  He will wait on discussing the research - vibrating device that dr Luna and others are working on at the [place]. If interested in the future can reconsider th is.      He is considering part time work to get him out of the house.   Medical     1. Epilepsy, nocturnal seizures x2: Seizures are controlled on levetiracetam monotherapy.  Patient denies side effects.  Levetiracetam level was decreased by 500 mg in the previous visit.  There was no seizure recurrence.  Last seizure was 11/1/2019.  The patient inquires about coming off medication.  His EEG was normal, his MRI shows white matter ischemic changes.  I discussed potential risk of recurrence of seizures.  His seizures are nocturnal and there is a possibility of seizures  going unnoticed.  He agrees to continue on the medication, considering he has no side effects.  I suggested to decrease levetiracetam by 500 mg daily.     -Decrease levetiracetam to 500 mg twice a day.  -Obtain levetiracetam level in 1 week for efficacy.     2.  Mild Parkinson's disease: Patient's tremors are well-controlled.  He denies difficulty walking, imbalance, falls.  He follows with Dr. Sreedhar Kathleen.      - Continue taking levodopa-carbidopa 25/100, 3 times a day  - Continue carbidopa/levodopa, 50/200, ER, 1 tablet at bedtime     - Return to clinic in 6 months.        As described above, I met with the patient for 17 minutes and during this time counseling was greater than 50% of the visit time.  Aidee Buck MD          Medications     8a 10a 12p 4/5p 9/930 10p   Carbidopa/levodopa Sinemet CR 50/200         1     Carbidopa/levodopa Sinemet 25/100 1.5   1.5 1.5       Levetiracetam keppra 500mg   1       1.5   Polyethylene glycol miralax clearlax  prn             Senna senokot 8.6mg  prn                                                                  Plan:    He is doing part time work with [employer] - working 1/2 day   He is not sure about travel this winter  Discussed downloading the [employer] freddy for his medical records.   Neva and Ronel have proxy access    Bladder function - has a bit of dribbling and no incontinence.     He got a letter wanting an opinion about his ability to drive.     Blood pressure - not on medication - no issues.     Still need readers     He does not have clear wearing off.   He is taking 1.5 tabs 3/day  He is also taking the long acting sinemet 50/200  No falls  No freezing  No clear wearing off  He is riding a bike a few times a week  His balance is good.   He has some problems with fine motor function  Has some problems playing [employer]    Return back in 6 months.     rare constipation. Has been more regular  Has miralax or senna if he needs it.     voice involvement -  muscle tension dysphonia/dystonia     His sleep has been really good and has rare times has problems falling back asleep. May take up to 3 hours laying there and can get back asleep. He is  'great beto' and napping daily    His keppra is 500mg and 750mg and not 500mg twice daily - rx updated and refilled.   Will be seeing Dr. Buck 1/17/2025     He needs to have a form filled out     https://[place]ps.[place].gov/divisions/dvs/forms-documents/Documents/DL_LossofConsciousness_VoluntaryControlWaiver.pdf       Lab on 08/20/2024   Component Date Value Ref Range Status    Keppra (Levetiracetam) Level 08/20/2024 16.7  10.0 - 40.0  g/mL Final       Future Appointments 8/26/2024 - 2/22/2025        Date Visit Type Length Department Provider     1/17/2025 11:30 AM RETURN NEUROLOGY 30 min [facility] Aidee Buck MD                       Medical Decision Making:  #  Chronic progressive medical conditions addressed  voice, bowels, parkinson  Review and/or interpretation of unique test or documentation from a provider outside of neurology no   Independent historian provided additional details  no   Prescription drug management and review of potential side effects and/or monitoring for side effects  yes   Health impacted by social determinants of health  no    I have reviewed the note as documented above.  This accurately captures the substance of my conversation with the patient and total time spent preparing for visit, executing visit and completing visit on the day of the visit:  30 minutes.     The longitudinal plan of care for Marcus JLUIS Daley was addressed during this visit. Due to the added complexity in care, I will continue to support Marcus Daley in the subsequent management of this condition(s) and with the ongoing continuity of care of this condition(s).    Sreedhar Kathleen  "MD      ------------------------------------------------------------------------------------------------------------------------------------------------------------------------    Video-Visit Details    The patient has been notified of following:     \"After a review of the patient s situation, this visit was changed from an in-person visit to a video visit to reduce the risk of COVID-19 exposure.   The patient is being evaluated via a billable video visit.\"    \"This video visit will be conducted via a call between you and your physician/provider. We have found that certain health care needs can be provided without the need for an in-person physical exam.  This service lets us provide the care you need with a video conversation.  If a prescription is necessary we can send it directly to your pharmacy.  If lab work is needed we can place an order for that and you can then stop by our lab to have the test done at a later time.    If during the course of the call the physician/provider feels a video visit is not appropriate, you will not be charged for this service.\"     Patient has given verbal consent for Video visit? Yes    Patient would like the video invitation sent by:     Type of service:  Video Visit    Video Start Time:     Video End Time (time video stopped):     Duration:  minutes - see above    Originating Location (pt. Location):     Distant Location (provider location):  [facility] NEUROLOGY [facility]     Mode of Communication:  Video Conference via [employer]anna (and if not possible then doximity)      Sreedhar Kathleen MD      --------------------------------------------------------------------------------------------------------------    Marcus Daley is a 64 year old male who is being evaluated via a billable video visit.      Charts reviewed  Consult from  Images reviewed        I have reviewed and updated the patient's Past Medical History, Social History, Family History and Medication " List.    ALLERGIES  Patient has no known allergies.    Lasts visit details if there was a last visit:       14 Review of systems  are negative except for   Patient Active Problem List   Diagnosis    Paralysis agitans (H)    Parkinson's disease without dyskinesia or fluctuating manifestations (H)    Bilateral inguinal hernia    Seizure disorder (H)      No Known Allergies  Past Surgical History:   Procedure Laterality Date    COLONOSCOPY      COLONOSCOPY N/A 4/13/2023    Procedure: COLONOSCOPY, FLEXIBLE, WITH LESION REMOVAL USING SNARE;  Surgeon: Kalen Rose MD;  Location: [facility] OR    COLONOSCOPY WITH CO2 INSUFFLATION N/A 4/13/2023    Procedure: COLONOSCOPY, WITH CO2 INSUFFLATION;  Surgeon: Kalen Rose MD;  Location: [facility] OR    TONSILLECTOMY      age 2-3 years     Past Medical History:   Diagnosis Date    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     Social History     Socioeconomic History    Marital status:      Spouse name: Not on file    Number of children: 1    Years of education: Not on file    Highest education level: Not on file   Occupational History    Not on file   Tobacco Use    Smoking status: Some Days     Types: Cigars    Smokeless tobacco: Never    Tobacco comments:     Cigars, occasional, less than 60 total   Vaping Use    Vaping status: Never Used   Substance and Sexual Activity    Alcohol use: Yes     Alcohol/week: 7.0 - 8.0 standard drinks of alcohol     Types: 7 - 8 Standard drinks or equivalent per week    Drug use: No    Sexual activity: Yes   Other Topics Concern    Parent/sibling w/ CABG, MI or angioplasty before 65F 55M? Not Asked   Social History Narrative    Copier/field service repair/computer repair for 20-30 years        Daughter lives in [place]         Ronel retired wife retired  for [employer]        Lives in [place]         Social Determinants of Health     Financial Resource Strain: Low Risk  (3/5/2024)    Financial Resource  Strain     Within the past 12 months, have you or your family members you live with been unable to get utilities (heat, electricity) when it was really needed?: No   Food Insecurity: Low Risk  (3/5/2024)    Food Insecurity     Within the past 12 months, did you worry that your food would run out before you got money to buy more?: No     Within the past 12 months, did the food you bought just not last and you didn t have money to get more?: No   Transportation Needs: Low Risk  (3/5/2024)    Transportation Needs     Within the past 12 months, has lack of transportation kept you from medical appointments, getting your medicines, non-medical meetings or appointments, work, or from getting things that you need?: No   Physical Activity: Sufficiently Active (3/5/2024)    Exercise Vital Sign     Days of Exercise per Week: 5 days     Minutes of Exercise per Session: 60 min   Stress: No Stress Concern Present (3/5/2024)    [employer] [place] of Occupational [employer] - Occupational Stress Questionnaire     Feeling of Stress : Only a little   Social Connections: Unknown (3/5/2024)    Social Connection and Isolation Panel [NHANES]     Frequency of Communication with Friends and Family: Not on file     Frequency of Social Gatherings with Friends and Family: Once a week     Attends Taoist Services: Not on file     Active Member of Clubs or Organizations: Not on file     Attends Club or Organization Meetings: Not on file     Marital Status: Not on file   Interpersonal Safety: Low Risk  (3/6/2024)    Interpersonal Safety     Do you feel physically and emotionally safe where you currently live?: Yes     Within the past 12 months, have you been hit, slapped, kicked or otherwise physically hurt by someone?: No     Within the past 12 months, have you been humiliated or emotionally abused in other ways by your partner or ex-partner?: No   Housing Stability: Low Risk  (3/5/2024)    Housing Stability     Do you have housing? : Yes     Are  you worried about losing your housing?: No     Family History   Problem Relation Age of Onset    Suicide Mother     Depression Mother     Hypertension Father     Alcoholism Father     Other - See Comments Brother         fell 12 feet and hit head on pavement    Lung Cancer Brother         small cell lung cancer    Other - See Comments Daughter         lives in [place]    Parkinsonism Paternal Cousin     Impaired Fasting Glucose Paternal Uncle     Cerebrovascular Disease No family hx of      Current Outpatient Medications   Medication Sig Dispense Refill    carbidopa-levodopa (SINEMET CR)  MG CR tablet Take 1 tablet by mouth at bedtime 92 tablet 3    carbidopa-levodopa (SINEMET)  MG tablet Take 1.5 tablets by mouth 3 times daily 420 tablet 3    doxepin (SINEQUAN) 10 MG capsule Take 1 capsule (10 mg) by mouth at bedtime 30 capsule 11    levETIRAcetam (KEPPRA) 500 MG tablet Take 1 tablet (500 mg) by mouth 2 times daily 180 tablet 3    polyethylene glycol (MIRALAX) 17 g packet Take 17 g by mouth daily as needed for constipation      senna (SENOKOT) 8.6 MG tablet Take 1 tablet by mouth daily as needed for constipation

## 2024-07-29 ENCOUNTER — TELEPHONE (OUTPATIENT)
Dept: NEUROLOGY | Facility: CLINIC | Age: 64
End: 2024-07-29
Payer: COMMERCIAL

## 2024-07-29 NOTE — TELEPHONE ENCOUNTER
Left Voicemail (1st Attempt) and Sent [email or web address] (1st Attempt) for the patient to call back and schedule the following:    Appointment type: Return Seizure  Provider: Dr. Cruz  Return date: 1/26/2025  Specialty phone number: [phone]  Additional appointment(s) needed:   Additonal Notes:     Attempted to reach the patient and schedule a 6 month follow up due Jan 2025.    Also sent a Mashape message.    [facility]    [facility]   Neurology, NeuroSurgery, NeuroPsychology, Pain Management and Cardiology Specialties  Medical/Surgical Adult Specialties

## 2024-08-20 ENCOUNTER — LAB (OUTPATIENT)
Dept: LAB | Facility: CLINIC | Age: 64
End: 2024-08-20
Payer: COMMERCIAL

## 2024-08-20 DIAGNOSIS — G40.909 SEIZURE DISORDER (H): ICD-10-CM

## 2024-08-20 PROCEDURE — 36415 COLL VENOUS BLD VENIPUNCTURE: CPT

## 2024-08-20 PROCEDURE — 80177 DRUG SCRN QUAN LEVETIRACETAM: CPT

## 2024-08-21 LAB — LEVETIRACETAM SERPL-MCNC: 16.7 ΜG/ML (ref 10–40)

## 2024-08-26 ENCOUNTER — VIRTUAL VISIT (OUTPATIENT)
Dept: NEUROLOGY | Facility: CLINIC | Age: 64
End: 2024-08-26
Payer: COMMERCIAL

## 2024-08-26 DIAGNOSIS — G40.909 SEIZURE DISORDER (H): ICD-10-CM

## 2024-08-26 DIAGNOSIS — G20.A1 PARKINSON'S DISEASE, UNSPECIFIED WHETHER DYSKINESIA PRESENT, UNSPECIFIED WHETHER MANIFESTATIONS FLUCTUATE (H): Primary | ICD-10-CM

## 2024-08-26 PROCEDURE — 99214 OFFICE O/P EST MOD 30 MIN: CPT | Mod: 95 | Performed by: PSYCHIATRY & NEUROLOGY

## 2024-08-26 PROCEDURE — G2211 COMPLEX E/M VISIT ADD ON: HCPCS | Performed by: PSYCHIATRY & NEUROLOGY

## 2024-08-26 RX ORDER — CARBIDOPA AND LEVODOPA 50; 200 MG/1; MG/1
1 TABLET, EXTENDED RELEASE ORAL AT BEDTIME
Qty: 92 TABLET | Refills: 3 | Status: SHIPPED | OUTPATIENT
Start: 2024-08-26

## 2024-08-26 RX ORDER — LEVETIRACETAM 500 MG/1
TABLET ORAL
Qty: 230 TABLET | Refills: 3 | Status: SHIPPED | OUTPATIENT
Start: 2024-08-26

## 2024-08-26 RX ORDER — CARBIDOPA AND LEVODOPA 25; 100 MG/1; MG/1
1.5 TABLET ORAL 3 TIMES DAILY
Qty: 450 TABLET | Refills: 3 | Status: SHIPPED | OUTPATIENT
Start: 2024-08-26

## 2024-08-26 NOTE — PATIENT INSTRUCTIONS
Medications     8a 10a 12p 4/5p 9/930 10p   Carbidopa/levodopa Sinemet CR 50/200         1     Carbidopa/levodopa Sinemet 25/100 1.5   1.5 1.5       Levetiracetam keppra 500mg   1       1.5   Polyethylene glycol miralax clearlax  prn             Senna senokot 8.6mg  prn                                                                  Plan:    He is doing part time work with [employer] - working 1/2 day   He is not sure about travel this winter  Discussed downloading the [facility] freddy for his medical records.   Neva and Ronel have proxy access    Bladder function - has a bit of dribbling and no incontinence.     He got a letter wanting an opinion about his ability to drive.     Blood pressure - not on medication - no issues.     Still need readers     He does not have clear wearing off.   He is taking 1.5 tabs 3/day  He is also taking the long acting sinemet 50/200  No falls  No freezing  No clear wearing off  He is riding a bike a few times a week  His balance is good.   He has some problems with fine motor function  Has some problems playing [employer]    Return back in 6 months.     rare constipation. Has been more regular  Has miralax or senna if he needs it.     voice involvement - muscle tension dysphonia/dystonia     His sleep has been really good and has rare times has problems falling back asleep. May take up to 3 hours laying there and can get back asleep. He is  'great beto' and napping daily    His keppra is 500mg and 750mg and not 500mg twice daily - rx updated and refilled.   Will be seeing Dr. Buck 1/17/2025     He needs to have a form filled out     https://[place]ps.[place]/divisions/dvs/forms-documents/Documents/DL_LossofConsciousness_VoluntaryControlWaiver.pdf       Lab on 08/20/2024   Component Date Value Ref Range Status    Keppra (Levetiracetam) Level 08/20/2024 16.7  10.0 - 40.0  g/mL Final       Future Appointments 8/26/2024 - 2/22/2025        Date Visit Type Length Department Provider      1/17/2025 11:30 AM RETURN NEUROLOGY 30 min  NEUROLOGY Aidee Buck MD

## 2024-08-26 NOTE — NURSING NOTE
Marcus is a 64 year old who is being evaluated via a billable video visit.    How would you like to obtain your AVS? MyChart  If the video visit is dropped, the invitation should be resent by: Send to e-mail at: [email or web address]  Will anyone else be joining your video visit? No    Video-Visit Details    Type of service:  Video Visit   Video End Time:  Originating Location (pt. Location):     Distant Location (provider location):    Platform used for Video Visit:   KENNETH Kasper, MARCO ANTONIO ([facility])

## 2024-08-26 NOTE — LETTER
8/26/2024      Marcus Daley  [address] [place] [address]      Dear Colleague,    Thank you for referring your patient, Marcus Daley, to the [facility] [place]. Please see a copy of my visit note below.        VIDEO VISIT    Date of Visit: Aug 26, 2024   Name: Marcus Daley  Date of Birth 1960  MRN: [id number]  [address] [place] [id number] [phone] (M)   [phone] (H)   [phone] (W)      [email or web address]  Ronel Thuy  [phone]     [email or web address]     Granted proxy access to his wife and daughter Neva     Assessment:  (G20) Paralysis agitans (H)  (primary encounter diagnosis)     FROM ANA 12/2021  We saw the patient in 2018 for mild hand tremor.  He also had a history of REM behavior disorder.  We started him on carbidopa levodopa and he has had an excellent response.  He really has had no change over the past 3 years.  He continues to have an excellent response to low-dose carbidopa levodopa 25 101 tablet 3 times a day.  He has virtually no tremor and no signs of progression.  He is happy with his Parkinson's response.  He has had a first cousin who has developed Parkinson's disease but no first-degree relatives.     VOICE CHANGES 2010 dysphonia  RIGHT ARM CHANGES NOTICED 2017 January 2018 CHANGES IN HANDWRITING  6/2018 DIAGNOSIS PARKINSON WITH DR PEREZ - HAD SEEN GENESIS PREVIOUSLY 5/2018     Review of diagnosis    Parkinson's disease      Avoidance of dopamine blockers   Not taking     Motor complication review   No clear wearing off and doing well.      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 years - [employer]  Tries to do daily stretches and aerobics  Has not done big and loud therapy  Keeping  active - tries to go in 2/week  And does aerobics and stationary bike     Cognitive/Driving   Driving   DOT physical October 2021 and will need another evaluation October 2022 and will have this done at [facility]      Mood   Will start golfing again  Mood down this winter  Sun room  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 instruction  No travel plans     Hallucinations/delusions   No shadows     Sleep   Goes to sleep 1130pm  Pretty quickly falls asleep  Snoring  Not clearly talking  Had one rare episode of physical movement  He punched through a pillow one occasion - RBD  Consider sleep consultation  May wake up to go the bathroom at 3am and usually can go back to sleep   Wakes up for the day at 8 or 830am   He is not taking melatonin 3-5mg and titrated upwards for rem sleep disorder     Bladder/Renal/Prostate/Gyn/Other   age related issues  Nocturia 1/noc  No clear urgency or frequency  Some dribbling     GI/Constipation/GERD   Constipation - occasional laxative  Bowel movements are daily or other day  discussion about management of constipation     ENDO/Lipid/DM/Bone density/Thyroid  denies     Cardio/heart/Hyper or Hypotensive   143/81 -   Denies blood pressure problems     Vision/Dry Eyes/Cataracts/Glaucoma/Macular   Denies eye problems     Heme/Anticoagulation/Antiplatelet/Anemia/Other  Not taking an aspirin     ENT/Resp  ANOSMIA/hyposmia  Dysphonia  Muscle tension dysphonia      Skin/Cancer/Seborrhea/other  Denies cancer   Skin screening examination for cancer at some point.      Musculoskeletal/Pain/Headache  Upper spine - stiff neck - manageable with heat or ice     Other:  SEIZURE free since November 2019     Seizure disorder.  The patient had 2 unprovoked seizures at nighttime.  He would scream and thrash and this was observed by his wife.  We started him on levetiracetam and escalated the dose to 1000 mg twice daily.  The last seizure was on 11/1/2019.  He has had no seizures since.  We permitted driving after 6  months of medical compliance and seizure-free interval.  He now wonders about getting a commercial license.  He is looked into it and if we write that he has a seizure disorder and not use the term epilepsy this may qualify him as he is 2 years seizure-free.     NORMAL HEAD CT SCAN 6/19/2019  1.  Normal exam.       BRAIN MRI 6/26/2019  1. No acute intracranial pathology.  2. Mild patchy white matter changes in the katarina which are similar to the prior study and may represent small vessel ischemic disease..      CERVICAL SPINE MRI 5/17/2018  1. Multilevel cervical spondylosis and degenerative spondylolisthesis. Moderate to advanced neural foraminal stenosis on the left at C6-7.  Moderate neural foraminal stenosis on the left at C3-4 and on the right at C4-5.  2. No significant spinal canal stenosis.  3. No cord signal abnormality.  4. Bilateral atlantooccipital assimilation.     eeg 6/27/2019  This is a normal waking and sleep EEG.     Return to see Dr. Buck and review the carlos Buck visit on 11/4/2022  Continuing on keppra and his level was 24.7 (10-40) on 11/15/2022  Taking levetiracetam keppra 500mg 2 tabs twice daily      Discussed medicare part D and medication coverage.      Discussed the medication schedule      Slight slowness on walking and asymmetric reduced arm swing.        11/3/2023 1045am check in and visit at 11am with Dr. Buck     Reviewed sinemet and discussed increasing it to 1.5 tabs 3/day   Sinemet CR continue on this     No therapy desired at this time     Pharmacy (MTM) consultation and medication management     Debating going somewhere - may visit in [facility]  Weather was good and not overly      Constipation  Consider taking something like senna-docusate or/and polyethylene glycol = miralax or glycolax     Pharmacy (MTM) consultation and medication management    His sleep is disrupted and sleeps 3 or 4 h ours and urinates  and has problems going back to sleep  Lays there for a few hours. Last night slept well. Melatonin - has not been that helpful. Secondary insomnia.  Not sure gabapentin will keep you asleep.   Consider switching sleeping locations and monitor body temperature  Consider sinemet at night if you are stiff.      Treating insomnia can be difficult    He continues on keppra 500mg in the morning and 2 x 500mg at night.      No problems with thinking or driving     He is a member of 2 health clubs - now he is 63 years of age and getting there 2 or 3 times per week and has a stationary bike and is fairly active.      He has some good days playing the [employer] and some days that things are not as good  He does not have clear wearing off.   He is taking 1.5 tabs 3/day  He is also taking the long acting sinemet 50/200  No falls  No freezing  Discussed research studies proprioception   He may be interested in projects.      Bowel habits have been pretty good - rare constipation. May take a powder laxative weekly or/and senokot 2 or 3 times per week.      No significant bladder urinary urgency and has nocturia 1/noc     Readers with his vision - he has not had cataract surgery     Cousin had parkinson   Would recommend free genetic testing  [email or web address]   [phone]  [email or web address]     He has a bit of voice involvement - muscle tension dysphonia/dystonia  He will wait on discussing the research - vibrating device that dr Luna and others are working on at the [facility]. If interested in the future can reconsider th is.      He is considering part time work to get him out of the house.   Medical     1. Epilepsy, nocturnal seizures x2: Seizures are controlled on levetiracetam monotherapy.  Patient denies side effects.  Levetiracetam level was decreased by 500 mg in the previous visit.  There was no seizure recurrence.  Last seizure was 11/1/2019.   The patient inquires about coming off medication.  His EEG was normal, his MRI shows white matter ischemic changes.  I discussed potential risk of recurrence of seizures.  His seizures are nocturnal and there is a possibility of seizures going unnoticed.  He agrees to continue on the medication, considering he has no side effects.  I suggested to decrease levetiracetam by 500 mg daily.     -Decrease levetiracetam to 500 mg twice a day.  -Obtain levetiracetam level in 1 week for efficacy.     2.  Mild Parkinson's disease: Patient's tremors are well-controlled.  He denies difficulty walking, imbalance, falls.  He follows with Dr. Sreedhar Kathleen.      - Continue taking levodopa-carbidopa 25/100, 3 times a day  - Continue carbidopa/levodopa, 50/200, ER, 1 tablet at bedtime     - Return to clinic in 6 months.        As described above, I met with the patient for 17 minutes and during this time counseling was greater than 50% of the visit time.  Aidee Buck MD          Medications     8a 10a 12p 4/5p 9/930 10p   Carbidopa/levodopa Sinemet CR 50/200         1     Carbidopa/levodopa Sinemet 25/100 1.5   1.5 1.5       Levetiracetam keppra 500mg   1       1.5   Polyethylene glycol miralax clearlax  prn             Senna senokot 8.6mg  prn                                                                  Plan:    He is doing part time work with [employer] - working 1/2 day   He is not sure about travel this winter  Discussed downloading the [employer] freddy for his medical records.   Neva and Ronel have proxy access    Bladder function - has a bit of dribbling and no incontinence.     He got a letter wanting an opinion about his ability to drive.     Blood pressure - not on medication - no issues.     Still need readers     He does not have clear wearing off.   He is taking 1.5 tabs 3/day  He is also taking the long acting sinemet 50/200  No falls  No freezing  No clear wearing off  He is riding a bike a few times a week  His  balance is good.   He has some problems with fine motor function  Has some problems playing guitar    Return back in 6 months.     rare constipation. Has been more regular  Has miralax or senna if he needs it.     voice involvement - muscle tension dysphonia/dystonia     His sleep has been really good and has rare times has problems falling back asleep. May take up to 3 hours laying there and can get back asleep. He is  'great beto' and napping daily    His keppra is 500mg and 750mg and not 500mg twice daily - rx updated and refilled.   Will be seeing Dr. Buck 1/17/2025     He needs to have a form filled out     https://[place].gov/divisions/dvs/forms-documents/Documents/DL_[employer]_VoluntaryControlWaiver.pdf       Lab on 08/20/2024   Component Date Value Ref Range Status     Keppra (Levetiracetam) Level 08/20/2024 16.7  10.0 - 40.0  g/mL Final       Future Appointments 8/26/2024 - 2/22/2025        Date Visit Type Length Department Provider     1/17/2025 11:30 AM RETURN NEUROLOGY 30 min [facility] Aidee Buck MD                       Medical Decision Making:  #  Chronic progressive medical conditions addressed  voice, bowels, parkinson  Review and/or interpretation of unique test or documentation from a provider outside of neurology no   Independent historian provided additional details  no   Prescription drug management and review of potential side effects and/or monitoring for side effects  yes   Health impacted by social determinants of health  no    I have reviewed the note as documented above.  This accurately captures the substance of my conversation with the patient and total time spent preparing for visit, executing visit and completing visit on the day of the visit:  30 minutes.     The longitudinal plan of care for Marcus Daley was addressed during this visit. Due to the added complexity in care, I will continue to support Marcus Daley in the subsequent  "management of this condition(s) and with the ongoing continuity of care of this condition(s).    Sreedhar Kathleen MD      ------------------------------------------------------------------------------------------------------------------------------------------------------------------------    Video-Visit Details    The patient has been notified of following:     \"After a review of the patient s situation, this visit was changed from an in-person visit to a video visit to reduce the risk of COVID-19 exposure.   The patient is being evaluated via a billable video visit.\"    \"This video visit will be conducted via a call between you and your physician/provider. We have found that certain health care needs can be provided without the need for an in-person physical exam.  This service lets us provide the care you need with a video conversation.  If a prescription is necessary we can send it directly to your pharmacy.  If lab work is needed we can place an order for that and you can then stop by our lab to have the test done at a later time.    If during the course of the call the physician/provider feels a video visit is not appropriate, you will not be charged for this service.\"     Patient has given verbal consent for Video visit? Yes    Patient would like the video invitation sent by:     Type of service:  Video Visit    Video Start Time:     Video End Time (time video stopped):     Duration:  minutes - see above    Originating Location (pt. Location):     Distant Location (provider location):  [facility] NEUROLOG[facility]     Mode of Communication:  Video Conference via Chartio (and if not possible then doximity)      Sreedhar Kathleen MD      --------------------------------------------------------------------------------------------------------------    Marcus Daley is a 64 year old male who is being evaluated via a billable video visit.      Charts reviewed  Consult from  Images reviewed        I " have reviewed and updated the patient's Past Medical History, Social History, Family History and Medication List.    ALLERGIES  Patient has no known allergies.    Lasts visit details if there was a last visit:       14 Review of systems  are negative except for   Patient Active Problem List   Diagnosis     Paralysis agitans (H)     Parkinson's disease without dyskinesia or fluctuating manifestations (H)     Bilateral inguinal hernia     Seizure disorder (H)      No Known Allergies  Past Surgical History:   Procedure Laterality Date     COLONOSCOPY       COLONOSCOPY N/A 4/13/2023    Procedure: COLONOSCOPY, FLEXIBLE, WITH LESION REMOVAL USING SNARE;  Surgeon: Kalen Rose MD;  Location: [facility] OR     COLONOSCOPY WITH CO2 INSUFFLATION N/A 4/13/2023    Procedure: COLONOSCOPY, WITH CO2 INSUFFLATION;  Surgeon: Kalen Rose MD;  Location: [facility] OR     TONSILLECTOMY      age 2-3 years     Past Medical History:   Diagnosis Date     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     Social History     Socioeconomic History     Marital status:      Spouse name: Not on file     Number of children: 1     Years of education: Not on file     Highest education level: Not on file   Occupational History     Not on file   Tobacco Use     Smoking status: Some Days     Types: Cigars     Smokeless tobacco: Never     Tobacco comments:     Cigars, occasional, less than 60 total   Vaping Use     Vaping status: Never Used   Substance and Sexual Activity     Alcohol use: Yes     Alcohol/week: 7.0 - 8.0 standard drinks of alcohol     Types: 7 - 8 Standard drinks or equivalent per week     Drug use: No     Sexual activity: Yes   Other Topics Concern     Parent/sibling w/ CABG, MI or angioplasty before 65F 55M? Not Asked   Social History Narrative    Copier/field service repair/computer repair for 20-30 years        Daughter lives in [place]         Ronel retired wife retired  for [email or web address]         Lives in [facility]     Financial Resource Strain: Low Risk  (3/5/2024)    Financial Resource Strain      Within the past 12 months, have you or your family members you live with been unable to get utilities (heat, electricity) when it was really needed?: No   Food Insecurity: Low Risk  (3/5/2024)    Food Insecurity      Within the past 12 months, did you worry that your food would run out before you got money to buy more?: No      Within the past 12 months, did the food you bought just not last and you didn t have money to get more?: No   Transportation Needs: Low Risk  (3/5/2024)    Transportation Needs      Within the past 12 months, has lack of transportation kept you from medical appointments, getting your medicines, non-medical meetings or appointments, work, or from getting things that you need?: No   Physical Activity: Sufficiently Active (3/5/2024)    Exercise Vital Sign      Days of Exercise per Week: 5 days      Minutes of Exercise per Session: 60 min   Stress: No Stress Concern Present (3/5/2024)    [employer] [place] [employer] Occupational [employer] - Occupational Stress Questionnaire      Feeling of Stress : Only a little   Social Connections: Unknown (3/5/2024)    Social Connection and Isolation Panel [NHANES]      Frequency of Communication with Friends and Family: Not on file      Frequency of Social Gatherings with Friends and Family: Once a week      Attends Mandaen Services: Not on file      Active Member of Clubs or Organizations: Not on file      Attends Club or Organization Meetings: Not on file      Marital Status: Not on file   Interpersonal Safety: Low Risk  (3/6/2024)    Interpersonal Safety      Do you feel physically and emotionally safe where you currently live?: Yes      Within the past 12 months, have you been hit, slapped, kicked or otherwise physically hurt by someone?: No      Within the past 12 months, have you been humiliated or emotionally abused in  other ways by your partner or ex-partner?: No   Housing Stability: Low Risk  (3/5/2024)    Housing Stability      Do you have housing? : Yes      Are you worried about losing your housing?: No     Family History   Problem Relation Age of Onset     Suicide Mother      Depression Mother      Hypertension Father      Alcoholism Father      Other - See Comments Brother         fell 12 feet and hit head on pavement     Lung Cancer Brother         small cell lung cancer     Other - See Comments Daughter         lives in [place]     Parkinsonism Paternal Cousin      Impaired Fasting Glucose Paternal Uncle      Cerebrovascular Disease No family hx of      Current Outpatient Medications   Medication Sig Dispense Refill     carbidopa-levodopa (SINEMET CR)  MG CR tablet Take 1 tablet by mouth at bedtime 92 tablet 3     carbidopa-levodopa (SINEMET)  MG tablet Take 1.5 tablets by mouth 3 times daily 420 tablet 3     doxepin (SINEQUAN) 10 MG capsule Take 1 capsule (10 mg) by mouth at bedtime 30 capsule 11     levETIRAcetam (KEPPRA) 500 MG tablet Take 1 tablet (500 mg) by mouth 2 times daily 180 tablet 3     polyethylene glycol (MIRALAX) 17 g packet Take 17 g by mouth daily as needed for constipation       senna (SENOKOT) 8.6 MG tablet Take 1 tablet by mouth daily as needed for constipation           Again, thank you for allowing me to participate in the care of your patient.        Sincerely,        Sreedhar Kathleen MD

## 2025-01-04 NOTE — PROGRESS NOTES
VIDEO VISIT    Date of Visit: Feb 3, 2025   Name: Marcus Daley  Date of Birth 1960  MRN [id number]    [address] [place] [address] [phone] (M)   [phone] (H)   [phone] (W)      [email or web address]  Ronel Daley  [phone]     [email or web address]     Granted proxy access to his wife and daughter Neva     Assessment:  (G20) Paralysis agitans (H)  (primary encounter diagnosis)     FROM ANA 12/2021  We saw the patient in 2018 for mild hand tremor.  He also had a history of REM behavior disorder.  We started him on carbidopa levodopa and he has had an excellent response.  He really has had no change over the past 3 years.  He continues to have an excellent response to low-dose carbidopa levodopa 25 101 tablet 3 times a day.  He has virtually no tremor and no signs of progression.  He is happy with his Parkinson's response.  He has had a first cousin who has developed Parkinson's disease but no first-degree relatives.     VOICE CHANGES 2010 dysphonia  RIGHT ARM CHANGES NOTICED 2017 January 2018 CHANGES IN HANDWRITING  6/2018 DIAGNOSIS PARKINSON WITH DR PEREZ - HAD SEEN GENESIS PREVIOUSLY 5/2018     Review of diagnosis    Parkinson's disease      Avoidance of dopamine blockers   Not taking     Motor complication review   No clear wearing off and doing well.      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 years - [employer]  Tries to do daily stretches and aerobics  Has not done big and loud therapy  Keeping active - tries to go in 2/week  And does aerobics and stationary bike     Cognitive/Driving   Driving   DOT physical October 2021 and will need another evaluation October 2022 and will have this done at [facility]      Mood   Will start golfing  again  Mood down this winter  Sun room  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 instruction  No travel plans     Hallucinations/delusions   No shadows     Sleep   Goes to sleep 1130pm  Pretty quickly falls asleep  Snoring  Not clearly talking  Had one rare episode of physical movement  He punched through a pillow one occasion - RBD  Consider sleep consultation  May wake up to go the bathroom at 3am and usually can go back to sleep   Wakes up for the day at 8 or 830am   He is not taking melatonin 3-5mg and titrated upwards for rem sleep disorder     Bladder/Renal/Prostate/Gyn/Other   age related issues  Nocturia 1/noc  No clear urgency or frequency  Some dribbling     GI/Constipation/GERD   Constipation - occasional laxative  Bowel movements are daily or other day  discussion about management of constipation     ENDO/Lipid/DM/Bone density/Thyroid  denies     Cardio/heart/Hyper or Hypotensive   143/81 -   Denies blood pressure problems     Vision/Dry Eyes/Cataracts/Glaucoma/Macular   Denies eye problems     Heme/Anticoagulation/Antiplatelet/Anemia/Other  Not taking an aspirin     ENT/Resp  ANOSMIA/hyposmia  Dysphonia  Muscle tension dysphonia      Skin/Cancer/Seborrhea/other  Denies cancer   Skin screening examination for cancer at some point.      Musculoskeletal/Pain/Headache  Upper spine - stiff neck - manageable with heat or ice     Other:  SEIZURE free since November 2019     Seizure disorder.  The patient had 2 unprovoked seizures at nighttime.  He would scream and thrash and this was observed by his wife.  We started him on levetiracetam and escalated the dose to 1000 mg twice daily.  The last seizure was on 11/1/2019.  He has had no seizures since.  We permitted driving after 6 months of medical compliance and seizure-free interval.  He now wonders about getting a commercial license.  He is looked into it and if we write that he has a seizure disorder and not use the term epilepsy this may qualify him as he is 2 years  seizure-free.     NORMAL HEAD CT SCAN 6/19/2019  1.  Normal exam.       BRAIN MRI 6/26/2019  1. No acute intracranial pathology.  2. Mild patchy white matter changes in the katarina which are similar to the prior study and may represent small vessel ischemic disease..      CERVICAL SPINE MRI 5/17/2018  1. Multilevel cervical spondylosis and degenerative spondylolisthesis. Moderate to advanced neural foraminal stenosis on the left at C6-7.  Moderate neural foraminal stenosis on the left at C3-4 and on the right at C4-5.  2. No significant spinal canal stenosis.  3. No cord signal abnormality.  4. Bilateral atlantooccipital assimilation.     eeg 6/27/2019  This is a normal waking and sleep EEG.     Return to see Dr. Buck and review the carlos Buck visit on 11/4/2022  Continuing on keppra and his level was 24.7 (10-40) on 11/15/2022  Taking levetiracetam keppra 500mg 2 tabs twice daily      Discussed medicare part D and medication coverage.      Discussed the medication schedule      Slight slowness on walking and asymmetric reduced arm swing.        11/3/2023 1045am check in and visit at 11am with Dr. Buck     Reviewed sinemet and discussed increasing it to 1.5 tabs 3/day   Sinemet CR continue on this     No therapy desired at this time     Pharmacy (MTM) consultation and medication management     Debating going somewhere - may visit in [facility]  Weather was good and not overly      Constipation  Consider taking something like senna-docusate or/and polyethylene glycol = miralax or glycolax     Pharmacy (MTM) consultation and medication management     His sleep is disrupted and sleeps 3 or 4 h ours and urinates and has problems going back to sleep  Lays there for a few hours. Last night slept well. Melatonin - has not been that helpful. Secondary insomnia.  Not sure gabapentin will keep you asleep.   Consider switching sleeping locations and monitor  body temperature  Consider sinemet at night if you are stiff.      Treating insomnia can be difficult     He continues on keppra 500mg in the morning and 2 x 500mg at night.      No problems with thinking or driving     He is a member of 2 health clubs - now he is 63 years of age and getting there 2 or 3 times per week and has a stationary bike and is fairly active.      He has some good days playing the [employer] and some days that things are not as good  He does not have clear wearing off.   He is taking 1.5 tabs 3/day  He is also taking the long acting sinemet 50/200  No falls  No freezing  Discussed research studies proprioception   He may be interested in projects.      Bowel habits have been pretty good - rare constipation. May take a powder laxative weekly or/and senokot 2 or 3 times per week.      No significant bladder urinary urgency and has nocturia 1/noc     Readers with his vision - he has not had cataract surgery     Cousin had parkinson   Would recommend free genetic testing  [email or web address]   [phone]  [email or web address]     He has a bit of voice involvement - muscle tension dysphonia/dystonia  He will wait on discussing the research - vibrating device that dr Luna and others are working on at the [facility]. If interested in the future can reconsider this.      He is considering part time work to get him out of the house.   Medical      1. Epilepsy, nocturnal seizures x2: Seizures are controlled on levetiracetam monotherapy.  Patient denies side effects.  Levetiracetam level was decreased by 500 mg in the previous visit.  There was no seizure recurrence.  Last seizure was 11/1/2019.  The patient inquires about coming off medication.  His EEG was normal, his MRI shows white matter ischemic changes.  I discussed potential risk of recurrence of seizures.  His seizures are nocturnal and there is a possibility of seizures  going unnoticed.  He agrees to continue on the medication, considering he has no side effects.  I suggested to decrease levetiracetam by 500 mg daily.     -Decrease levetiracetam to 500 mg twice a day.  -Obtain levetiracetam level in 1 week for efficacy.     2.  Mild Parkinson's disease: Patient's tremors are well-controlled.  He denies difficulty walking, imbalance, falls.  He follows with Dr. Sreedhar Kathleen.      - Continue taking levodopa-carbidopa 25/100, 3 times a day  - Continue carbidopa/levodopa, 50/200, ER, 1 tablet at bedtime     - Return to clinic in 6 months.      As described above, I met with the patient for 17 minutes and during this time counseling was greater than 50% of the visit time.  Aidee Buck MD     He is doing part time work with [employer] - working 1/2 day   He is not sure about travel this winter - no clear travel plans.   Discussed downloading the [employer] freddy for his medical records.   Neva and Ronel have proxy access     Bladder function - has a bit of dribbling and no incontinence.      He got a letter wanting an opinion about his ability to drive.      Blood pressure - not on medication - no issues.      Still need readers      He does not have clear wearing off.   He is taking 1.5 tabs 3/day  He is also taking the long acting sinemet 50/200  No falls  No freezing  No clear wearing off  He is riding a bike a few times a week  His balance is good.   He has some problems with fine motor function  Has some problems playing [employer]     Return back in 6 months.      rare constipation. Has been more regular  Has miralax or senna if he needs it.      voice involvement - muscle tension dysphonia/dystonia     His sleep has been really good and has rare times has problems falling back asleep. May take up to 3 hours laying there and can get back asleep. He is  'great beto' and napping daily     His keppra is 500mg and 750mg and not 500mg twice daily - rx updated and refilled.   Will be  seeing Dr. Buck 1/17/2025      He needs to have a form filled out      https://[place]ps.[place].gov/divisions/dvs/forms-documents/Documents/DL_[employer]_VoluntaryControlWaiver.pdf               Lab on 08/20/2024   Component Date Value Ref Range Status    Keppra (Levetiracetam) Level 08/20/2024 16.7  10.0 - 40.0  g/mL Final            Medications     8a 10a 11 4 9/930 10p   Calcium carbonate tums prn        Carbidopa/levodopa Sinemet CR 50/200         1     Carbidopa/levodopa Sinemet 25/100 1.5   1.5 1.5       Levetiracetam keppra 500mg   1       1.5   Omeprazole 20mg prilosec off        Polyethylene glycol miralax   prn             Senna senokot 8.6mg gummies prn                                                                Plan:    They are getting a bit out - for a super bowl party  - he may be cheering for [place] over [place]  He is doing part time work with [employer] - working 1/2 day once per week  He is not sure about travel this winter  Neva and Ronel have proxy access    Bladder function - has a bit of dribbling and no incontinence.   Nocturia 1/noc     His sleep is variable and has good and bad nights of sleep. He wakes up and goes to the bathroom and has problems falling back asleep but can get back to sleep - it is interrupted sleep. His sleep has been really good and has rare times has problems falling back asleep. May take up to 3 hours laying there and can get back asleep. He is  'great beto' and napping daily. He has secondary insomnia. He go to a lazy boy that may help. He has tried CBD and may help.     Spoke that he has secondary insomnia which is a common issue in Parkinson  He has a nap and helps  Wondering if there is a prn medication - no clear remedy that I had to offer  Consider gabapentin at bedtime that may help with secondary insomnia but really no clear answers  He would be interested in talking with pharmacy about options.   Told him to get out of bed and read,  etc.     Pharmacy (MTM) consultation and medication management  Please call the scheduling number @ [phone] to set up an appointment with pharmacists Suzan Lua, Luz Beard, or Amada Winn.      His keppra is 500mg and 750mg   Will be seeing Dr. Buck   No issues  Controlled well    Omeprazole 20mg prilosec  Polyethylene glycol miralax   Senna senokot 8.6mg gummies - gummies - fairly often     Carbidopa/levodopa Sinemet CR 50/200  - 1 tab at night.   Carbidopa/levodopa Sinemet 25/100 - 1.5 tab 3/day   No clear wearing off  No falls  Goes to the health club - [employer] - [employer] no longer there.  May see a band.   Has some problems playing guitar  Tries to do daily stretches and aerobics  Has not done big and loud therapy  Has stationary bike at home.   No change in parkinson medication  Return back in 6 months  Last seen was August 2024      Future Appointments 2/3/2025 - 8/2/2025        Date Visit Type Length Department Provider     2/21/2025  1:30 PM RETURN NEUROLOGY 30 min [facility] Aidee Buck MD                       Medical Decision Making:  #  Chronic progressive medical conditions addressed  yes  Review and/or interpretation of unique test or documentation from a provider outside of neurology no   Independent historian provided additional details  no   Prescription drug management and review of potential side effects and/or monitoring for side effects  yes   Health impacted by social determinants of health  no    I have reviewed the note as documented above.  This accurately captures the substance of my conversation with the patient and total time spent preparing for visit, executing visit and completing visit on the day of the visit:  30 minutes.     The longitudinal plan of care for Marcus BAZZI Rachaelky was addressed during this visit. Due to the added complexity in care, I will continue to support Marcus Daley in the subsequent management of this  "condition(s) and with the ongoing continuity of care of this condition(s).    Sreedhar Kathleen MD      ------------------------------------------------------------------------------------------------------------------------------------------------------------------------    Video-Visit Details    The patient has been notified of following:     \"After a review of the patient s situation, this visit was changed from an in-person visit to a video visit to reduce the risk of COVID-19 exposure.   The patient is being evaluated via a billable video visit.\"    \"This video visit will be conducted via a call between you and your physician/provider. We have found that certain health care needs can be provided without the need for an in-person physical exam.  This service lets us provide the care you need with a video conversation.  If a prescription is necessary we can send it directly to your pharmacy.  If lab work is needed we can place an order for that and you can then stop by our lab to have the test done at a later time.    If during the course of the call the physician/provider feels a video visit is not appropriate, you will not be charged for this service.\"     Patient has given verbal consent for Video visit? Yes    Patient would like the video invitation sent by:     Type of service:  Video Visit    Video Start Time:     Video End Time (time video stopped):     Duration:  minutes - see above    Originating Location (pt. Location):     Distant Location (provider location):  [facility] [place]     Mode of Communication:  Video Conference via [place] (and if not possible then doximity)      Sreedhar Kathleen MD      --------------------------------------------------------------------------------------------------------------    Marcus JLUIS Daley is a 64 year old male who is being evaluated via a billable video visit.      Charts reviewed  Consult from  Images reviewed        I have reviewed and " updated the patient's Past Medical History, Social History, Family History and Medication List.    ALLERGIES  Patient has no known allergies.    Lasts visit details if there was a last visit:       14 Review of systems  are negative except for   Patient Active Problem List   Diagnosis    Paralysis agitans (H)    Parkinson's disease without dyskinesia or fluctuating manifestations (H)    Bilateral inguinal hernia    Seizure disorder (H)      No Known Allergies  Past Surgical History:   Procedure Laterality Date    COLONOSCOPY      COLONOSCOPY N/A 4/13/2023    Procedure: COLONOSCOPY, FLEXIBLE, WITH LESION REMOVAL USING SNARE;  Surgeon: Kalen Rose MD;  Location: [facility] OR    COLONOSCOPY WITH CO2 INSUFFLATION N/A 4/13/2023    Procedure: COLONOSCOPY, WITH CO2 INSUFFLATION;  Surgeon: Kalen Rose MD;  Location: [facility] OR    TONSILLECTOMY      age 1-3 years     Past Medical History:   Diagnosis Date    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     Social History     Socioeconomic History    Marital status:      Spouse name: Not on file    Number of children: 1    Years of education: Not on file    Highest education level: Not on file   Occupational History    Not on file   Tobacco Use    Smoking status: Some Days     Types: Cigars    Smokeless tobacco: Never    Tobacco comments:     Cigars, occasional, less than 60 total   Vaping Use    Vaping status: Never Used   Substance and Sexual Activity    Alcohol use: Yes     Alcohol/week: 7.0 - 8.0 standard drinks of alcohol     Types: 7 - 8 Standard drinks or equivalent per week    Drug use: No    Sexual activity: Yes   Other Topics Concern    Parent/sibling w/ CABG, MI or angioplasty before 65F 55M? Not Asked   Social History Narrative    Copier/field service repair/computer repair for 20-30 years        Daughter lives in [place]         Ronel retired wife retired  for [email or web address]        Lives in [place]         [employer]     Financial Resource Strain: Low Risk  (3/5/2024)    Financial Resource Strain     Within the past 12 months, have you or your family members you live with been unable to get utilities (heat, electricity) when it was really needed?: No   Food Insecurity: Low Risk  (3/5/2024)    Food Insecurity     Within the past 12 months, did you worry that your food would run out before you got money to buy more?: No     Within the past 12 months, did the food you bought just not last and you didn t have money to get more?: No   Transportation Needs: Low Risk  (3/5/2024)    Transportation Needs     Within the past 12 months, has lack of transportation kept you from medical appointments, getting your medicines, non-medical meetings or appointments, work, or from getting things that you need?: No   Physical Activity: Sufficiently Active (3/5/2024)    Exercise Vital Sign     Days of Exercise per Week: 5 days     Minutes of Exercise per Session: 60 min   Stress: No Stress Concern Present (3/5/2024)    [employer] [employer] - Occupational Stress Questionnaire     Feeling of Stress : Only a little   Social Connections: Unknown (3/5/2024)    Social Connection and Isolation Panel [NHANES]     Frequency of Communication with Friends and Family: Not on file     Frequency of Social Gatherings with Friends and Family: Once a week     Attends [employer] Services: Not on file     Active Member of Clubs or Organizations: Not on file     Attends Club or Organization Meetings: Not on file     Marital Status: Not on file   Interpersonal Safety: Low Risk  (3/6/2024)    Interpersonal Safety     Do you feel physically and emotionally safe where you currently live?: Yes     Within the past 12 months, have you been hit, slapped, kicked or otherwise physically hurt by someone?: No     Within the past 12 months, have you been humiliated or emotionally abused in other ways by your partner or ex-partner?: No   Housing  Stability: Low Risk  (3/5/2024)    Housing Stability     Do you have housing? : Yes     Are you worried about losing your housing?: No     Family History   Problem Relation Age of Onset    Suicide Mother     Depression Mother     Hypertension Father     Alcoholism Father     Other - See Comments Brother         fell 12 feet and hit head on pavement    Lung Cancer Brother         small cell lung cancer    Other - See Comments Daughter         lives in [place]    Parkinsonism Paternal Cousin     Impaired Fasting Glucose Paternal Uncle     Cerebrovascular Disease No family hx of      Current Outpatient Medications   Medication Sig Dispense Refill    carbidopa-levodopa (SINEMET CR)  MG CR tablet Take 1 tablet by mouth at bedtime. 92 tablet 3    carbidopa-levodopa (SINEMET)  MG tablet Take 1.5 tablets by mouth 3 times daily. 450 tablet 3    levETIRAcetam (KEPPRA) 500 MG tablet 500mg in the morning and 1.5 x 500mg at night = 2.5 tabs/day 230 tablet 3    polyethylene glycol (MIRALAX) 17 g packet Take 17 g by mouth daily as needed for constipation      senna (SENOKOT) 8.6 MG tablet Take 1 tablet by mouth daily as needed for constipation

## 2025-02-02 RX ORDER — OMEPRAZOLE 20 MG/1
20 CAPSULE, DELAYED RELEASE ORAL DAILY
COMMUNITY
End: 2025-02-03

## 2025-02-03 ENCOUNTER — VIRTUAL VISIT (OUTPATIENT)
Dept: NEUROLOGY | Facility: CLINIC | Age: 65
End: 2025-02-03
Payer: COMMERCIAL

## 2025-02-03 DIAGNOSIS — G47.09 SECONDARY INSOMNIA: ICD-10-CM

## 2025-02-03 DIAGNOSIS — G20.A1 PARKINSON'S DISEASE, UNSPECIFIED WHETHER DYSKINESIA PRESENT, UNSPECIFIED WHETHER MANIFESTATIONS FLUCTUATE (H): Primary | ICD-10-CM

## 2025-02-03 PROCEDURE — G2211 COMPLEX E/M VISIT ADD ON: HCPCS | Performed by: PSYCHIATRY & NEUROLOGY

## 2025-02-03 PROCEDURE — 98006 SYNCH AUDIO-VIDEO EST MOD 30: CPT | Performed by: PSYCHIATRY & NEUROLOGY

## 2025-02-03 RX ORDER — CALCIUM CARBONATE 500 MG/1
TABLET, CHEWABLE ORAL
COMMUNITY

## 2025-02-03 NOTE — LETTER
2/3/2025      Marcus Daley  [address] [place] [address]      Dear Colleague,    Thank you for referring your patient, Marcus Daley, to the [facility] [place]. Please see a copy of my visit note below.      VIDEO VISIT    Date of Visit: Feb 3, 2025   Name: Marcus Daley  Date of Birth 1960  MRN [id number]    [address] [place] [id number] [phone] (M)   [phone] (H)   [phone] (W)      [email or web address]  Ronel Thuy  [phone]     [email or web address]     Granted proxy access to his wife and daughter Neva     Assessment:  (G20) Paralysis agitans (H)  (primary encounter diagnosis)     FROM ANA 12/2021  We saw the patient in 2018 for mild hand tremor.  He also had a history of REM behavior disorder.  We started him on carbidopa levodopa and he has had an excellent response.  He really has had no change over the past 3 years.  He continues to have an excellent response to low-dose carbidopa levodopa 25 101 tablet 3 times a day.  He has virtually no tremor and no signs of progression.  He is happy with his Parkinson's response.  He has had a first cousin who has developed Parkinson's disease but no first-degree relatives.     VOICE CHANGES 2010 dysphonia  RIGHT ARM CHANGES NOTICED 2017 January 2018 CHANGES IN HANDWRITING  6/2018 DIAGNOSIS PARKINSON WITH DR PEREZ - HAD SEEN GENESIS PREVIOUSLY 5/2018     Review of diagnosis    Parkinson's disease      Avoidance of dopamine blockers   Not taking     Motor complication review   No clear wearing off and doing well.      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 years - [employer]  Tries to do daily stretches and aerobics  Has not done big and loud therapy  Keeping  active - tries to go in 2/week  And does aerobics and stationary bike     Cognitive/Driving   Driving   DOT physical October 2021 and will need another evaluation October 2022 and will have this done at [facility]      Mood   Will start golfing again  Mood down this winter  Sun room  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 instruction  No travel plans     Hallucinations/delusions   No shadows     Sleep   Goes to sleep 1130pm  Pretty quickly falls asleep  Snoring  Not clearly talking  Had one rare episode of physical movement  He punched through a pillow one occasion - RBD  Consider sleep consultation  May wake up to go the bathroom at 3am and usually can go back to sleep   Wakes up for the day at 8 or 830am   He is not taking melatonin 3-5mg and titrated upwards for rem sleep disorder     Bladder/Renal/Prostate/Gyn/Other   age related issues  Nocturia 1/noc  No clear urgency or frequency  Some dribbling     GI/Constipation/GERD   Constipation - occasional laxative  Bowel movements are daily or other day  discussion about management of constipation     ENDO/Lipid/DM/Bone density/Thyroid  denies     Cardio/heart/Hyper or Hypotensive   143/81 -   Denies blood pressure problems     Vision/Dry Eyes/Cataracts/Glaucoma/Macular   Denies eye problems     Heme/Anticoagulation/Antiplatelet/Anemia/Other  Not taking an aspirin     ENT/Resp  ANOSMIA/hyposmia  Dysphonia  Muscle tension dysphonia      Skin/Cancer/Seborrhea/other  Denies cancer   Skin screening examination for cancer at some point.      Musculoskeletal/Pain/Headache  Upper spine - stiff neck - manageable with heat or ice     Other:  SEIZURE free since November 2019     Seizure disorder.  The patient had 2 unprovoked seizures at nighttime.  He would scream and thrash and this was observed by his wife.  We started him on levetiracetam and escalated the dose to 1000 mg twice daily.  The last seizure was on 11/1/2019.  He has had no seizures since.  We permitted driving after 6  months of medical compliance and seizure-free interval.  He now wonders about getting a commercial license.  He is looked into it and if we write that he has a seizure disorder and not use the term epilepsy this may qualify him as he is 2 years seizure-free.     NORMAL HEAD CT SCAN 6/19/2019  1.  Normal exam.       BRAIN MRI 6/26/2019  1. No acute intracranial pathology.  2. Mild patchy white matter changes in the katarina which are similar to the prior study and may represent small vessel ischemic disease..      CERVICAL SPINE MRI 5/17/2018  1. Multilevel cervical spondylosis and degenerative spondylolisthesis. Moderate to advanced neural foraminal stenosis on the left at C6-7.  Moderate neural foraminal stenosis on the left at C3-4 and on the right at C4-5.  2. No significant spinal canal stenosis.  3. No cord signal abnormality.  4. Bilateral atlantooccipital assimilation.     eeg 6/27/2019  This is a normal waking and sleep EEG.     Return to see Dr. Buck and review the carlos Buck visit on 11/4/2022  Continuing on keppra and his level was 24.7 (10-40) on 11/15/2022  Taking levetiracetam keppra 500mg 2 tabs twice daily      Discussed medicare part D and medication coverage.      Discussed the medication schedule      Slight slowness on walking and asymmetric reduced arm swing.        11/3/2023 1045am check in and visit at 11am with Dr. Buck     Reviewed sinemet and discussed increasing it to 1.5 tabs 3/day   Sinemet CR continue on this     No therapy desired at this time     Pharmacy (MTM) consultation and medication management     Debating going somewhere - may visit in [facility]  Weather was good and not overly      Constipation  Consider taking something like senna-docusate or/and polyethylene glycol = miralax or glycolax     Pharmacy (MTM) consultation and medication management     His sleep is disrupted and sleeps 3 or 4 h ours and urinates  and has problems going back to sleep  Lays there for a few hours. Last night slept well. Melatonin - has not been that helpful. Secondary insomnia.  Not sure gabapentin will keep you asleep.   Consider switching sleeping locations and monitor body temperature  Consider sinemet at night if you are stiff.      Treating insomnia can be difficult     He continues on keppra 500mg in the morning and 2 x 500mg at night.      No problems with thinking or driving     He is a member of 2 health clubs - now he is 63 years of age and getting there 2 or 3 times per week and has a stationary bike and is fairly active.      He has some good days playing the [employer] and some days that things are not as good  He does not have clear wearing off.   He is taking 1.5 tabs 3/day  He is also taking the long acting sinemet 50/200  No falls  No freezing  Discussed research studies proprioception   He may be interested in projects.      Bowel habits have been pretty good - rare constipation. May take a powder laxative weekly or/and senokot 2 or 3 times per week.      No significant bladder urinary urgency and has nocturia 1/noc     Readers with his vision - he has not had cataract surgery     Cousin had parkinson   Would recommend free genetic testing  [email or web address]   [phone]  [email or web address]     He has a bit of voice involvement - muscle tension dysphonia/dystonia  He will wait on discussing the research - vibrating device that dr Luna and others are working on at the [facility]. If interested in the future can reconsider this.      He is considering part time work to get him out of the house.   Medical      1. Epilepsy, nocturnal seizures x2: Seizures are controlled on levetiracetam monotherapy.  Patient denies side effects.  Levetiracetam level was decreased by 500 mg in the previous visit.  There was no seizure recurrence.  Last seizure was 11/1/2019.   The patient inquires about coming off medication.  His EEG was normal, his MRI shows white matter ischemic changes.  I discussed potential risk of recurrence of seizures.  His seizures are nocturnal and there is a possibility of seizures going unnoticed.  He agrees to continue on the medication, considering he has no side effects.  I suggested to decrease levetiracetam by 500 mg daily.     -Decrease levetiracetam to 500 mg twice a day.  -Obtain levetiracetam level in 1 week for efficacy.     2.  Mild Parkinson's disease: Patient's tremors are well-controlled.  He denies difficulty walking, imbalance, falls.  He follows with Dr. Sreedhar Kathleen.      - Continue taking levodopa-carbidopa 25/100, 3 times a day  - Continue carbidopa/levodopa, 50/200, ER, 1 tablet at bedtime     - Return to clinic in 6 months.      As described above, I met with the patient for 17 minutes and during this time counseling was greater than 50% of the visit time.  Aidee Buck MD     He is doing part time work with [employer] - working 1/2 day   He is not sure about travel this winter - no clear travel plans.   Discussed downloading the Bitly freddy for his medical records.   Neva and Ronel have proxy access     Bladder function - has a bit of dribbling and no incontinence.      He got a letter wanting an opinion about his ability to drive.      Blood pressure - not on medication - no issues.      Still need readers      He does not have clear wearing off.   He is taking 1.5 tabs 3/day  He is also taking the long acting sinemet 50/200  No falls  No freezing  No clear wearing off  He is riding a bike a few times a week  His balance is good.   He has some problems with fine motor function  Has some problems playing [employer]     Return back in 6 months.      rare constipation. Has been more regular  Has miralax or senna if he needs it.      voice involvement - muscle tension dysphonia/dystonia     His sleep has been really good and has rare  times has problems falling back asleep. May take up to 3 hours laying there and can get back asleep. He is  'great beto' and napping daily     His keppra is 500mg and 750mg and not 500mg twice daily - rx updated and refilled.   Will be seeing Dr. Buck 1/17/2025      He needs to have a form filled out      https://[place].gov/divisions/dvs/forms-documents/Documents/DL_[employer]_VoluntaryControlWaiver.pdf               Lab on 08/20/2024   Component Date Value Ref Range Status    Keppra (Levetiracetam) Level 08/20/2024 16.7  10.0 - 40.0  g/mL Final            Medications     8a 10a 11 4 9/930 10p   Calcium carbonate tums prn        Carbidopa/levodopa Sinemet CR 50/200         1     Carbidopa/levodopa Sinemet 25/100 1.5   1.5 1.5       Levetiracetam keppra 500mg   1       1.5   Omeprazole 20mg prilosec off        Polyethylene glycol miralax   prn             Senna senokot 8.6mg gummies prn                                                                Plan:    They are getting a bit out - for a super bowl party  - he may be cheering for [place] over [place]  He is doing part time work with [employer] - working 1/2 day once per week  He is not sure about travel this winter  [facility] and Ronel have proxy access    Bladder function - has a bit of dribbling and no incontinence.   Nocturia 1/noc     His sleep is variable and has good and bad nights of sleep. He wakes up and goes to the bathroom and has problems falling back asleep but can get back to sleep - it is interrupted sleep. His sleep has been really good and has rare times has problems falling back asleep. May take up to 3 hours laying there and can get back asleep. He is  'great beto' and napping daily. He has secondary insomnia. He go to a lazy boy that may help. He has tried CBD and may help.     Spoke that he has secondary insomnia which is a common issue in Parkinson  He has a nap and helps  Wondering if there is a prn medication -  no clear remedy that I had to offer  Consider gabapentin at bedtime that may help with secondary insomnia but really no clear answers  He would be interested in talking with pharmacy about options.   Told him to get out of bed and read, etc.     Pharmacy (MTM) consultation and medication management  Please call the scheduling number @ [phone] to set up an appointment with pharmacists Suzan Lua, Luz Beard, or Amada Winn.      His keppra is 500mg and 750mg   Will be seeing Dr. Buck   No issues  Controlled well    Omeprazole 20mg prilosec  Polyethylene glycol miralax   Senna senokot 8.6mg gummies - gummies - fairly often     Carbidopa/levodopa Sinemet CR 50/200  - 1 tab at night.   Carbidopa/levodopa Sinemet 25/100 - 1.5 tab 3/day   No clear wearing off  No falls  Goes to the health club - [employer] - [employer] no longer there.  May see a band.   Has some problems playing guitar  Tries to do daily stretches and aerobics  Has not done big and loud therapy  Has stationary bike at home.   No change in parkinson medication  Return back in 6 months  Last seen was August 2024      Future Appointments 2/3/2025 - 8/2/2025        Date Visit Type Length Department Provider     2/21/2025  1:30 PM RETURN NEUROLOGY 30 min [facility] Aidee Buck MD                     Medical Decision Making:  #  Chronic progressive medical conditions addressed  yes  Review and/or interpretation of unique test or documentation from a provider outside of neurology no   Independent historian provided additional details  no   Prescription drug management and review of potential side effects and/or monitoring for side effects  yes   Health impacted by social determinants of health  no    I have reviewed the note as documented above.  This accurately captures the substance of my conversation with the patient and total time spent preparing for visit, executing visit and completing visit on the day of the  "visit:  30 minutes.     The longitudinal plan of care for Marcus Daley was addressed during this visit. Due to the added complexity in care, I will continue to support Marcus Daley in the subsequent management of this condition(s) and with the ongoing continuity of care of this condition(s).    Sreedhar Kathleen MD      ------------------------------------------------------------------------------------------------------------------------------------------------------------------------    Video-Visit Details    The patient has been notified of following:     \"After a review of the patient s situation, this visit was changed from an in-person visit to a video visit to reduce the risk of COVID-19 exposure.   The patient is being evaluated via a billable video visit.\"    \"This video visit will be conducted via a call between you and your physician/provider. We have found that certain health care needs can be provided without the need for an in-person physical exam.  This service lets us provide the care you need with a video conversation.  If a prescription is necessary we can send it directly to your pharmacy.  If lab work is needed we can place an order for that and you can then stop by our lab to have the test done at a later time.    If during the course of the call the physician/provider feels a video visit is not appropriate, you will not be charged for this service.\"     Patient has given verbal consent for Video visit? Yes    Patient would like the video invitation sent by:     Type of service:  Video Visit    Video Start Time:     Video End Time (time video stopped):     Duration:  minutes - see above    Originating Location (pt. Location):     Distant Location (provider location):  [facility]     Mode of Communication:  Video Conference via [employer] (and if not possible then doximity)      Sreedhar Kathleen" MD    --------------------------------------------------------------------------------------------------------------    Marcus Daley is a 64 year old male who is being evaluated via a billable video visit.      Charts reviewed  Consult from  Images reviewed      I have reviewed and updated the patient's Past Medical History, Social History, Family History and Medication List.    ALLERGIES  Patient has no known allergies.    Lasts visit details if there was a last visit:       14 Review of systems  are negative except for   Patient Active Problem List   Diagnosis    Paralysis agitans (H)    Parkinson's disease without dyskinesia or fluctuating manifestations (H)    Bilateral inguinal hernia    Seizure disorder (H)      No Known Allergies  Past Surgical History:   Procedure Laterality Date    COLONOSCOPY      COLONOSCOPY N/A 4/13/2023    Procedure: COLONOSCOPY, FLEXIBLE, WITH LESION REMOVAL USING SNARE;  Surgeon: Kalen Rose MD;  Location: [facility] OR    COLONOSCOPY WITH CO2 INSUFFLATION N/A 4/13/2023    Procedure: COLONOSCOPY, WITH CO2 INSUFFLATION;  Surgeon: Kalen Rose MD;  Location: [facility] OR    TONSILLECTOMY      age 2-3 years     Past Medical History:   Diagnosis Date    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     Social History     Socioeconomic History    Marital status:      Spouse name: Not on file    Number of children: 1    Years of education: Not on file    Highest education level: Not on file   Occupational History    Not on file   Tobacco Use    Smoking status: Some Days     Types: Cigars    Smokeless tobacco: Never    Tobacco comments:     Cigars, occasional, less than 60 total   Vaping Use    Vaping status: Never Used   Substance and Sexual Activity    Alcohol use: Yes     Alcohol/week: 7.0 - 8.0 standard drinks of alcohol     Types: 7 - 8 Standard drinks or equivalent per week    Drug use: No    Sexual activity: Yes   Other Topics Concern    Parent/sibling w/ CABG, MI or  angioplasty before 65F 55M? Not Asked   Social History Narrative    Copier/field service repair/computer repair for 20-30 years        Daughter lives in [place]         Ronel retired wife retired  for [employer]        Lives in [place]         Social Drivers of Health     Financial Resource Strain: Low Risk  (3/5/2024)    Financial Resource Strain     Within the past 12 months, have you or your family members you live with been unable to get utilities (heat, electricity) when it was really needed?: No   Food Insecurity: Low Risk  (3/5/2024)    Food Insecurity     Within the past 12 months, did you worry that your food would run out before you got money to buy more?: No     Within the past 12 months, did the food you bought just not last and you didn t have money to get more?: No   Transportation Needs: Low Risk  (3/5/2024)    Transportation Needs     Within the past 12 months, has lack of transportation kept you from medical appointments, getting your medicines, non-medical meetings or appointments, work, or from getting things that you need?: No   Physical Activity: Sufficiently Active (3/5/2024)    Exercise Vital Sign     Days of Exercise per Week: 5 days     Minutes of Exercise per Session: 60 min   Stress: No Stress Concern Present (3/5/2024)    [place] citizen [place] of Occupational Health - Occupational Stress Questionnaire     Feeling of Stress : Only a little   Social Connections: Unknown (3/5/2024)    Social Connection and Isolation Panel [NHANES]     Frequency of Communication with Friends and Family: Not on file     Frequency of Social Gatherings with Friends and Family: Once a week     Attends Restorationism Services: Not on file     Active Member of Clubs or Organizations: Not on file     Attends Club or Organization Meetings: Not on file     Marital Status: Not on file   Interpersonal Safety: Low Risk  (3/6/2024)    Interpersonal Safety     Do you feel physically and  emotionally safe where you currently live?: Yes     Within the past 12 months, have you been hit, slapped, kicked or otherwise physically hurt by someone?: No     Within the past 12 months, have you been humiliated or emotionally abused in other ways by your partner or ex-partner?: No   Housing Stability: Low Risk  (3/5/2024)    Housing Stability     Do you have housing? : Yes     Are you worried about losing your housing?: No     Family History   Problem Relation Age of Onset    Suicide Mother     Depression Mother     Hypertension Father     Alcoholism Father     Other - See Comments Brother         fell 12 feet and hit head on pavement    Lung Cancer Brother         small cell lung cancer    Other - See Comments Daughter         lives in [place]    Parkinsonism Paternal Cousin     Impaired Fasting Glucose Paternal Uncle     Cerebrovascular Disease No family hx of      Current Outpatient Medications   Medication Sig Dispense Refill    carbidopa-levodopa (SINEMET CR)  MG CR tablet Take 1 tablet by mouth at bedtime. 92 tablet 3    carbidopa-levodopa (SINEMET)  MG tablet Take 1.5 tablets by mouth 3 times daily. 450 tablet 3    levETIRAcetam (KEPPRA) 500 MG tablet 500mg in the morning and 1.5 x 500mg at night = 2.5 tabs/day 230 tablet 3    polyethylene glycol (MIRALAX) 17 g packet Take 17 g by mouth daily as needed for constipation      senna (SENOKOT) 8.6 MG tablet Take 1 tablet by mouth daily as needed for constipation       Marcus is a 64 year old who is being evaluated via a billable video visit.    How would you like to obtain your AVS? MyChart  If the video visit is dropped, the invitation should be resent by: Send to e-mail at: [email or web address]  Will anyone else be joining your video visit? No    Video-Visit Details    Type of service:  Video Visit   Video End Time:  Originating Location (pt. Location):     Distant Location (provider location):    Platform used for Video Visit:   Wilton Dominguez  MARCO ANTONIO COOPER (AAMA)      Again, thank you for allowing me to participate in the care of your patient.      Sincerely,    Sreedhar Kathleen MD  Electronically signed

## 2025-02-03 NOTE — PROGRESS NOTES
Marcus is a 64 year old who is being evaluated via a billable video visit.    How would you like to obtain your AVS? MyChart  If the video visit is dropped, the invitation should be resent by: Send to e-mail at: [email or web address]  Will anyone else be joining your video visit? No    Video-Visit Details    Type of service:  Video Visit   Video End Time:  Originating Location (pt. Location):     Distant Location (provider location):    Platform used for Video Visit:   KENNETH Kasper, MARCO ANTONIO ([facility])

## 2025-02-03 NOTE — PATIENT INSTRUCTIONS
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   Nocturia 1/noc     His sleep is variable and has good and bad nights of sleep. He wakes up and goes to the bathroom and has problems falling back asleep but can get back to sleep - it is interrupted sleep. His sleep has been really good and has rare times has problems falling back asleep. May take up to 3 hours laying there and can get back asleep. He is  'great beto' and napping daily. He has secondary insomnia. He go to a lazy boy that may help. He has tried CBD and may help.     Spoke that he has secondary insomnia which is a common issue in Parkinson  He has a nap and helps  Wondering if there is a prn medication - no clear remedy that I had to offer  Consider gabapentin at bedtime that may help with secondary insomnia but really no clear answers  He would be interested in talking with pharmacy about options.   Told him to get out of bed and read, etc.       Pharmacy (MTM) consultation and medication management  Please call the scheduling number @ [phone] to set up an appointment with pharmacists Suzan Lua, Luz Beard, or Amada Winn.      His keppra is 500mg and 750mg   Will be seeing Dr. Buck   No issues  Controlled well    Omeprazole 20mg  prilosec  Polyethylene glycol miralax   Senna senokot 8.6mg gummies - gummies - fairly often     Carbidopa/levodopa Sinemet CR 50/200  - 1 tab at night.   Carbidopa/levodopa Sinemet 25/100 - 1.5 tab 3/day   No clear wearing off  No falls  [place]es [place] [employer] - [employer] no longer there.  May see a band.   Has some problems playing [employer]  Tries to do daily stretches and aerobics  Has not done big and loud therapy  Has stationary bike at home.   No change in parkinson medication  Return back in 6 months  Last seen was August 2024      Future Appointments 2/3/2025 - 8/2/2025        Date Visit Type Length Department Provider     2/21/2025  1:30 PM RETURN NEUROLOGY 30 min [facility] Aidee Buck MD

## 2025-02-04 ENCOUNTER — TELEPHONE (OUTPATIENT)
Dept: NEUROLOGY | Facility: CLINIC | Age: 65
End: 2025-02-04
Payer: COMMERCIAL

## 2025-02-04 NOTE — TELEPHONE ENCOUNTER
Writer mailed spring symposium information to patient at patient request.  HANH Kasper., MARCO ANTONIO ([facility])

## 2025-02-19 ENCOUNTER — VIRTUAL VISIT (OUTPATIENT)
Dept: PHARMACY | Facility: CLINIC | Age: 65
End: 2025-02-19
Attending: PSYCHIATRY & NEUROLOGY
Payer: COMMERCIAL

## 2025-02-19 DIAGNOSIS — K59.00 CONSTIPATION: ICD-10-CM

## 2025-02-19 DIAGNOSIS — G40.909 SEIZURE DISORDER (H): ICD-10-CM

## 2025-02-19 DIAGNOSIS — G47.00 INSOMNIA: ICD-10-CM

## 2025-02-19 DIAGNOSIS — G20.A1 PARKINSON'S DISEASE WITHOUT DYSKINESIA OR FLUCTUATING MANIFESTATIONS (H): Primary | ICD-10-CM

## 2025-02-19 RX ORDER — DOXEPIN HYDROCHLORIDE 10 MG/1
10 CAPSULE ORAL
COMMUNITY
End: 2025-02-19

## 2025-02-19 NOTE — PATIENT INSTRUCTIONS
"Recommendations from today's MTM visit:                                                    Medication list updated and reviewed   We discussed that the doxepin makes you feel hung over in the morning and fuzzy. I would therefore not recommend continuing to take this. We talked about trying melatonin 3-5mg at bedtime if you wake up to use the bathroom and are unable to fall asleep    Sleep Hygiene Advice    1) Routines  -Good to bed and get up the same time every day  -Avoid daytime naps  -Do something relaxing before bed (i.e. Listen to music, warm bath)  2)Sleep Environment  -Make your bedroom conducive to sleep (i.e. Cool, dark, quiet and comfortable)  -Reserve your bedroom for sleep and sex (No TV, video games, laptop)  3)Diet and Exercise  -Avoid stimulants such as caffeine, sugar, alcohol or smoking before bed  -Exercise every day, but not within 4-6 hours of bedtime  4)If you are unable to fall asleep within 15-20 minutes, get up and leave the bed, do something quietly.      Follow-up: as needed with neuro MTM   Appointments in Next Year      Feb 21, 2025 1:30 PM  (Arrive by 1:15 PM)  Return Neurology with Aidee Buck MD  [facility] ([facility]) [phone]     Aug 11, 2025 11:00 AM  (Arrive by 10:45 AM)  Return Movement Disorder with Sreedhar Kathleen MD  [facility] ([facility]) [phone]            It was great speaking with you today.  I value your experience and would be very thankful for your time in providing feedback in our clinic survey. In the next few days, you may receive an email or text message from [facility] [employer] with a link to a survey related to your  clinical pharmacist.\"     To schedule another MTM appointment, please call the clinic directly or you may call the MTM scheduling line at [phone].    My Clinical Pharmacist's contact information:           "                                            Please feel free to contact me with any questions or concerns you have.      Amada Winn, Pharm.D., MPH  Medication Therapy Management Pharmacist   [facility]

## 2025-02-19 NOTE — PROGRESS NOTES
Medication Therapy Management (MTM) Encounter    ASSESSMENT:                            Medication Adherence/Access: No issues identified.    Parkinson's Disease:  Stable, no changes recommended.     Insomnia:   Provided sleep hygiene (non-pharmacologic) ways to help fall back to sleep. Given the doxepin had residual morning effects, recommend he not take this anymore. Since he is waking up at night to use the restroom and does not have trouble falling asleep at bedtime, would not recommend a medication to help him stay asleep but rather, he may benefit from using melatonin as needed in the middle of the night if he is unable to fall back asleep. Discussed the dose can be adjusted to help prevent morning grogginess.  All patient questions answered.     Seizures:   Controlled. Managed by Dr. Cruz.     Constipation:   Stable.     PLAN:                            Medication list updated and reviewed   We discussed that the doxepin makes you feel hung over in the morning and fuzzy. I would therefore not recommend continuing to take this. We talked about trying melatonin 3-5mg at bedtime if you wake up to use the bathroom and are unable to fall asleep    Sleep Hygiene Advice    1) Routines  -Good to bed and get up the same time every day  -Avoid daytime naps  -Do something relaxing before bed (i.e. Listen to music, warm bath)  2)Sleep Environment  -Make your bedroom conducive to sleep (i.e. Cool, dark, quiet and comfortable)  -Reserve your bedroom for sleep and sex (No TV, video games, laptop)  3)Diet and Exercise  -Avoid stimulants such as caffeine, sugar, alcohol or smoking before bed  -Exercise every day, but not within 4-6 hours of bedtime  4)If you are unable to fall asleep within 15-20 minutes, get up and leave the bed, do something quietly.      Follow-up: as needed with neuro MTM   Appointments in Next Year      Feb 21, 2025 1:30 PM  (Arrive by 1:15 PM)  Return Neurology with Aidee Buck MD  [facility]  [facility] ([facility]) [phone]     Aug 11, 2025 11:00 AM  (Arrive by 10:45 AM)  Return Movement Disorder with Sreedhar Kathleen MD  [facility] ([facility]) [phone]            SUBJECTIVE/OBJECTIVE:                          Marcus Daley is a 64 year old male seen for an initial visit. He was referred to me from Sreedhar Kathleen.      Reason for visit: Initial MTM - discuss insomnia treatment options.    Allergies/ADRs: Reviewed in chart  Past Medical History: Reviewed in chart  Tobacco: He reports that he has been smoking cigars. He has never used smokeless tobacco.Nicotine/Tobacco Cessation Plan  Information offered: Patient not interested at this time  Alcohol:8-10 beverages / week  Specialty Providers: Neurologist: Dr. Kathleen & Dr. Cruz    Medication Adherence/Access: no issues reported.    Parkinson's Disease:  Medication AM Afternoon Evening Bedtime   Carbidopa/levodopa 25/100mg 1.5 tablet 1.5 tablets 1.5 tablets    Carbidopa/levodopa CR 50/200mg    1/2-1 tablet     No concerns reported with taking carbidopa/levodopa. Does have some constipation. He does not notice a big difference if he takes only 1/2 vs 1 tablet of carbidopa/levodopa CR at bedtime.     Insomnia:   His biggest issue is falling back asleep after he gets up in the middle of the night to use the bathroom. Some nights, he is not able to fall back asleep for 3-4 hours. Tried doxepin 10mg at bedtime for a few months but is not just using this as needed. This is helpful with having him sleep through the night but makes him feel hungover in the morning.     Seizures:   - Keppra 500mg in morning and 750mg (1.5x 500mg) at night   Managed by Dr. Cruz. No issues. History of 2 seizures (6/2019 and 11/20219)     Constipation:   - Senna gummies every day as needed   Finds this to be helpful. Was taking Miralax in the past but  has now switched.     Today's Vitals: There were no vitals taken for this visit.  ----------------    I spent 18 minutes with this patient today. All changes were made via collaborative practice agreement with Sreedhar Kathleen.     A summary of these recommendations was sent via [employer].    Amada Winn, Pharm.D., MPH  Medication Therapy Management Pharmacist   [facility]    Telemedicine Visit Details  The patient's medications can be safely assessed via a telemedicine encounter.  Type of service:  Telephone visit  Originating Location (pt. Location): Home    Distant Location (provider location):  Off-site  Start Time: 1:32 PM  End Time: 1:50 PM     Medication Therapy Recommendations  Insomnia   1 Rationale: Synergistic therapy - Needs additional medication therapy - Indication   Recommendation: Start Medication - MELATONIN PO   Status: Accepted - no CPA Needed   Identified Date: 2/19/2025 Completed Date: 2/19/2025

## 2025-02-21 ENCOUNTER — VIRTUAL VISIT (OUTPATIENT)
Dept: NEUROLOGY | Facility: CLINIC | Age: 65
End: 2025-02-21
Payer: COMMERCIAL

## 2025-02-21 DIAGNOSIS — G40.909 SEIZURE DISORDER (H): ICD-10-CM

## 2025-02-21 DIAGNOSIS — G47.09 OTHER INSOMNIA: ICD-10-CM

## 2025-02-21 RX ORDER — LEVETIRACETAM 500 MG/1
TABLET ORAL
Qty: 235 TABLET | Refills: 3 | Status: SHIPPED | OUTPATIENT
Start: 2025-02-21

## 2025-02-21 RX ORDER — DOXEPIN HYDROCHLORIDE 10 MG/1
10 CAPSULE ORAL
Qty: 10 CAPSULE | Refills: 11 | Status: SHIPPED | OUTPATIENT
Start: 2025-02-21

## 2025-02-21 NOTE — PROGRESS NOTES
Marcus is a 64 year old who is being evaluated via a billable video visit.    How would you like to obtain your AVS? MyChart  If the video visit is dropped, the invitation should be resent by: Text to cell phone: [phone]  Will anyone else be joining your video visit? No    Video-Visit Details    Type of service:  Video Visit   Originating Location (pt. Location): Home    Distant Location (provider location):  On-site  Platform used for Video Visit: [facility]      NEUROLOGY PROGRESS NOTE    [facility]     Patient:Marcus Daley  : 1960  Age: 64 year old  Today's virtual visit: 2025    History of present illness:     Mr. Marcus Daley is participating in this virtual visit for follow-up on his epilepsy.  In the previous visit, Mr. Daley was inquiring about coming off the medication and he was instructed to decrease his levetiracetam by 500 mg daily.  He decreased it by 250 mg.  He is doing fine and wants to continue the medication for now.      Levetiracetam level 2024: 16.7    Other medical problems: His Parkinson symptoms are stable.  He did not have a fall.  No ER visit or hospitalization.  He has difficulty maintaining sleep during the night and wakes up frequently and cannot go back to sleep.    Current Outpatient Medications   Medication Sig Dispense Refill    calcium carbonate (TUMS) 500 MG chewable tablet 1/2 to 1 tab as needed      carbidopa-levodopa (SINEMET CR)  MG CR tablet Take 1 tablet by mouth at bedtime. (Patient taking differently: Take 0.5-1 tablets by mouth at bedtime.) 92 tablet 3    carbidopa-levodopa (SINEMET)  MG tablet Take 1.5 tablets by mouth 3 times daily. 450 tablet 3    levETIRAcetam (KEPPRA) 500 MG tablet 500mg in the morning and 1.5 x 500mg at night = 2.5 tabs/day 230 tablet 3    senna (SENOKOT) 8.6 MG tablet Gummies - taking fairly regularly       Exam:    There were no vitals taken for this visit.     Wt Readings from Last 5 Encounters:    03/06/24 93.4 kg (206 lb)   01/19/24 90.7 kg (200 lb)   08/21/23 93.4 kg (206 lb)   02/01/23 93.4 kg (206 lb)   05/09/22 95.3 kg (210 lb)     Alert, awake, NAD, no aphasia or dysarthria, EOMI, face is symmetric, moves upper extremities against gravity.      Assessment/Plan:     1. Epilepsy, nocturnal seizures x2: Seizures are controlled on levetiracetam monotherapy.  Patient denies side effects.  Levetiracetam level was decreased by 250mg/d.  There was no seizure recurrence.  Last seizure was 11/1/2019.  His EEG was normal, his MRI shows white matter ischemic changes.  He changed his mind about discontinuing medication and is going to continue taking it.     2.  Insomnia.  Might be related to his Parkinson, BPH or other causes.  We discussed sleep aid.  He has tried doxepin in the past.  I am going to refill that prescription for him.    3.  Mild Parkinson's disease: Patient's tremors are well-controlled.  He denies difficulty walking, imbalance, falls.  He follows with Dr. Sreedhar Kathleen.         -Continue levetiracetam 500-750  -Obtain levetiracetam level for efficacy.  - Return to clinic in 1 yr        As described above, I met with the patient for 15 minutes and during this time counseling was greater than 50% of the visit time.  I spent an additional 10 minutes on chart review and documentation. This note was created in part by the use of Dragon voice recognition system. Inadvertent grammatical errors and typographical errors may still exist.  Aidee Buck MD

## 2025-02-21 NOTE — LETTER
2025      Marcus Daley  [address] [place] [address]      Dear Colleague,    Thank you for referring your patient, Marcus Daley, to the [facility] [place]. Please see a copy of my visit note below.    Marcus is a 64 year old who is being evaluated via a billable video visit.    How would you like to obtain your AVS? MyChart  If the video visit is dropped, the invitation should be resent by: Text to cell phone: [phone]  Will anyone else be joining your video visit? No  {If patient encounters technical issues they should call [phone] :[id number]}  Video-Visit Details    Type of service:  Video Visit   Originating Location (pt. Location): Home  {PROVIDER LOCATION On-site should be selected for visits conducted from your clinic location or adjoining [facility] hospital, academic office, or other nearby [facility] building. Off-site should be selected for all other provider locations, including home:[id number]}  Distant Location (provider location):  On-site  Platform used for Video Visit: [facility]      NEUROLOGY PROGRESS NOTE   [facility]    Patient:Marcus Daley  : 1960  Age: 64 year old  Today's virtual visit: 2025    History of present illness:     Mr. Marcus Daley is participating in this virtual visit for follow-up on his epilepsy.  In the previous visit, Mr. Daley was inquiring about coming off the medication and he was instructed to decrease his levetiracetam by 500 mg daily.  He decreased it by 250 mg.  He is doing fine and wants to continue the medication for now.      Levetiracetam level 2024: 16.7    Other medical problems: His Parkinson symptoms are stable.  He did not have a fall.  No ER visit or hospitalization.  He has difficulty maintaining sleep during the night and wakes up frequently and cannot go back to sleep.    Current Outpatient Medications   Medication Sig Dispense Refill     calcium carbonate (TUMS) 500 MG chewable  tablet 1/2 to 1 tab as needed       carbidopa-levodopa (SINEMET CR)  MG CR tablet Take 1 tablet by mouth at bedtime. (Patient taking differently: Take 0.5-1 tablets by mouth at bedtime.) 92 tablet 3     carbidopa-levodopa (SINEMET)  MG tablet Take 1.5 tablets by mouth 3 times daily. 450 tablet 3     levETIRAcetam (KEPPRA) 500 MG tablet 500mg in the morning and 1.5 x 500mg at night = 2.5 tabs/day 230 tablet 3     senna (SENOKOT) 8.6 MG tablet Gummies - taking fairly regularly       Exam:    There were no vitals taken for this visit.     Wt Readings from Last 5 Encounters:   03/06/24 93.4 kg (206 lb)   01/19/24 90.7 kg (200 lb)   08/21/23 93.4 kg (206 lb)   02/01/23 93.4 kg (206 lb)   05/09/22 95.3 kg (210 lb)     Alert, awake, NAD, no aphasia or dysarthria, EOMI, face is symmetric, moves upper extremities against gravity.      Assessment/Plan:     1. Epilepsy, nocturnal seizures x2: Seizures are controlled on levetiracetam monotherapy.  Patient denies side effects.  Levetiracetam level was decreased by 250mg/d.  There was no seizure recurrence.  Last seizure was 11/1/2019.  His EEG was normal, his MRI shows white matter ischemic changes.  He changed his mind about discontinuing medication and is going to continue taking it.     2.  Insomnia.  Might be related to his Parkinson, BPH or other causes.  We discussed sleep aid.  He has tried doxepin in the past.  I am going to refill that prescription for him.    3.  Mild Parkinson's disease: Patient's tremors are well-controlled.  He denies difficulty walking, imbalance, falls.  He follows with Dr. Sreedhar Kathleen.         -Continue levetiracetam 500-750  -Obtain levetiracetam level for efficacy.  - Return to clinic in 1 yr        As described above, I met with the patient for 15 minutes and during this time counseling was greater than 50% of the visit time.  I spent an additional 10 minutes on chart review and documentation. This note was created in part by the  use of Dragon voice recognition system. Inadvertent grammatical errors and typographical errors may still exist.  Aidee Buck MD      Again, thank you for allowing me to participate in the care of your patient.        Sincerely,        Aidee Buck MD    Electronically signed

## 2025-02-27 ENCOUNTER — ANCILLARY PROCEDURE (OUTPATIENT)
Dept: GENERAL RADIOLOGY | Facility: CLINIC | Age: 65
End: 2025-02-27
Attending: PHYSICIAN ASSISTANT
Payer: COMMERCIAL

## 2025-02-27 ENCOUNTER — OFFICE VISIT (OUTPATIENT)
Dept: FAMILY MEDICINE | Facility: CLINIC | Age: 65
End: 2025-02-27
Payer: COMMERCIAL

## 2025-02-27 VITALS
BODY MASS INDEX: 27.77 KG/M2 | HEART RATE: 70 BPM | SYSTOLIC BLOOD PRESSURE: 129 MMHG | OXYGEN SATURATION: 98 % | DIASTOLIC BLOOD PRESSURE: 81 MMHG | TEMPERATURE: 98.4 F | HEIGHT: 72 IN | RESPIRATION RATE: 16 BRPM | WEIGHT: 205 LBS

## 2025-02-27 DIAGNOSIS — M79.672 LEFT FOOT PAIN: Primary | ICD-10-CM

## 2025-02-27 DIAGNOSIS — M79.672 LEFT FOOT PAIN: ICD-10-CM

## 2025-02-27 ASSESSMENT — PAIN SCALES - GENERAL: PAINLEVEL_OUTOF10: MILD PAIN (3)

## 2025-02-27 NOTE — PROGRESS NOTES
Assessment & Plan     Left foot pain  Mainly affecting great toe.  Has hallux valgus deformity on exam, suspect this is the cause of much of his pain.  We discussed potential for underlying arthritic changes as well.  Consider wide shoes, activity as tolerated, utilizing Advil as he is doing.  Discussed podiatry follow-up.  X-ray done today and deformity seen as well as narrowing, will await final radiology read, patient interested in podiatry only dependent on severity.  - XR Foot Left G/E 3 Views; Future          BMI  Estimated body mass index is 27.8 kg/m  as calculated from the following:    Height as of this encounter: 1.829 m (6').    Weight as of this encounter: 93 kg (205 lb).             Subjective   Marcus is a 64 year old, presenting for the following health issues:  Musculoskeletal Problem (Left big toe aching for several days)      2/27/2025     2:08 PM   Additional Questions   Roomed by sadie   Accompanied by self         2/27/2025     2:08 PM   Patient Reported Additional Medications   Patient reports taking the following new medications no     Musculoskeletal Problem    History of Present Illness       Reason for visit:  Toe pain  Symptom onset:  1-2 weeks ago  Symptoms include:  Dull ache  Symptom intensity:  Moderate  Symptom progression:  Worsening  Had these symptoms before:  Yes  Has tried/received treatment for these symptoms:  No  What makes it worse:  Walking  What makes it better:  Advil   He is taking medications regularly.            For some time patient has had intermittent pain to his left great toe.  Feels it over the MTP joint as well as distally to the toe.  Had an injury to his distal toe 45 years ago, sustained a laceration by a lawnmower.  Does not believe he had any effect to the bone, did receive sutures.  Did not cause immediate issue.  Recently has been having intermittent aching pain to the toe.  Seems to last for a few days and improve.  He will take Advil which is helpful.   Current episode going on for about a week.              Objective    BP (!) 145/85 (BP Location: Left arm, Patient Position: Sitting, Cuff Size: Adult Large)   Pulse 70   Temp 98.4  F (36.9  C) (Temporal)   Resp 16   Ht 1.829 m (6')   Wt 93 kg (205 lb)   SpO2 98%   BMI 27.80 kg/m    Body mass index is 27.8 kg/m .  Physical Exam   GENERAL: alert and no distress  Extremities: Left foot with hallux valgus deformity.  Some stiffness to range of motion of great toe.  No tenderness on exam.  No edema or changes in the skin.  Right foot used as comparison and unremarkable. Cap refill less than 2 seconds.            Signed Electronically by: Hyun Johnson PA-C

## 2025-02-27 NOTE — PATIENT INSTRUCTIONS
At [facility], we strive to deliver an exceptional experience to you, every time we see you. If you receive a survey, please let us know what we are doing well and/or what we could improve upon, as we do value your feedback.  If you have MyChart, you can expect to receive results automatically within 24 hours of their completion.  Your provider will send a note interpreting your results as well.   If you do not have MyChart, you should receive your results in about a week by mail.    Your care team:                            Family Medicine Internal Medicine   MD Oseas Castellano, MD Lara Hart, MD Roscoe Rodríguez, MD Sandy Morales, PAJanieC    Ovi Pires, MD Pediatrics   Sondra Diez, MD Bianca Ybarra, MD Amanda Mckee, APRN CNP Kimberly Bartlett APRN CNP   MD Jessica Henriquez, MD Marzena Reddy, CNP     Alexis Neville, CNP Same-Day Provider (No follow-up visits)   ADRIAN Cortes, DNP Cyndee Landon, ADRIAN Green, FNP, BC PILY BrunnerC     Clinic hours: Monday - Thursday 7 am-6 pm; Fridays 7 am-5 pm.   Urgent care: Monday - Friday 10 am- 8 pm; Saturday and Sunday 9 am-5 pm.    Clinic: [phone]       [facility]: Monday - Thursday 8 am - 7 pm; Friday 8 am - 6 pm  [facility]: [phone]

## 2025-03-03 ENCOUNTER — PATIENT OUTREACH (OUTPATIENT)
Dept: CARE COORDINATION | Facility: CLINIC | Age: 65
End: 2025-03-03
Payer: COMMERCIAL

## 2025-03-05 ENCOUNTER — PATIENT OUTREACH (OUTPATIENT)
Dept: CARE COORDINATION | Facility: CLINIC | Age: 65
End: 2025-03-05
Payer: COMMERCIAL

## 2025-04-19 ENCOUNTER — HEALTH MAINTENANCE LETTER (OUTPATIENT)
Age: 65
End: 2025-04-19

## 2025-05-12 ENCOUNTER — PATIENT OUTREACH (OUTPATIENT)
Dept: CARE COORDINATION | Facility: CLINIC | Age: 65
End: 2025-05-12
Payer: COMMERCIAL

## 2025-08-11 ENCOUNTER — VIRTUAL VISIT (OUTPATIENT)
Dept: NEUROLOGY | Facility: CLINIC | Age: 65
End: 2025-08-11
Payer: COMMERCIAL

## 2025-08-11 DIAGNOSIS — G20.A1 PARKINSON'S DISEASE, UNSPECIFIED WHETHER DYSKINESIA PRESENT, UNSPECIFIED WHETHER MANIFESTATIONS FLUCTUATE (H): Primary | ICD-10-CM

## 2025-08-11 DIAGNOSIS — R94.31 ABNORMAL ELECTROCARDIOGRAM: ICD-10-CM

## 2025-08-11 DIAGNOSIS — G47.09 OTHER INSOMNIA: ICD-10-CM

## 2025-08-11 PROCEDURE — G2211 COMPLEX E/M VISIT ADD ON: HCPCS | Performed by: PSYCHIATRY & NEUROLOGY

## 2025-08-11 PROCEDURE — 98006 SYNCH AUDIO-VIDEO EST MOD 30: CPT | Mod: 25 | Performed by: PSYCHIATRY & NEUROLOGY

## 2025-08-11 PROCEDURE — 93000 ELECTROCARDIOGRAM COMPLETE: CPT | Performed by: PSYCHIATRY & NEUROLOGY

## 2025-08-11 RX ORDER — CARBIDOPA AND LEVODOPA 25; 100 MG/1; MG/1
1.5 TABLET ORAL 3 TIMES DAILY
Qty: 450 TABLET | Refills: 3 | Status: SHIPPED | OUTPATIENT
Start: 2025-08-11

## 2025-08-11 RX ORDER — DOXEPIN HYDROCHLORIDE 10 MG/1
10 CAPSULE ORAL
Qty: 10 CAPSULE | Refills: 11 | Status: SHIPPED | OUTPATIENT
Start: 2025-08-11

## 2025-08-11 RX ORDER — CARBIDOPA AND LEVODOPA 50; 200 MG/1; MG/1
1 TABLET, EXTENDED RELEASE ORAL AT BEDTIME
Qty: 92 TABLET | Refills: 3 | Status: SHIPPED | OUTPATIENT
Start: 2025-08-11

## 2025-08-12 ENCOUNTER — PATIENT OUTREACH (OUTPATIENT)
Dept: CARE COORDINATION | Facility: CLINIC | Age: 65
End: 2025-08-12
Payer: COMMERCIAL

## 2025-08-12 ENCOUNTER — TELEPHONE (OUTPATIENT)
Dept: NEUROLOGY | Facility: CLINIC | Age: 65
End: 2025-08-12
Payer: COMMERCIAL

## 2025-08-14 ENCOUNTER — PATIENT OUTREACH (OUTPATIENT)
Dept: CARE COORDINATION | Facility: CLINIC | Age: 65
End: 2025-08-14
Payer: COMMERCIAL

## 2025-08-18 DIAGNOSIS — G20.A1 PARKINSON'S DISEASE, UNSPECIFIED WHETHER DYSKINESIA PRESENT, UNSPECIFIED WHETHER MANIFESTATIONS FLUCTUATE (H): Primary | ICD-10-CM

## 2025-08-18 DIAGNOSIS — R94.31 ABNORMAL ELECTROCARDIOGRAM: ICD-10-CM

## (undated) DEVICE — PAD CHUX UNDERPAD 23X24" 7136

## (undated) DEVICE — KIT ENDO FIRST STEP DISINFECTANT 200ML W/POUCH EP-4

## (undated) RX ORDER — FENTANYL CITRATE 50 UG/ML
INJECTION, SOLUTION INTRAMUSCULAR; INTRAVENOUS
Status: DISPENSED
Start: 2023-04-13